# Patient Record
Sex: MALE | Race: BLACK OR AFRICAN AMERICAN | NOT HISPANIC OR LATINO | Employment: FULL TIME | URBAN - METROPOLITAN AREA
[De-identification: names, ages, dates, MRNs, and addresses within clinical notes are randomized per-mention and may not be internally consistent; named-entity substitution may affect disease eponyms.]

---

## 2017-04-24 ENCOUNTER — GENERIC CONVERSION - ENCOUNTER (OUTPATIENT)
Dept: OTHER | Facility: OTHER | Age: 32
End: 2017-04-24

## 2017-06-02 DIAGNOSIS — I63.30 CEREBRAL INFARCTION DUE TO THROMBOSIS OF CEREBRAL ARTERY (HCC): ICD-10-CM

## 2017-09-05 DIAGNOSIS — I63.30 CEREBRAL INFARCTION DUE TO THROMBOSIS OF CEREBRAL ARTERY (HCC): ICD-10-CM

## 2017-12-07 ENCOUNTER — GENERIC CONVERSION - ENCOUNTER (OUTPATIENT)
Dept: OTHER | Facility: OTHER | Age: 32
End: 2017-12-07

## 2018-01-10 NOTE — MISCELLANEOUS
Message  Return to work or school:   Geni Hernandez is under my professional care  He was seen in my office on 8/30/16    He is not able to return to work until after he is seen             Signatures   Electronically signed by : Ryanne Morales DO; Aug 29 2016  5:21PM EST                       (Author)

## 2018-01-11 NOTE — MISCELLANEOUS
Message  Message Free Text Note Form: Patient had done INR level on Friday night  It was 1 8  I increased Coumadin dose to 7 5 mg T/W/Th/Sa/Su and 5 mg M/F and he is to do repeat INR on Thursday of this week  Karissa Jones DO       Signatures   Electronically signed by : Kvng Westfall DO;  Apr 24 2017  7:58PM EST                       (Author)

## 2018-01-11 NOTE — RESULT NOTES
Verified Results  (1) PT WITH INR 09Apr2016 09:56AM Lucretia Jones     Test Name Result Flag Reference   INR 2 3 H    Reference Range                     0 9-1 1  Moderate-intensity Warfarin Therapy 2 0-3 0  Higher-intensity Warfarin Therapy   3 0-4 0   PT 22 9 sec H 9 0-11 5   For more information on this test, go to:  [email or web address]

## 2018-01-12 NOTE — RESULT NOTES
Verified Results  MAMMO DIAGNOSTIC BILATERAL W CAD 22Apr2016 12:52PM Gin Munoz   TW Order Number: [id number]     Test Name Result Flag Reference   MAMMO DIAGNOSTIC BILATERAL W CAD (Report)     Patient History:   Family history of unknown cancer in father at age 79  Reason for exam: clinical finding  Mammo Diagnostic Bilateral W CAD: April 22, 2016 - Check In #:    [de-identified]   Bilateral MLO and CC view(s) were taken  Technologist: ARELY Duran (R)(M)   No prior studies available for comparison  The breast tissue is almost entirely fat  These images were    obtained using digital technique and with the assistance of    Computer Aided Detection  The breast tissue is predominantly    male pattern  There are no dominant masses, foci of architectural   distortion or suspicious clusters of calcification to suggest    malignancy  The visualized skin and nipples appear normal  A    small amount of symmetric retroareolar tissue is present    consistent with mild asymmetric gynecomastia  Medications that could potentially have gynecomastia as a side    effect are at least partially listed at the following website:    [email or web address]  [email or web address] htm     ASSESSMENT: BiRad:2 - Benign     Recommendation:   Clinical management and follow-up diagnostic mammogram of both    breasts if clinically indicated  Analyzed by CAD     8-10% of cancers will be missed on mammography  Management of a    palpable abnormality must be based on clinical grounds  Patients   will be notified of their results via letter from our facility  Accredited by [employer] and FDA       Transcription Location: [facility] [address]: [id number]     Risk Value(s):   Myriad Table: 1 5%

## 2018-01-13 NOTE — RESULT NOTES
Verified Results  (1) PT WITH INR [id number] 10:36AM Bart Jones     Test Name Result Flag Reference   INR 2 6 H    Reference Range                     0 9-1 1  Moderate-intensity Warfarin Therapy 2 0-3 0  Higher-intensity Warfarin Therapy   3 0-4 0   PT 26 0 sec H 9 0-11 5   For more information on this test, go to:  [email or web address]

## 2018-01-14 NOTE — RESULT NOTES
Verified Results  (1) LIPID PANEL, FASTING 09Apr2016 09:59AM Lucretia Jones     Test Name Result Flag Reference   CHOLESTEROL, TOTAL 92 mg/dL L 125-200   HDL CHOLESTEROL 30 mg/dL L > OR = 40   TRIGLICERIDES 682 mg/dL  <150   LDL-CHOLESTEROL 40 mg/dL (calc)  <130   Desirable range <100 mg/dL for patients with CHD or  diabetes and <70 mg/dL for diabetic patients with  known heart disease  CHOL/HDLC RATIO 3 1 (calc)  < OR = 5 0   NON HDL CHOLESTEROL 62 mg/dL (calc)     Target for non-HDL cholesterol is 30 mg/dL higher than   LDL cholesterol target

## 2018-01-15 NOTE — RESULT NOTES
Verified Results  * XR SPINE LUMBAR MINIMUM 4 VIEWS NON INJURY [id number] 02:05PM Latasha Gallegos Order Number: [id number]     Test Name Result Flag Reference   XR SPINE LUMBAR MINIMUM 4 VIEWS (Report)     LUMBAR SPINE     INDICATION: low back pain, numbness/tingling/ pain in the legs x a few months     COMPARISON: Lumbar spine radiographs 1/10/2009  VIEWS: AP, lateral, bilateral oblique and coned down projections; 5 images     FINDINGS:     Alignment is unremarkable  There is no radiographic evidence of acute fracture or destructive osseous lesion  Mild L4-5 and L5-S1 degenerative changes stable since the prior study  Incidental noted is made of spina bifida occulta at L5  Visualized soft tissues appear unremarkable  IMPRESSION:     Mild stable L4-5 and L5-S1 degenerative changes         Workstation performed: [id number]     Signed by:   Tariq Moseley MD   11/21/16

## 2018-01-16 NOTE — RESULT NOTES
Verified Results  (1) PT WITH INR [id number] 01:24PM Raj Jones     Test Name Result Flag Reference   INR 3 1 H    Reference Range                     0 9-1 1  Moderate-intensity Warfarin Therapy 2 0-3 0  Higher-intensity Warfarin Therapy   3 0-4 0   PT 31 6 sec H 9 0-11 5   For more information on this test, go to:  [email or web address]

## 2018-01-18 ENCOUNTER — GENERIC CONVERSION - ENCOUNTER (OUTPATIENT)
Dept: OTHER | Facility: OTHER | Age: 33
End: 2018-01-18

## 2018-01-18 LAB — INR PPP: 1.9 (ref 0.86–1.16)

## 2018-01-23 ENCOUNTER — ANTICOAG VISIT (OUTPATIENT)
Dept: FAMILY MEDICINE CLINIC | Facility: HOSPITAL | Age: 33
End: 2018-01-23

## 2018-02-01 DIAGNOSIS — I10 ESSENTIAL HYPERTENSION: Primary | ICD-10-CM

## 2018-02-01 RX ORDER — AMLODIPINE BESYLATE 10 MG/1
TABLET ORAL
Qty: 90 TABLET | Refills: 3 | Status: SHIPPED | OUTPATIENT
Start: 2018-02-01 | End: 2019-02-12 | Stop reason: SDUPTHER

## 2018-02-05 LAB — INR PPP: 2.3 (ref 0.86–1.16)

## 2018-02-07 ENCOUNTER — TELEPHONE (OUTPATIENT)
Dept: FAMILY MEDICINE CLINIC | Facility: HOSPITAL | Age: 33
End: 2018-02-07

## 2018-02-07 ENCOUNTER — ANTICOAG VISIT (OUTPATIENT)
Dept: FAMILY MEDICINE CLINIC | Facility: HOSPITAL | Age: 33
End: 2018-02-07

## 2018-02-07 NOTE — TELEPHONE ENCOUNTER
Called Jeremie Thomas and gave her the INR instructions same does, recheck in 2 weeks she is aware DD

## 2018-02-19 ENCOUNTER — OFFICE VISIT (OUTPATIENT)
Dept: FAMILY MEDICINE CLINIC | Facility: HOSPITAL | Age: 33
End: 2018-02-19
Payer: COMMERCIAL

## 2018-02-19 ENCOUNTER — TELEPHONE (OUTPATIENT)
Dept: FAMILY MEDICINE CLINIC | Facility: HOSPITAL | Age: 33
End: 2018-02-19

## 2018-02-19 VITALS
HEIGHT: 69 IN | OXYGEN SATURATION: 98 % | DIASTOLIC BLOOD PRESSURE: 58 MMHG | HEART RATE: 96 BPM | SYSTOLIC BLOOD PRESSURE: 138 MMHG | BODY MASS INDEX: 46.65 KG/M2 | WEIGHT: 315 LBS

## 2018-02-19 DIAGNOSIS — R50.9 FEBRILE ILLNESS: Primary | ICD-10-CM

## 2018-02-19 PROCEDURE — 99213 OFFICE O/P EST LOW 20 MIN: CPT | Performed by: INTERNAL MEDICINE

## 2018-02-19 RX ORDER — ATORVASTATIN CALCIUM 40 MG/1
1 TABLET, FILM COATED ORAL DAILY
COMMUNITY
Start: 2016-04-07 | End: 2018-03-17 | Stop reason: SDUPTHER

## 2018-02-19 RX ORDER — FLUTICASONE PROPIONATE 50 MCG
1 SPRAY, SUSPENSION (ML) NASAL DAILY
Qty: 16 G | Refills: 0 | Status: SHIPPED | OUTPATIENT
Start: 2018-02-19 | End: 2021-10-13 | Stop reason: ALTCHOICE

## 2018-02-19 RX ORDER — LISINOPRIL AND HYDROCHLOROTHIAZIDE 20; 12.5 MG/1; MG/1
1 TABLET ORAL DAILY
COMMUNITY
Start: 2017-08-25 | End: 2018-12-06 | Stop reason: SDUPTHER

## 2018-02-19 RX ORDER — SPIRONOLACTONE 25 MG/1
TABLET ORAL
COMMUNITY
Start: 2018-01-24 | End: 2018-04-20 | Stop reason: SDUPTHER

## 2018-02-19 RX ORDER — SULFAMETHOXAZOLE AND TRIMETHOPRIM 800; 160 MG/1; MG/1
1 TABLET ORAL EVERY 12 HOURS SCHEDULED
Qty: 14 TABLET | Refills: 0 | Status: SHIPPED | OUTPATIENT
Start: 2018-02-19 | End: 2018-02-26

## 2018-02-19 RX ORDER — WARFARIN SODIUM 5 MG/1
TABLET ORAL
COMMUNITY
Start: 2018-02-19 | End: 2018-03-17 | Stop reason: SDUPTHER

## 2018-02-19 RX ORDER — WARFARIN SODIUM 2.5 MG/1
TABLET ORAL
COMMUNITY
Start: 2017-02-01 | End: 2018-12-20 | Stop reason: SDUPTHER

## 2018-02-19 NOTE — PROGRESS NOTES
Assessment/Plan:      Diagnoses and all orders for this visit:    Febrile illness    Other orders  -     atorvastatin (LIPITOR) 40 mg tablet; Take 1 tablet by mouth daily  -     lisinopril-hydrochlorothiazide (PRINZIDE,ZESTORETIC) 20-12 5 MG per tablet; Take 1 tablet by mouth daily  -     Prenat w/o L-HJ-Wfpqyuy-FA-DHA (PRENATE DHA) 28-0 6-0 4-300 MG CAPS;   -     spironolactone (ALDACTONE) 25 mg tablet;   -     warfarin (COUMADIN) 2 5 mg tablet; Take by mouth  -     warfarin (COUMADIN) 5 mg tablet;           Subjective:     Patient ID: Evan Herring is a 28 y o  male  Presents c/o fever, chills, aches, coughing for 24 hrs        Review of Systems   Constitutional: Positive for fatigue and fever  HENT: Negative for hearing loss  Eyes: Negative for visual disturbance  Respiratory: Positive for cough and shortness of breath  Negative for chest tightness and wheezing  Cardiovascular: Negative for chest pain, palpitations and leg swelling  Gastrointestinal: Negative for abdominal pain, diarrhea and nausea  Genitourinary: Negative for dysuria and hematuria  Musculoskeletal: Negative for arthralgias  Neurological: Negative for dizziness, numbness and headaches  Psychiatric/Behavioral: Negative for confusion and dysphoric mood  All other systems reviewed and are negative  Objective:     Physical Exam   Constitutional: He is oriented to person, place, and time  He appears well-developed and well-nourished  Eyes: Pupils are equal, round, and reactive to light  Neck: Normal range of motion  Neck supple  No thyromegaly present  Cardiovascular: Normal rate, regular rhythm, normal heart sounds and intact distal pulses  No murmur heard  Pulmonary/Chest: Effort normal and breath sounds normal  He has no wheezes  He has no rales  Abdominal: Soft  Bowel sounds are normal  There is no tenderness  Musculoskeletal: Normal range of motion  He exhibits no edema, tenderness or deformity  Lymphadenopathy:     He has no cervical adenopathy  Neurological: He is alert and oriented to person, place, and time  He has normal reflexes  Skin: Skin is warm and dry  Psychiatric: He has a normal mood and affect  Vitals reviewed

## 2018-02-19 NOTE — PATIENT INSTRUCTIONS
Sinusitis or upper respiratory "cold" symptoms usually respond to symptomatic treatment  These conditions can last from 7 to 14 days with the worst symptoms occurring about day 4, 5, and 6  Symptomatic treatment includes tylenol or ibuprofen-like medication , 2 tablets every 4 hours for 48 hours; flonase spray in each nostril twice a day for 7 days: claritin 10 mg daily for 7 days (antihistamine): OTC cough suppressant if needed; rest: lots of fluids  If you have been prescribed antibiotic, take as directed and finish entire course  If high fever >101 occurs, or if symptoms worsen significantly, contact the office

## 2018-02-20 ENCOUNTER — TELEPHONE (OUTPATIENT)
Dept: FAMILY MEDICINE CLINIC | Facility: HOSPITAL | Age: 33
End: 2018-02-20

## 2018-02-20 LAB — INR PPP: 2.3 (ref 0.86–1.16)

## 2018-02-21 ENCOUNTER — TELEPHONE (OUTPATIENT)
Dept: FAMILY MEDICINE CLINIC | Facility: HOSPITAL | Age: 33
End: 2018-02-21

## 2018-02-21 ENCOUNTER — ANTICOAG VISIT (OUTPATIENT)
Dept: FAMILY MEDICINE CLINIC | Facility: HOSPITAL | Age: 33
End: 2018-02-21

## 2018-02-21 NOTE — TELEPHONE ENCOUNTER
I can not find any other message on this from you, please advise when should pt recheck ?  thanks DD

## 2018-02-21 NOTE — TELEPHONE ENCOUNTER
FYI: Pt is getting harassed from [employer]ina[employer] if he doesn't get rechecked Q 2 weeks ( this is a contract with  and [employer] Linares  )pt   wanted to know if you can change this, because he is trying to follow your instructions and as you know they can change  [phone] option 5 customer service number   Thanks DD

## 2018-03-06 ENCOUNTER — TELEPHONE (OUTPATIENT)
Dept: FAMILY MEDICINE CLINIC | Facility: HOSPITAL | Age: 33
End: 2018-03-06

## 2018-03-06 ENCOUNTER — ANTICOAG VISIT (OUTPATIENT)
Dept: FAMILY MEDICINE CLINIC | Facility: HOSPITAL | Age: 33
End: 2018-03-06

## 2018-03-06 LAB — INR PPP: 3 (ref 0.86–1.16)

## 2018-03-12 LAB — INR PPP: 3.4 (ref 0.86–1.16)

## 2018-03-13 ENCOUNTER — ANTICOAG VISIT (OUTPATIENT)
Dept: FAMILY MEDICINE CLINIC | Facility: HOSPITAL | Age: 33
End: 2018-03-13

## 2018-03-13 ENCOUNTER — TELEPHONE (OUTPATIENT)
Dept: FAMILY MEDICINE CLINIC | Facility: HOSPITAL | Age: 33
End: 2018-03-13

## 2018-03-17 DIAGNOSIS — R60.1 GENERALIZED EDEMA: Primary | ICD-10-CM

## 2018-03-17 DIAGNOSIS — I63.9 CEREBROVASCULAR ACCIDENT (CVA), UNSPECIFIED MECHANISM (HCC): Primary | ICD-10-CM

## 2018-03-19 RX ORDER — WARFARIN SODIUM 5 MG/1
TABLET ORAL
Qty: 60 TABLET | Refills: 5 | Status: SHIPPED | OUTPATIENT
Start: 2018-03-19 | End: 2018-12-20 | Stop reason: SDUPTHER

## 2018-03-19 RX ORDER — ATORVASTATIN CALCIUM 40 MG/1
TABLET, FILM COATED ORAL
Qty: 30 TABLET | Refills: 5 | Status: SHIPPED | OUTPATIENT
Start: 2018-03-19 | End: 2018-10-17 | Stop reason: SDUPTHER

## 2018-03-19 RX ORDER — MOMETASONE FUROATE 50 UG/1
SPRAY, METERED NASAL
Qty: 1 ACT | Refills: 2 | Status: SHIPPED | OUTPATIENT
Start: 2018-03-19 | End: 2018-05-24 | Stop reason: SDUPTHER

## 2018-03-20 ENCOUNTER — TELEPHONE (OUTPATIENT)
Dept: FAMILY MEDICINE CLINIC | Facility: HOSPITAL | Age: 33
End: 2018-03-20

## 2018-03-20 ENCOUNTER — ANTICOAG VISIT (OUTPATIENT)
Dept: FAMILY MEDICINE CLINIC | Facility: HOSPITAL | Age: 33
End: 2018-03-20

## 2018-03-20 LAB — INR PPP: 2.2 (ref 0.86–1.16)

## 2018-03-26 LAB — INR PPP: 2.8 (ref 0.86–1.16)

## 2018-03-27 ENCOUNTER — ANTICOAG VISIT (OUTPATIENT)
Dept: FAMILY MEDICINE CLINIC | Facility: HOSPITAL | Age: 33
End: 2018-03-27

## 2018-03-27 ENCOUNTER — TELEPHONE (OUTPATIENT)
Dept: FAMILY MEDICINE CLINIC | Facility: HOSPITAL | Age: 33
End: 2018-03-27

## 2018-03-27 NOTE — PROGRESS NOTES
Left a detailed message for Salvatore Leyva with INR instructions same dose recheck in 1 month as per Brigitte Shown DD

## 2018-04-09 LAB — INR PPP: 2.5 (ref 0.86–1.16)

## 2018-04-10 ENCOUNTER — TELEPHONE (OUTPATIENT)
Dept: FAMILY MEDICINE CLINIC | Facility: HOSPITAL | Age: 33
End: 2018-04-10

## 2018-04-10 ENCOUNTER — ANTICOAG VISIT (OUTPATIENT)
Dept: FAMILY MEDICINE CLINIC | Facility: HOSPITAL | Age: 33
End: 2018-04-10

## 2018-04-10 NOTE — TELEPHONE ENCOUNTER
Stuart Sims called with PT/INR results  Last night it was 2 5    Wero Julianmoreno is taking 10mg m,f, 7 5  Rest

## 2018-04-16 LAB — INR PPP: 2.9 (ref 0.86–1.16)

## 2018-04-17 ENCOUNTER — TELEPHONE (OUTPATIENT)
Dept: FAMILY MEDICINE CLINIC | Facility: HOSPITAL | Age: 33
End: 2018-04-17

## 2018-04-17 NOTE — TELEPHONE ENCOUNTER
Lorrie Castorena stopped into office I gave her info DD      Pt current dose is 10 mg M /Fri,7 5 mg all other days please advise DD

## 2018-04-18 ENCOUNTER — ANTICOAG VISIT (OUTPATIENT)
Dept: FAMILY MEDICINE CLINIC | Facility: HOSPITAL | Age: 33
End: 2018-04-18

## 2018-04-20 DIAGNOSIS — I10 ESSENTIAL HYPERTENSION: Primary | ICD-10-CM

## 2018-04-20 RX ORDER — SPIRONOLACTONE 25 MG/1
TABLET ORAL
Qty: 90 TABLET | Refills: 3 | Status: SHIPPED | OUTPATIENT
Start: 2018-04-20 | End: 2019-05-14 | Stop reason: SDUPTHER

## 2018-04-24 LAB — INR PPP: 2.9 (ref 0.86–1.16)

## 2018-04-25 ENCOUNTER — ANTICOAG VISIT (OUTPATIENT)
Dept: FAMILY MEDICINE CLINIC | Facility: HOSPITAL | Age: 33
End: 2018-04-25

## 2018-04-25 ENCOUNTER — TELEPHONE (OUTPATIENT)
Dept: FAMILY MEDICINE CLINIC | Facility: HOSPITAL | Age: 33
End: 2018-04-25

## 2018-05-03 ENCOUNTER — TELEPHONE (OUTPATIENT)
Dept: FAMILY MEDICINE CLINIC | Facility: HOSPITAL | Age: 33
End: 2018-05-03

## 2018-05-03 DIAGNOSIS — G45.9 TRANSIENT CEREBRAL ISCHEMIA, UNSPECIFIED TYPE: Primary | ICD-10-CM

## 2018-05-09 ENCOUNTER — OFFICE VISIT (OUTPATIENT)
Dept: FAMILY MEDICINE CLINIC | Facility: HOSPITAL | Age: 33
End: 2018-05-09
Payer: COMMERCIAL

## 2018-05-09 VITALS
HEART RATE: 95 BPM | SYSTOLIC BLOOD PRESSURE: 104 MMHG | DIASTOLIC BLOOD PRESSURE: 68 MMHG | HEIGHT: 69 IN | OXYGEN SATURATION: 98 % | BODY MASS INDEX: 46.65 KG/M2 | WEIGHT: 315 LBS

## 2018-05-09 DIAGNOSIS — E66.01 CLASS 3 SEVERE OBESITY DUE TO EXCESS CALORIES WITHOUT SERIOUS COMORBIDITY WITH BODY MASS INDEX (BMI) OF 50.0 TO 59.9 IN ADULT (HCC): ICD-10-CM

## 2018-05-09 DIAGNOSIS — R79.89 LOW TESTOSTERONE: ICD-10-CM

## 2018-05-09 DIAGNOSIS — E78.00 PURE HYPERCHOLESTEROLEMIA: ICD-10-CM

## 2018-05-09 DIAGNOSIS — R73.09 ELEVATED GLUCOSE: ICD-10-CM

## 2018-05-09 DIAGNOSIS — I10 ESSENTIAL HYPERTENSION: ICD-10-CM

## 2018-05-09 DIAGNOSIS — I63.30 CEREBRAL THROMBOSIS WITH CEREBRAL INFARCTION (HCC): Primary | ICD-10-CM

## 2018-05-09 PROCEDURE — 99213 OFFICE O/P EST LOW 20 MIN: CPT | Performed by: INTERNAL MEDICINE

## 2018-05-09 PROCEDURE — 3078F DIAST BP <80 MM HG: CPT | Performed by: INTERNAL MEDICINE

## 2018-05-09 PROCEDURE — 3074F SYST BP LT 130 MM HG: CPT | Performed by: INTERNAL MEDICINE

## 2018-05-09 NOTE — ASSESSMENT & PLAN NOTE
On anticoagulation and is well controlled - has home monitor every 2 weeks which is stable 2 9 and 2 2 on last  Readings(Sinclair machine)      currrent dose is 10 mg m/f and rest 7 5 mg

## 2018-05-09 NOTE — PROGRESS NOTES
Assessment/Plan:         Problem List Items Addressed This Visit        Cardiovascular and Mediastinum    Hypertension     Well controlled bp- to have echo at [facility] today  Has been stable with no headaches or dizziness  Relevant Orders    Lipid Panel with Direct LDL reflex    Comprehensive metabolic panel    TSH, 3rd generation with T4 reflex    Cerebral thrombosis with cerebral infarction ([facility] [address] ) - Primary     On anticoagulation and is well controlled - has home monitor every 2 weeks which is stable 2 9 and 2 2 on last  Readings(Sinclair machine)  currrent dose is 10 mg m/f and rest 7 5 mg           Relevant Orders    Lipid Panel with Direct LDL reflex    CBC and differential    Comprehensive metabolic panel       Other    Low testosterone    Relevant Orders    Testosterone    Hyperlipidemia    Elevated glucose    Relevant Orders    HEMOGLOBIN A1C W/ EAG ESTIMATION            Subjective:      Patient ID: Carolin Martínez is a 28 y o  male    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  Await those results to sign final clearance  The following portions of the patient's history were reviewed and updated as appropriate: allergies, current medications and problem list      Review of Systems   Constitutional: Negative for activity change, chills, fatigue and fever  HENT: Negative for congestion and postnasal drip  Some seasonal allergies   Cardiovascular: Negative for chest pain, palpitations and leg swelling  All other systems reviewed and are negative          Objective:      Current Outpatient Prescriptions:     amLODIPine (NORVASC) 10 mg tablet, TAKE 1 TABLET EVERY DAY, Disp: 90 tablet, Rfl: 3    atorvastatin (LIPITOR) 40 mg tablet, TAKE ONE TABLET BY MOUTH ONE TIME DAILY, Disp: 30 tablet, Rfl: 5    fluticasone (FLONASE) 50 mcg/act nasal spray, 1 spray into each nostril daily, Disp: 16 g, Rfl: 0    lisinopril-hydrochlorothiazide (PRINZIDE,ZESTORETIC) 20-12 5 MG per tablet, Take 1 tablet by mouth daily, Disp: , Rfl:     mometasone (NASONEX) 50 mcg/act nasal spray, USE 1 SPRAY EACH NOSTRIL DAILY, Disp: 1 Act, Rfl: 2    spironolactone (ALDACTONE) 25 mg tablet, TAKE 1 TABLET DAILY  , Disp: 90 tablet, Rfl: 3    warfarin (COUMADIN) 2 5 mg tablet, Take by mouth, Disp: , Rfl:     warfarin (COUMADIN) 5 mg tablet, TAKE 2 TABLETS DAILY AND OR AS DIRECTED, Disp: 60 tablet, Rfl: 5         Physical Exam   Constitutional: He is oriented to person, place, and time  He appears well-developed  obese   HENT:   Head: Normocephalic  Right Ear: External ear normal    Left Ear: External ear normal    Mouth/Throat: Oropharynx is clear and moist    Eyes: Right eye exhibits no discharge  Left eye exhibits no discharge  Neck: No JVD present  Cardiovascular: Normal rate and regular rhythm  Exam reveals no friction rub  No murmur heard  Pulmonary/Chest: Effort normal and breath sounds normal  No respiratory distress  He has no wheezes  Abdominal: Soft  He exhibits no distension  There is no tenderness  Neurological: He is alert and oriented to person, place, and time  He displays normal reflexes  No cranial nerve deficit  Coordination normal    Skin: Skin is warm and dry  No rash noted  Nursing note and vitals reviewed

## 2018-05-09 NOTE — ASSESSMENT & PLAN NOTE
Well controlled bp- to have echo at [facility] today  Has been stable with no headaches or dizziness

## 2018-05-10 ENCOUNTER — TELEPHONE (OUTPATIENT)
Dept: FAMILY MEDICINE CLINIC | Facility: HOSPITAL | Age: 33
End: 2018-05-10

## 2018-05-10 NOTE — TELEPHONE ENCOUNTER
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

## 2018-05-11 NOTE — TELEPHONE ENCOUNTER
INR and echo results faxed  Dr Iraida Reed dictated letter which his mother picked up on Thursday  No CDL paperwork

## 2018-05-16 LAB
ALBUMIN SERPL-MCNC: 4.1 G/DL (ref 3.6–5.1)
ALBUMIN/GLOB SERPL: 1.3 (CALC) (ref 1–2.5)
ALP SERPL-CCNC: 67 U/L (ref 40–115)
ALT SERPL-CCNC: 41 U/L (ref 9–46)
AST SERPL-CCNC: 23 U/L (ref 10–40)
BASOPHILS # BLD AUTO: 24 CELLS/UL (ref 0–200)
BASOPHILS NFR BLD AUTO: 0.2 %
BILIRUB SERPL-MCNC: 0.4 MG/DL (ref 0.2–1.2)
BUN SERPL-MCNC: 17 MG/DL (ref 7–25)
BUN/CREAT SERPL: ABNORMAL (CALC) (ref 6–22)
CALCIUM SERPL-MCNC: 8.8 MG/DL (ref 8.6–10.3)
CHLORIDE SERPL-SCNC: 101 MMOL/L (ref 98–110)
CHOLEST SERPL-MCNC: 118 MG/DL
CHOLEST/HDLC SERPL: 3.6 (CALC)
CO2 SERPL-SCNC: 26 MMOL/L (ref 20–31)
CREAT SERPL-MCNC: 1.1 MG/DL (ref 0.6–1.35)
EOSINOPHIL # BLD AUTO: 207 CELLS/UL (ref 15–500)
EOSINOPHIL NFR BLD AUTO: 1.7 %
ERYTHROCYTE [DISTWIDTH] IN BLOOD BY AUTOMATED COUNT: 13.8 % (ref 11–15)
EST. AVERAGE GLUCOSE BLD GHB EST-MCNC: 117 (CALC)
EST. AVERAGE GLUCOSE BLD GHB EST-SCNC: 6.5 (CALC)
GLOBULIN SER CALC-MCNC: 3.1 G/DL (CALC) (ref 1.9–3.7)
GLUCOSE SERPL-MCNC: 112 MG/DL (ref 65–99)
HBA1C MFR BLD: 5.7 % OF TOTAL HGB
HCT VFR BLD AUTO: 43.7 % (ref 38.5–50)
HDLC SERPL-MCNC: 33 MG/DL
HGB BLD-MCNC: 14.8 G/DL (ref 13.2–17.1)
LDLC SERPL CALC-MCNC: 61 MG/DL (CALC)
LYMPHOCYTES # BLD AUTO: 1354 CELLS/UL (ref 850–3900)
LYMPHOCYTES NFR BLD AUTO: 11.1 %
MCH RBC QN AUTO: 28.8 PG (ref 27–33)
MCHC RBC AUTO-ENTMCNC: 33.9 G/DL (ref 32–36)
MCV RBC AUTO: 85.2 FL (ref 80–100)
MONOCYTES # BLD AUTO: 1269 CELLS/UL (ref 200–950)
MONOCYTES NFR BLD AUTO: 10.4 %
NEUTROPHILS # BLD AUTO: 9345 CELLS/UL (ref 1500–7800)
NEUTROPHILS NFR BLD AUTO: 76.6 %
NONHDLC SERPL-MCNC: 85 MG/DL (CALC)
PLATELET # BLD AUTO: 295 THOUSAND/UL (ref 140–400)
PMV BLD REES-ECKER: 9.5 FL (ref 7.5–12.5)
POTASSIUM SERPL-SCNC: 4 MMOL/L (ref 3.5–5.3)
PROT SERPL-MCNC: 7.2 G/DL (ref 6.1–8.1)
RBC # BLD AUTO: 5.13 MILLION/UL (ref 4.2–5.8)
SL AMB EGFR AFRICAN AMERICAN: 101 ML/MIN/1.73M2
SL AMB EGFR NON AFRICAN AMERICAN: 87 ML/MIN/1.73M2
SODIUM SERPL-SCNC: 136 MMOL/L (ref 135–146)
TESTOST FREE SERPL-MCNC: 42.5 PG/ML (ref 35–155)
TESTOST SERPL-MCNC: 244 NG/DL (ref 250–1100)
TRIGL SERPL-MCNC: 164 MG/DL
TSH SERPL-ACNC: 2.14 MIU/L (ref 0.4–4.5)
WBC # BLD AUTO: 12.2 THOUSAND/UL (ref 3.8–10.8)

## 2018-05-16 NOTE — PROGRESS NOTES
Call patient: Labs Controlled cholesterol, normal renal and liver function test   Blood sugar is slightly increased at 1:12 a  m

## 2018-05-17 ENCOUNTER — TELEPHONE (OUTPATIENT)
Dept: FAMILY MEDICINE CLINIC | Facility: HOSPITAL | Age: 33
End: 2018-05-17

## 2018-05-17 NOTE — TELEPHONE ENCOUNTER
Left a detailed message for pt on his VM DD      ----- Message from Lisa Zapien MD sent at 5/16/2018  4:25 PM EDT -----  Call patient: Labs Controlled cholesterol, normal renal and liver function test   Blood sugar is slightly increased at 1:12 a m

## 2018-05-24 ENCOUNTER — TELEPHONE (OUTPATIENT)
Dept: FAMILY MEDICINE CLINIC | Facility: HOSPITAL | Age: 33
End: 2018-05-24

## 2018-05-24 ENCOUNTER — OFFICE VISIT (OUTPATIENT)
Dept: FAMILY MEDICINE CLINIC | Facility: HOSPITAL | Age: 33
End: 2018-05-24
Payer: COMMERCIAL

## 2018-05-24 ENCOUNTER — ANTICOAG VISIT (OUTPATIENT)
Dept: FAMILY MEDICINE CLINIC | Facility: HOSPITAL | Age: 33
End: 2018-05-24

## 2018-05-24 VITALS
WEIGHT: 315 LBS | SYSTOLIC BLOOD PRESSURE: 110 MMHG | HEIGHT: 69 IN | TEMPERATURE: 99.9 F | DIASTOLIC BLOOD PRESSURE: 68 MMHG | BODY MASS INDEX: 46.65 KG/M2 | RESPIRATION RATE: 16 BRPM

## 2018-05-24 DIAGNOSIS — L30.9 DERMATITIS: Primary | ICD-10-CM

## 2018-05-24 DIAGNOSIS — J45.909 UNCOMPLICATED ASTHMA, UNSPECIFIED ASTHMA SEVERITY, UNSPECIFIED WHETHER PERSISTENT: ICD-10-CM

## 2018-05-24 DIAGNOSIS — J32.9 SINUSITIS, UNSPECIFIED CHRONICITY, UNSPECIFIED LOCATION: ICD-10-CM

## 2018-05-24 LAB — INR PPP: 2.7 (ref 0.86–1.17)

## 2018-05-24 PROCEDURE — 99214 OFFICE O/P EST MOD 30 MIN: CPT | Performed by: PHYSICIAN ASSISTANT

## 2018-05-24 RX ORDER — ALBUTEROL SULFATE 90 UG/1
2 AEROSOL, METERED RESPIRATORY (INHALATION) EVERY 6 HOURS PRN
Qty: 1 EACH | Refills: 2 | Status: SHIPPED | OUTPATIENT
Start: 2018-05-24 | End: 2020-08-19 | Stop reason: SDUPTHER

## 2018-05-24 RX ORDER — LEVOFLOXACIN 250 MG/1
250 TABLET ORAL DAILY
Qty: 7 TABLET | Refills: 0 | Status: SHIPPED | OUTPATIENT
Start: 2018-05-24 | End: 2018-05-31

## 2018-05-24 RX ORDER — GUAIFENESIN 600 MG
1200 TABLET, EXTENDED RELEASE 12 HR ORAL AS NEEDED
COMMUNITY

## 2018-05-24 NOTE — PATIENT INSTRUCTIONS
Patient to try Lidex for rash on hand/poison ivy sx  Also to continue nasal spray, start Levaquin daily for one week, and ProAir inhaler

## 2018-05-24 NOTE — PROGRESS NOTES
Assessment/Plan:         Diagnoses and all orders for this visit:    Dermatitis  -     fluocinonide (LIDEX) 0 05 % cream; Apply topically 2 (two) times a day  -     albuterol (PROAIR HFA) 90 mcg/act inhaler; Inhale 2 puffs every 6 (six) hours as needed for wheezing    Uncomplicated asthma, unspecified asthma severity, unspecified whether persistent  -     albuterol (PROAIR HFA) 90 mcg/act inhaler; Inhale 2 puffs every 6 (six) hours as needed for wheezing    Sinusitis, unspecified chronicity, unspecified location  -     levofloxacin (LEVAQUIN) 250 mg tablet; Take 1 tablet (250 mg total) by mouth daily for 7 days    Other orders  -     guaiFENesin (MUCINEX) 600 mg 12 hr tablet; Take 1,200 mg by mouth every 12 (twelve) hours        Subjective:      Patient ID: Latasha Boyer is a 28 y o  male  28year old AA male c/o right  ear pain since last weekend, and sore throat past few weeks  Also has cough productive of phlegm, and diarrhea  Has been using otc meds  , including cough syrup  Also has rash on left hand, believes obtained poison ivy exposure at work  Presents with mom  Review of Systems   Constitutional: Positive for chills, diaphoresis, fatigue and fever  HENT: Positive for congestion, ear pain, postnasal drip and sore throat  Negative for rhinorrhea  Eyes: Positive for discharge and itching  Negative for pain and visual disturbance  Respiratory: Positive for cough  Negative for shortness of breath  Gastrointestinal: Positive for diarrhea and vomiting  Negative for abdominal pain and nausea  Vomited up phlegm  Neurological: Negative for dizziness, light-headedness and headaches  Objective:      /68   Temp 99 9 °F (37 7 °C) (Tympanic)   Resp 16   Ht 5' 9" (1 753 m)   Wt (!) 152 kg (335 lb)   BMI 49 47 kg/m²          Physical Exam   Constitutional: He is oriented to person, place, and time  He appears well-developed and well-nourished  No distress  Overweight  HENT:   Head: Normocephalic and atraumatic  Right Ear: External ear normal    Left Ear: External ear normal    Mouth/Throat: Oropharynx is clear and moist  No oropharyngeal exudate  TM erythematous, turbinates inflamed  Eyes: Conjunctivae and EOM are normal  Right eye exhibits no discharge  Left eye exhibits no discharge  No scleral icterus  Pulmonary/Chest: Effort normal  No respiratory distress  He has wheezes  Neurological: He is alert and oriented to person, place, and time  Skin: He is not diaphoretic  Nursing note and vitals reviewed

## 2018-05-30 ENCOUNTER — TELEPHONE (OUTPATIENT)
Dept: FAMILY MEDICINE CLINIC | Facility: HOSPITAL | Age: 33
End: 2018-05-30

## 2018-05-30 NOTE — TELEPHONE ENCOUNTER
As per Yoel Dubois same dose and recheck in 4 weeks I left a detailed message on Rose's mobile number house number no longer in service DD

## 2018-06-05 ENCOUNTER — TELEPHONE (OUTPATIENT)
Dept: FAMILY MEDICINE CLINIC | Facility: HOSPITAL | Age: 33
End: 2018-06-05

## 2018-06-05 NOTE — TELEPHONE ENCOUNTER
Erlinda Goff is aware of pt INR instructions he is to stay on the same dose, 10 mg M/F, and 7 5 mg all other days   Recheck in 2 weeks DD

## 2018-06-12 ENCOUNTER — TELEPHONE (OUTPATIENT)
Dept: FAMILY MEDICINE CLINIC | Facility: HOSPITAL | Age: 33
End: 2018-06-12

## 2018-06-13 NOTE — TELEPHONE ENCOUNTER
Called [employer] - they are faxing over a form to be complete by Dr Celia Vazquez - needs dx's codes for his INR strips

## 2018-06-29 DIAGNOSIS — I63.30 CEREBRAL THROMBOSIS WITH CEREBRAL INFARCTION (HCC): Primary | ICD-10-CM

## 2018-07-06 LAB — INR PPP: 2.8 (ref 0.86–1.17)

## 2018-07-07 NOTE — PROGRESS NOTES
Telephone Call [address] Pickering [address]       1985            Insurance:  [address] [place] [address] on File:    Reason for Call:     Symptoms:      Phone call from michael mother [phone] patient's INR today was 2 8 he is currently on 10 mg Monday and Friday 7 5 the rest will have a repeat done in 2 weeks

## 2018-07-13 ENCOUNTER — ANTICOAG VISIT (OUTPATIENT)
Dept: FAMILY MEDICINE CLINIC | Facility: HOSPITAL | Age: 33
End: 2018-07-13

## 2018-07-17 ENCOUNTER — TELEPHONE (OUTPATIENT)
Dept: FAMILY MEDICINE CLINIC | Facility: HOSPITAL | Age: 33
End: 2018-07-17

## 2018-07-17 ENCOUNTER — ANTICOAG VISIT (OUTPATIENT)
Dept: FAMILY MEDICINE CLINIC | Facility: HOSPITAL | Age: 33
End: 2018-07-17

## 2018-07-17 LAB — INR PPP: 1.7 (ref 0.86–1.17)

## 2018-07-17 NOTE — PROGRESS NOTES
As per Michael Cisneros have pt take 10 mg M/W/F, and 7 5 mg on other days recheck inr in 2 weeks   Left a detailed message for pt DD

## 2018-07-17 NOTE — TELEPHONE ENCOUNTER
As per Juan Castillo: we need to see pt to assess the rash, Pt is to take 10 mg coumadin on M/W/F, and 7 5 mg rest, recheck in 2 weeks Left a detailed message,  on Pt VM  DD

## 2018-07-17 NOTE — TELEPHONE ENCOUNTER
Tucker Gaona called in Pt INR results 1 7 pt is taking 10 mg M/F, and 7 5 mg the rest of days, pt is eating a little bit better, and walking however he woke up this morning with a red rash on both of his legs above the ankle about 8 inches long   Pt would like to know what to do about this , Tucker Gaona has a picture of the rash she can bring in for Dr to look at, pt is at work, and can't get in  DD

## 2018-07-19 ENCOUNTER — OFFICE VISIT (OUTPATIENT)
Dept: FAMILY MEDICINE CLINIC | Facility: HOSPITAL | Age: 33
End: 2018-07-19
Payer: COMMERCIAL

## 2018-07-19 VITALS
HEIGHT: 69 IN | DIASTOLIC BLOOD PRESSURE: 68 MMHG | WEIGHT: 315 LBS | TEMPERATURE: 98.3 F | BODY MASS INDEX: 46.65 KG/M2 | OXYGEN SATURATION: 97 % | HEART RATE: 87 BPM | SYSTOLIC BLOOD PRESSURE: 104 MMHG

## 2018-07-19 DIAGNOSIS — R21 RASH AND NONSPECIFIC SKIN ERUPTION: Primary | ICD-10-CM

## 2018-07-19 PROBLEM — R50.9 FEBRILE ILLNESS: Status: RESOLVED | Noted: 2018-02-19 | Resolved: 2018-07-19

## 2018-07-19 PROCEDURE — 3008F BODY MASS INDEX DOCD: CPT | Performed by: INTERNAL MEDICINE

## 2018-07-19 PROCEDURE — 99213 OFFICE O/P EST LOW 20 MIN: CPT | Performed by: INTERNAL MEDICINE

## 2018-07-19 RX ORDER — BETAMETHASONE DIPROPIONATE 0.5 MG/G
CREAM TOPICAL 2 TIMES DAILY
Qty: 30 G | Refills: 0 | Status: SHIPPED | OUTPATIENT
Start: 2018-07-19 | End: 2019-11-25 | Stop reason: ALTCHOICE

## 2018-07-19 NOTE — PROGRESS NOTES
Assessment/Plan:    No problem-specific Assessment & Plan notes found for this encounter  Diagnoses and all orders for this visit:    Rash and nonspecific skin eruption  -     betamethasone dipropionate (DIPROSONE) 0 05 % cream; Apply topically 2 (two) times a day          Subjective:      Patient ID: Keiko Gutiérrez is a 28 y o  male  Rash   This is a new problem  Episode onset: 3 days ago  The problem has been rapidly improving since onset  The affected locations include the left lower leg and right lower leg  The rash is characterized by redness  He was exposed to nothing  Pertinent negatives include no congestion, diarrhea, fatigue, fever or shortness of breath  Past treatments include nothing (is improving with soap and water)  The treatment provided significant relief  The following portions of the patient's history were reviewed and updated as appropriate: current medications, past family history, past medical history, past social history, past surgical history and problem list     Review of Systems   Constitutional: Negative for fatigue and fever  HENT: Negative for congestion  Respiratory: Negative for shortness of breath  Cardiovascular: Negative for chest pain  Gastrointestinal: Negative for diarrhea  Skin: Positive for rash  Objective:    /68   Pulse 87   Temp 98 3 °F (36 8 °C) (Tympanic)   Ht 5' 9" (1 753 m)   Wt (!) 156 kg (343 lb)   SpO2 97%   BMI 50 65 kg/m²      Physical Exam   Constitutional: He appears well-developed  No distress  Cardiovascular: Normal rate and regular rhythm  Pulmonary/Chest: Effort normal and breath sounds normal    Skin: Rash noted     Faint, barely noticeable bandlike erythema without warmth, vesicles, draining on the left lower leg           Mode Eckert MD

## 2018-07-31 LAB — INR PPP: 2 (ref 0.86–1.17)

## 2018-08-01 ENCOUNTER — ANTICOAG VISIT (OUTPATIENT)
Dept: FAMILY MEDICINE CLINIC | Facility: HOSPITAL | Age: 33
End: 2018-08-01

## 2018-08-01 ENCOUNTER — TELEPHONE (OUTPATIENT)
Dept: FAMILY MEDICINE CLINIC | Facility: HOSPITAL | Age: 33
End: 2018-08-01

## 2018-08-09 ENCOUNTER — TELEPHONE (OUTPATIENT)
Dept: FAMILY MEDICINE CLINIC | Facility: HOSPITAL | Age: 33
End: 2018-08-09

## 2018-08-09 ENCOUNTER — ANTICOAG VISIT (OUTPATIENT)
Dept: FAMILY MEDICINE CLINIC | Facility: HOSPITAL | Age: 33
End: 2018-08-09

## 2018-08-09 LAB — INR PPP: 2.3 (ref 0.86–1.17)

## 2018-08-22 ENCOUNTER — TELEPHONE (OUTPATIENT)
Dept: FAMILY MEDICINE CLINIC | Facility: HOSPITAL | Age: 33
End: 2018-08-22

## 2018-08-22 LAB — INR PPP: 2.3 (ref 0.86–1.17)

## 2018-08-22 NOTE — TELEPHONE ENCOUNTER
Pt goal is 2 0-3 0 he is taking 10 mg M/W/F, 7 5 all other days his INR was 2 3 8/21/2018, please advise DD

## 2018-08-23 ENCOUNTER — ANTICOAG VISIT (OUTPATIENT)
Dept: FAMILY MEDICINE CLINIC | Facility: HOSPITAL | Age: 33
End: 2018-08-23

## 2018-09-04 LAB — INR PPP: 3.1 (ref 0.86–1.17)

## 2018-09-05 ENCOUNTER — ANTICOAG VISIT (OUTPATIENT)
Dept: FAMILY MEDICINE CLINIC | Facility: HOSPITAL | Age: 33
End: 2018-09-05

## 2018-09-05 ENCOUNTER — TELEPHONE (OUTPATIENT)
Dept: FAMILY MEDICINE CLINIC | Facility: HOSPITAL | Age: 33
End: 2018-09-05

## 2018-09-05 NOTE — PROGRESS NOTES
As per Dr Yenifer Marin hold dose today WED, then resume same dose INR in 1 week Jun Yoo is aware DD

## 2018-09-11 LAB — INR PPP: 2.5 (ref 0.86–1.17)

## 2018-09-12 ENCOUNTER — ANTICOAG VISIT (OUTPATIENT)
Dept: FAMILY MEDICINE CLINIC | Facility: HOSPITAL | Age: 33
End: 2018-09-12

## 2018-09-18 LAB — INR PPP: 2.8 (ref 0.86–1.17)

## 2018-09-19 ENCOUNTER — ANTICOAG VISIT (OUTPATIENT)
Dept: FAMILY MEDICINE CLINIC | Facility: HOSPITAL | Age: 33
End: 2018-09-19

## 2018-09-19 ENCOUNTER — TELEPHONE (OUTPATIENT)
Dept: FAMILY MEDICINE CLINIC | Facility: HOSPITAL | Age: 33
End: 2018-09-19

## 2018-09-19 NOTE — TELEPHONE ENCOUNTER
Pt Goal is 2 3-3 0  Todays INR  2 8      (last INR was 9/11/2018 2 50)    Pt is taking 10 mg M/W/F  7 5 mg the rest      Please advise DD

## 2018-10-04 ENCOUNTER — ANTICOAG VISIT (OUTPATIENT)
Dept: FAMILY MEDICINE CLINIC | Facility: HOSPITAL | Age: 33
End: 2018-10-04

## 2018-10-04 LAB — INR PPP: 2.9 (ref 0.86–1.17)

## 2018-10-11 ENCOUNTER — TELEPHONE (OUTPATIENT)
Dept: FAMILY MEDICINE CLINIC | Facility: HOSPITAL | Age: 33
End: 2018-10-11

## 2018-10-11 ENCOUNTER — ANTICOAG VISIT (OUTPATIENT)
Dept: FAMILY MEDICINE CLINIC | Facility: HOSPITAL | Age: 33
End: 2018-10-11

## 2018-10-11 LAB — INR PPP: 3.2 (ref 0.86–1.17)

## 2018-10-17 DIAGNOSIS — I63.9 CEREBROVASCULAR ACCIDENT (CVA), UNSPECIFIED MECHANISM (HCC): ICD-10-CM

## 2018-10-18 RX ORDER — ATORVASTATIN CALCIUM 40 MG/1
TABLET, FILM COATED ORAL
Qty: 30 TABLET | Refills: 11 | Status: SHIPPED | OUTPATIENT
Start: 2018-10-18 | End: 2019-02-12 | Stop reason: SDUPTHER

## 2018-10-23 ENCOUNTER — ANTICOAG VISIT (OUTPATIENT)
Dept: FAMILY MEDICINE CLINIC | Facility: HOSPITAL | Age: 33
End: 2018-10-23

## 2018-10-23 ENCOUNTER — TELEPHONE (OUTPATIENT)
Dept: FAMILY MEDICINE CLINIC | Facility: HOSPITAL | Age: 33
End: 2018-10-23

## 2018-10-23 LAB — INR PPP: 1.9 (ref 0.86–1.17)

## 2018-10-23 NOTE — PROGRESS NOTES
Pt is having a tooth extraction on Thursday, Pt held his 10 mg dose INR was 2 1 on 10/22/2018, Pt took INr again today it was 1 9, as per Dr Sarah Albright have pt hold dose today and tomorrow resume current dose after the procedure INr in 1 week Pt aware DD

## 2018-10-23 NOTE — TELEPHONE ENCOUNTER
pts mother Edwina Garces called stating pt took his pt/inr last night Monday 10/22 and it was 2 1 pt did not take his 10mg dose of coumadin  Pt normally takes 7 5 rest of the week  Renu Sylvester needs orders and when dosing  Side note Mother was telling me that Xiomara Love has an absessed tooth looks like a pus filled ucler  Pt is trying to get into a oral surg that will take his insurance  The one he norm goes to is on vacation  Pt was in a lot of pain last night  I wanted to pass this information along to the dr to make her aware of this  Renu Sylvester is thinking he will need to have his gum cut or possibly have the tooth removed    Please call [facility] with orders and dosing  [phone]

## 2018-10-24 ENCOUNTER — TELEPHONE (OUTPATIENT)
Dept: FAMILY MEDICINE CLINIC | Facility: HOSPITAL | Age: 33
End: 2018-10-24

## 2018-10-24 NOTE — TELEPHONE ENCOUNTER
See printed message  Pt having tooth extraction, due to abscess  Pt is on coumadin  Dr Mika Abdalla out of town

## 2018-10-24 NOTE — TELEPHONE ENCOUNTER
Mother called trying to get an abx for pt she states he has an abscess tooth and he is going in for a tooth extraction tomorrow, Dr Porter Manual office will not rx the abx because they have not seen the pt yet, they suggested our office prescribe this, I explained to pt mother that we have not seen pt for this either and are not able to just write a rx for an abx with a proper dx  Pt mother is VERY upset and is accusing our office of not caring about the patient all we care about is the money! I told her we could try to fit pt in for an OV  She declined   DD

## 2018-10-25 ENCOUNTER — ANTICOAG VISIT (OUTPATIENT)
Dept: FAMILY MEDICINE CLINIC | Facility: HOSPITAL | Age: 33
End: 2018-10-25

## 2018-10-25 ENCOUNTER — TELEPHONE (OUTPATIENT)
Dept: FAMILY MEDICINE CLINIC | Facility: HOSPITAL | Age: 33
End: 2018-10-25

## 2018-10-25 LAB — INR PPP: 1.2 (ref 0.86–1.17)

## 2018-10-25 NOTE — TELEPHONE ENCOUNTER
PT wanted Dr Adri Pan to be aware that he will be going in again for another tooth extraction on Mon Nov 8th she is looking for coumadin directions From Dr VERMA        Left message for Gaetano Milton Pt is to resume his current dose after the procedure then INR in 1 week Gaetano Milton is already aware, I left another message just to remind her DD

## 2018-10-30 LAB — INR PPP: 1.4 (ref 0.86–1.17)

## 2018-10-31 ENCOUNTER — TELEPHONE (OUTPATIENT)
Dept: FAMILY MEDICINE CLINIC | Facility: HOSPITAL | Age: 33
End: 2018-10-31

## 2018-10-31 ENCOUNTER — ANTICOAG VISIT (OUTPATIENT)
Dept: FAMILY MEDICINE CLINIC | Facility: HOSPITAL | Age: 33
End: 2018-10-31

## 2018-10-31 NOTE — TELEPHONE ENCOUNTER
I spoke to Vinicius Cohen she is aware as per Dr Lawrence Mcknight 10 mg M/WED 7 5 mg rest INR on MONday   DD

## 2018-11-01 ENCOUNTER — TELEPHONE (OUTPATIENT)
Dept: FAMILY MEDICINE CLINIC | Facility: HOSPITAL | Age: 33
End: 2018-11-01

## 2018-11-05 LAB — INR PPP: 1.5 (ref 0.86–1.17)

## 2018-11-06 ENCOUNTER — ANTICOAG VISIT (OUTPATIENT)
Dept: FAMILY MEDICINE CLINIC | Facility: HOSPITAL | Age: 33
End: 2018-11-06

## 2018-11-06 ENCOUNTER — TELEPHONE (OUTPATIENT)
Dept: FAMILY MEDICINE CLINIC | Facility: HOSPITAL | Age: 33
End: 2018-11-06

## 2018-11-15 ENCOUNTER — OFFICE VISIT (OUTPATIENT)
Dept: FAMILY MEDICINE CLINIC | Facility: HOSPITAL | Age: 33
End: 2018-11-15
Payer: COMMERCIAL

## 2018-11-15 ENCOUNTER — ANTICOAG VISIT (OUTPATIENT)
Dept: FAMILY MEDICINE CLINIC | Facility: HOSPITAL | Age: 33
End: 2018-11-15

## 2018-11-15 VITALS
HEART RATE: 88 BPM | OXYGEN SATURATION: 99 % | WEIGHT: 315 LBS | HEIGHT: 69 IN | BODY MASS INDEX: 46.65 KG/M2 | SYSTOLIC BLOOD PRESSURE: 110 MMHG | DIASTOLIC BLOOD PRESSURE: 64 MMHG | TEMPERATURE: 96.5 F

## 2018-11-15 DIAGNOSIS — E78.49 OTHER HYPERLIPIDEMIA: ICD-10-CM

## 2018-11-15 DIAGNOSIS — R73.09 ELEVATED GLUCOSE: ICD-10-CM

## 2018-11-15 DIAGNOSIS — I10 ESSENTIAL HYPERTENSION: Primary | ICD-10-CM

## 2018-11-15 DIAGNOSIS — E66.01 MORBID OBESITY WITH BMI OF 50.0-59.9, ADULT (HCC): ICD-10-CM

## 2018-11-15 DIAGNOSIS — I63.30 CEREBRAL THROMBOSIS WITH CEREBRAL INFARCTION (HCC): ICD-10-CM

## 2018-11-15 LAB — INR PPP: 1.1 (ref 0.86–1.17)

## 2018-11-15 PROCEDURE — 3008F BODY MASS INDEX DOCD: CPT | Performed by: INTERNAL MEDICINE

## 2018-11-15 PROCEDURE — 1036F TOBACCO NON-USER: CPT | Performed by: INTERNAL MEDICINE

## 2018-11-15 PROCEDURE — 3074F SYST BP LT 130 MM HG: CPT | Performed by: INTERNAL MEDICINE

## 2018-11-15 PROCEDURE — 99214 OFFICE O/P EST MOD 30 MIN: CPT | Performed by: INTERNAL MEDICINE

## 2018-11-15 PROCEDURE — 3078F DIAST BP <80 MM HG: CPT | Performed by: INTERNAL MEDICINE

## 2018-11-15 NOTE — PATIENT INSTRUCTIONS
10 mg daily for 3 days for coumadin then go back 10 mg mw then 7 5 rest- repeat weekly pt   Do fasting labs in 1 week   my chart  Info  Restart flonase 2 squirts each nostril twice daily

## 2018-11-15 NOTE — PROGRESS NOTES
Assessment/Plan:             Problem List Items Addressed This Visit        Cardiovascular and Mediastinum    Essential hypertension - Primary     Well controlled on current meds- on norvasc and lisinopril/ hct/ aldactone         Cerebral thrombosis with cerebral infarction ([facility] [address] )     No new cns issues  Other    Other hyperlipidemia     To check lipid         Elevated glucose     Had last labs in may- will repeat hba1c this month         Morbid obesity with BMI of 50 0-59 9, adult (HCC)            Subjective:      Patient ID: Arya Benedict is a 28 y o  male    1  Tooth extraction- done today with Dr Tsering Rasheed- has been off the coumadin since Monday- will restart at 10mg daily for 4 days then resume prior 10 mg 4x weeka nd 7 5 3 x weekl  2 elevated glucose- on labs done in may- will repeat this month  3 to restart the flonase regualrlly for next 3-4 weeks        The following portions of the patient's history were reviewed and updated as appropriate: allergies, current medications and problem list      Review of Systems   HENT: Negative for congestion and dental problem  Respiratory: Negative for shortness of breath  Cardiovascular: Negative for palpitations and leg swelling  Gastrointestinal: Negative for abdominal distention and abdominal pain  Neurological: Negative for dizziness, seizures, light-headedness and headaches  All other systems reviewed and are negative          Objective:      Current Outpatient Prescriptions:     albuterol (PROAIR HFA) 90 mcg/act inhaler, Inhale 2 puffs every 6 (six) hours as needed for wheezing, Disp: 1 each, Rfl: 2    amLODIPine (NORVASC) 10 mg tablet, TAKE 1 TABLET EVERY DAY, Disp: 90 tablet, Rfl: 3    atorvastatin (LIPITOR) 40 mg tablet, TAKE 1 TABLET EVERY DAY, Disp: 30 tablet, Rfl: 11    betamethasone dipropionate (DIPROSONE) 0 05 % cream, Apply topically 2 (two) times a day, Disp: 30 g, Rfl: 0    fluocinonide (LIDEX) 0 05 % cream, Apply topically 2 (two) times a day, Disp: 30 g, Rfl: 0    fluticasone (FLONASE) 50 mcg/act nasal spray, 1 spray into each nostril daily, Disp: 16 g, Rfl: 0    guaiFENesin (MUCINEX) 600 mg 12 hr tablet, Take 1,200 mg by mouth every 12 (twelve) hours, Disp: , Rfl:     lisinopril-hydrochlorothiazide (PRINZIDE,ZESTORETIC) 20-12 5 MG per tablet, Take 1 tablet by mouth daily, Disp: , Rfl:     spironolactone (ALDACTONE) 25 mg tablet, TAKE 1 TABLET DAILY  , Disp: 90 tablet, Rfl: 3    warfarin (COUMADIN) 2 5 mg tablet, Take by mouth, Disp: , Rfl:     warfarin (COUMADIN) 5 mg tablet, TAKE 2 TABLETS DAILY AND OR AS DIRECTED, Disp: 60 tablet, Rfl: 5    Blood pressure 110/64, pulse 88, temperature (!) 96 5 °F (35 8 °C), height 5' 9" (1 753 m), weight (!) 160 kg (352 lb), SpO2 99 %  Physical Exam   Constitutional: He is oriented to person, place, and time  He appears well-developed and well-nourished  No distress  HENT:   Head: Normocephalic  Right Ear: External ear normal    Left Ear: External ear normal    Mild dullness in left tm   Eyes: Pupils are equal, round, and reactive to light  Conjunctivae are normal  Right eye exhibits no discharge  Left eye exhibits no discharge  Neck: No JVD present  No tracheal deviation present  Cardiovascular: Normal rate and regular rhythm  Exam reveals no friction rub  No murmur heard  Pulmonary/Chest: Effort normal and breath sounds normal    Abdominal: Soft  He exhibits no distension  Soft obese   Musculoskeletal: He exhibits no edema or deformity  Neurological: He is alert and oriented to person, place, and time  Nursing note and vitals reviewed

## 2018-11-18 LAB
ALBUMIN SERPL-MCNC: 4.1 G/DL (ref 3.6–5.1)
ALBUMIN/GLOB SERPL: 1.4 (CALC) (ref 1–2.5)
ALP SERPL-CCNC: 74 U/L (ref 40–115)
ALT SERPL-CCNC: 29 U/L (ref 9–46)
AST SERPL-CCNC: 20 U/L (ref 10–40)
BASOPHILS # BLD AUTO: 34 CELLS/UL (ref 0–200)
BASOPHILS NFR BLD AUTO: 0.3 %
BILIRUB SERPL-MCNC: 0.5 MG/DL (ref 0.2–1.2)
BUN SERPL-MCNC: 19 MG/DL (ref 7–25)
BUN/CREAT SERPL: ABNORMAL (CALC) (ref 6–22)
CALCIUM SERPL-MCNC: 9.6 MG/DL (ref 8.6–10.3)
CHLORIDE SERPL-SCNC: 99 MMOL/L (ref 98–110)
CHOLEST SERPL-MCNC: 109 MG/DL
CHOLEST/HDLC SERPL: 3.4 (CALC)
CO2 SERPL-SCNC: 28 MMOL/L (ref 20–32)
CREAT SERPL-MCNC: 1.02 MG/DL (ref 0.6–1.35)
EOSINOPHIL # BLD AUTO: 134 CELLS/UL (ref 15–500)
EOSINOPHIL NFR BLD AUTO: 1.2 %
ERYTHROCYTE [DISTWIDTH] IN BLOOD BY AUTOMATED COUNT: 14.3 % (ref 11–15)
GLOBULIN SER CALC-MCNC: 3 G/DL (CALC) (ref 1.9–3.7)
GLUCOSE SERPL-MCNC: 101 MG/DL (ref 65–99)
HBA1C MFR BLD: 6 % OF TOTAL HGB
HCT VFR BLD AUTO: 43.4 % (ref 38.5–50)
HDLC SERPL-MCNC: 32 MG/DL
HGB BLD-MCNC: 14.6 G/DL (ref 13.2–17.1)
LDLC SERPL CALC-MCNC: 54 MG/DL (CALC)
LYMPHOCYTES # BLD AUTO: 1400 CELLS/UL (ref 850–3900)
LYMPHOCYTES NFR BLD AUTO: 12.5 %
MCH RBC QN AUTO: 28.3 PG (ref 27–33)
MCHC RBC AUTO-ENTMCNC: 33.6 G/DL (ref 32–36)
MCV RBC AUTO: 84.3 FL (ref 80–100)
MONOCYTES # BLD AUTO: 1086 CELLS/UL (ref 200–950)
MONOCYTES NFR BLD AUTO: 9.7 %
NEUTROPHILS # BLD AUTO: 8546 CELLS/UL (ref 1500–7800)
NEUTROPHILS NFR BLD AUTO: 76.3 %
NONHDLC SERPL-MCNC: 77 MG/DL (CALC)
PLATELET # BLD AUTO: 309 THOUSAND/UL (ref 140–400)
PMV BLD REES-ECKER: 9.4 FL (ref 7.5–12.5)
POTASSIUM SERPL-SCNC: 4.4 MMOL/L (ref 3.5–5.3)
PROT SERPL-MCNC: 7.1 G/DL (ref 6.1–8.1)
RBC # BLD AUTO: 5.15 MILLION/UL (ref 4.2–5.8)
SL AMB EGFR AFRICAN AMERICAN: 111 ML/MIN/1.73M2
SL AMB EGFR NON AFRICAN AMERICAN: 95 ML/MIN/1.73M2
SODIUM SERPL-SCNC: 136 MMOL/L (ref 135–146)
TRIGL SERPL-MCNC: 146 MG/DL
WBC # BLD AUTO: 11.2 THOUSAND/UL (ref 3.8–10.8)

## 2018-11-20 ENCOUNTER — TELEPHONE (OUTPATIENT)
Dept: FAMILY MEDICINE CLINIC | Facility: HOSPITAL | Age: 33
End: 2018-11-20

## 2018-11-20 DIAGNOSIS — I63.30 CEREBRAL THROMBOSIS WITH CEREBRAL INFARCTION (HCC): Primary | ICD-10-CM

## 2018-11-20 NOTE — TELEPHONE ENCOUNTER
Pts mother Alissa Lester called this morning  Pt received a voicemail last night not to use his alevere pt/inr strips due to them being faulty  Pt did not check his inr last night but he took his 10mg dose of coumadin last night  He did not throw out his strips  Mary Nation wants to know if a [facility] lab order for pt/inr can be drawn up for him to go this Saturday 11/24? pts mother wants to come in at 12noon to get this lab slip  Wyatt Schulte said he may possibly check his inr tonight with the strips anyway  Parmjit Cassette He was told by [facility] that it would take up to 10 days to new strips mailed   I advisd to Barby louis doesn't have office hours till 1pm today    [phone]

## 2018-11-21 ENCOUNTER — TELEPHONE (OUTPATIENT)
Dept: FAMILY MEDICINE CLINIC | Facility: HOSPITAL | Age: 33
End: 2018-11-21

## 2018-11-21 DIAGNOSIS — I10 ESSENTIAL HYPERTENSION: Primary | ICD-10-CM

## 2018-11-21 DIAGNOSIS — R73.09 ELEVATED GLUCOSE: ICD-10-CM

## 2018-11-21 NOTE — TELEPHONE ENCOUNTER
I left a message for Meryle Felling to call me back so I can go over the results with her I printed up lab orders for pt  DD    ----- Message from Devaughn Dempsey DO sent at 11/20/2018  4:59 PM EST -----  Labs show normal total cholesterol 109    Fasting blood sugar was minimally elevated at 101-this is improved from 6 months ago where it was 112-continue low carb diet and exercise     Hemoglobin A1c slightly elevated at 6 0- have fasting BMP and hemoglobin A1c done in 3 months

## 2018-11-23 LAB — INR PPP: 2.1 (ref 0.86–1.17)

## 2018-11-24 LAB — INR PPP: 2.1 (ref 0.86–1.17)

## 2018-11-26 ENCOUNTER — ANTICOAG VISIT (OUTPATIENT)
Dept: FAMILY MEDICINE CLINIC | Facility: HOSPITAL | Age: 33
End: 2018-11-26

## 2018-11-26 ENCOUNTER — DOCUMENTATION (OUTPATIENT)
Dept: FAMILY MEDICINE CLINIC | Facility: HOSPITAL | Age: 33
End: 2018-11-26

## 2018-11-26 ENCOUNTER — TELEPHONE (OUTPATIENT)
Dept: FAMILY MEDICINE CLINIC | Facility: HOSPITAL | Age: 33
End: 2018-11-26

## 2018-11-26 LAB
ALBUMIN/CREAT UR: 4 MCG/MG CREAT
CREAT UR-MCNC: 134 MG/DL (ref 20–320)
INR PPP: 2.1
MICROALBUMIN UR-MCNC: 0.6 MG/DL
PROTHROMBIN TIME: 21.3 SEC (ref 9–11.5)

## 2018-11-26 NOTE — TELEPHONE ENCOUNTER
Patient's mother called  Berta Rogers is taking 10mg on Monday and Wednesday and 7 5mg rest of the week  She wanted to make sure they were on the "same page" as you  Also wondered about the micoralbumin results

## 2018-11-29 LAB — INR PPP: 2.8 (ref 0.86–1.17)

## 2018-11-30 ENCOUNTER — ANTICOAG VISIT (OUTPATIENT)
Dept: FAMILY MEDICINE CLINIC | Facility: HOSPITAL | Age: 33
End: 2018-11-30

## 2018-11-30 ENCOUNTER — TELEPHONE (OUTPATIENT)
Dept: FAMILY MEDICINE CLINIC | Facility: HOSPITAL | Age: 33
End: 2018-11-30

## 2018-11-30 NOTE — TELEPHONE ENCOUNTER
Pt got his new test strips in so he decided to check his INR last night, Paris Zheng explained pt is to busy to check on Sat, I explained to Celena Schroeder he was not due until the 3rd , she ignored me as per [employer]  is not in office ) pt result is with in range he should continue the same dose and recheck on the 3rd as instructed previously  Paris Zheng was angry with these instructions and wanted to do things her way, I explained this was the Dr instructions, Paris Zheng continued to yell at me on the phone and then hung up on me!  I will record the INR as such DD

## 2018-12-05 ENCOUNTER — ANTICOAG VISIT (OUTPATIENT)
Dept: FAMILY MEDICINE CLINIC | Facility: HOSPITAL | Age: 33
End: 2018-12-05

## 2018-12-05 ENCOUNTER — TELEPHONE (OUTPATIENT)
Dept: FAMILY MEDICINE CLINIC | Facility: HOSPITAL | Age: 33
End: 2018-12-05

## 2018-12-05 LAB — INR PPP: 2.4 (ref 0.86–1.17)

## 2018-12-05 NOTE — PROGRESS NOTES
Carolann Gonzales is aware, Pt is to take 10 mg M/W, then 7 5 mg rest INR in 1 week if he is stable after recheck then INR will be Q 2 weeks DD

## 2018-12-06 DIAGNOSIS — I10 HYPERTENSION, UNSPECIFIED TYPE: Primary | ICD-10-CM

## 2018-12-06 RX ORDER — LISINOPRIL AND HYDROCHLOROTHIAZIDE 20; 12.5 MG/1; MG/1
1 TABLET ORAL DAILY
Qty: 90 TABLET | Refills: 3 | Status: SHIPPED | OUTPATIENT
Start: 2018-12-06 | End: 2019-11-14 | Stop reason: SDUPTHER

## 2018-12-11 LAB — INR PPP: 2.2 (ref 0.86–1.17)

## 2018-12-12 ENCOUNTER — TELEPHONE (OUTPATIENT)
Dept: FAMILY MEDICINE CLINIC | Facility: HOSPITAL | Age: 33
End: 2018-12-12

## 2018-12-12 ENCOUNTER — ANTICOAG VISIT (OUTPATIENT)
Dept: FAMILY MEDICINE CLINIC | Facility: HOSPITAL | Age: 33
End: 2018-12-12

## 2018-12-20 ENCOUNTER — ANTICOAG VISIT (OUTPATIENT)
Dept: FAMILY MEDICINE CLINIC | Facility: HOSPITAL | Age: 33
End: 2018-12-20

## 2018-12-20 DIAGNOSIS — I63.9 CEREBROVASCULAR ACCIDENT (CVA), UNSPECIFIED MECHANISM (HCC): ICD-10-CM

## 2018-12-20 LAB — INR PPP: 2.1 (ref 0.86–1.17)

## 2018-12-20 RX ORDER — WARFARIN SODIUM 2.5 MG/1
2.5 TABLET ORAL
Qty: 60 TABLET | Refills: 3 | Status: SHIPPED | OUTPATIENT
Start: 2018-12-20 | End: 2019-04-24 | Stop reason: DRUGHIGH

## 2018-12-20 RX ORDER — WARFARIN SODIUM 5 MG/1
10 TABLET ORAL DAILY
Qty: 60 TABLET | Refills: 3 | Status: SHIPPED | OUTPATIENT
Start: 2018-12-20 | End: 2019-05-15 | Stop reason: SDUPTHER

## 2018-12-25 LAB — INR PPP: 2 (ref 0.86–1.17)

## 2018-12-26 ENCOUNTER — ANTICOAG VISIT (OUTPATIENT)
Dept: FAMILY MEDICINE CLINIC | Facility: HOSPITAL | Age: 33
End: 2018-12-26

## 2018-12-28 ENCOUNTER — OFFICE VISIT (OUTPATIENT)
Dept: FAMILY MEDICINE CLINIC | Facility: HOSPITAL | Age: 33
End: 2018-12-28
Payer: COMMERCIAL

## 2018-12-28 ENCOUNTER — TELEPHONE (OUTPATIENT)
Dept: FAMILY MEDICINE CLINIC | Facility: HOSPITAL | Age: 33
End: 2018-12-28

## 2018-12-28 VITALS
DIASTOLIC BLOOD PRESSURE: 60 MMHG | SYSTOLIC BLOOD PRESSURE: 110 MMHG | HEIGHT: 69 IN | TEMPERATURE: 102 F | WEIGHT: 315 LBS | HEART RATE: 113 BPM | BODY MASS INDEX: 46.65 KG/M2

## 2018-12-28 DIAGNOSIS — R50.9 FEVER, UNSPECIFIED FEVER CAUSE: Primary | ICD-10-CM

## 2018-12-28 PROCEDURE — 87631 RESP VIRUS 3-5 TARGETS: CPT | Performed by: INTERNAL MEDICINE

## 2018-12-28 PROCEDURE — 99214 OFFICE O/P EST MOD 30 MIN: CPT | Performed by: INTERNAL MEDICINE

## 2018-12-28 PROCEDURE — 1036F TOBACCO NON-USER: CPT | Performed by: INTERNAL MEDICINE

## 2018-12-28 RX ORDER — DIPHENOXYLATE HYDROCHLORIDE AND ATROPINE SULFATE 2.5; .025 MG/1; MG/1
1 TABLET ORAL 4 TIMES DAILY PRN
Qty: 12 TABLET | Refills: 0 | Status: SHIPPED | OUTPATIENT
Start: 2018-12-28 | End: 2019-11-25 | Stop reason: ALTCHOICE

## 2018-12-28 RX ORDER — OSELTAMIVIR PHOSPHATE 75 MG/1
75 CAPSULE ORAL EVERY 12 HOURS SCHEDULED
Qty: 10 CAPSULE | Refills: 0 | Status: SHIPPED | OUTPATIENT
Start: 2018-12-28 | End: 2019-01-02

## 2018-12-28 NOTE — LETTER
December 28, 2018     Patient: Tamara Young   YOB: 1985   Date of Visit: 12/28/2018       To Whom it May Concern:    Kiana Street is under my professional care  He was seen in my office on 12/28/2018  He may return to work on 12/31/2018  If you have any questions or concerns, please don't hesitate to call           Sincerely,          Marielle Nino MD        CC: No Recipients

## 2018-12-28 NOTE — PROGRESS NOTES
Assessment/Plan:       Diagnoses and all orders for this visit:    Fever, unspecified fever cause  -     Influenza A/B and RSV by PCR; Future  -     oseltamivir (TAMIFLU) 75 mg capsule; Take 1 capsule (75 mg total) by mouth every 12 (twelve) hours for 5 days  -     diphenoxylate-atropine (LOMOTIL) 2 5-0 025 mg per tablet; Take 1 tablet by mouth 4 (four) times a day as needed for diarrhea          All of the above diagnoses have been assessed  Additional COMMENTS/PLAN:  I concern is that he would have influenza  I will give him some Lomotil for the diarrhea flu swab was done empiric treatment with      Subjective:      Patient ID: Olga Noble is a 28 y o  male  HPI     Started with fever and diarrhea yesterday  Not much diarrhea  Did not have flu shot, did have some sick contacts  Has had some cough  No N/V  No sore throat or energy  The following portions of the patient's history were revised and updated as appropriate: Problem list, allergies, med list, FH, SH, Past medical and surgical histories  Current Outpatient Prescriptions   Medication Sig Dispense Refill    albuterol (PROAIR HFA) 90 mcg/act inhaler Inhale 2 puffs every 6 (six) hours as needed for wheezing 1 each 2    amLODIPine (NORVASC) 10 mg tablet TAKE 1 TABLET EVERY DAY 90 tablet 3    atorvastatin (LIPITOR) 40 mg tablet TAKE 1 TABLET EVERY DAY 30 tablet 11    betamethasone dipropionate (DIPROSONE) 0 05 % cream Apply topically 2 (two) times a day 30 g 0    fluocinonide (LIDEX) 0 05 % cream Apply topically 2 (two) times a day 30 g 0    fluticasone (FLONASE) 50 mcg/act nasal spray 1 spray into each nostril daily 16 g 0    guaiFENesin (MUCINEX) 600 mg 12 hr tablet Take 1,200 mg by mouth every 12 (twelve) hours      lisinopril-hydrochlorothiazide (PRINZIDE,ZESTORETIC) 20-12 5 MG per tablet Take 1 tablet by mouth daily 90 tablet 3    spironolactone (ALDACTONE) 25 mg tablet TAKE 1 TABLET DAILY   90 tablet 3    warfarin (COUMADIN) 2 5 mg tablet Take 1 tablet (2 5 mg total) by mouth daily 60 tablet 3    warfarin (COUMADIN) 5 mg tablet Take 2 tablets (10 mg total) by mouth daily As directed 60 tablet 3    diphenoxylate-atropine (LOMOTIL) 2 5-0 025 mg per tablet Take 1 tablet by mouth 4 (four) times a day as needed for diarrhea 12 tablet 0    oseltamivir (TAMIFLU) 75 mg capsule Take 1 capsule (75 mg total) by mouth every 12 (twelve) hours for 5 days 10 capsule 0     No current facility-administered medications for this visit  Review of Systems   All other systems reviewed and are negative  Objective:    /60 (BP Location: Left arm, Patient Position: Sitting, Cuff Size: Large)   Pulse (!) 113   Temp (!) 102 °F (38 9 °C) (Tympanic)   Ht 5' 9" (1 753 m)   Wt (!) 154 kg (340 lb)   BMI 50 21 kg/m²      Physical Exam   Constitutional: He appears well-developed and well-nourished  HENT:   Head: Normocephalic and atraumatic  Nose and throat are slightly injected clear nasal drainage  Nasopharyngeal swab done  Cardiovascular: Normal rate and regular rhythm  Pulmonary/Chest: Effort normal and breath sounds normal    Left no evidence of consolidation   Abdominal: Soft  Musculoskeletal: He exhibits no edema  Vitals reviewed          Anticoag visit on 12/26/2018   Component Date Value Ref Range Status    INR 12/25/2018 2 00* 0 86 - 1 17 Final   Anticoag visit on 12/20/2018   Component Date Value Ref Range Status    INR 12/20/2018 2 10* 0 86 - 1 17 Final         Chayo Kennedy MD

## 2018-12-29 ENCOUNTER — TELEPHONE (OUTPATIENT)
Dept: OTHER | Facility: OTHER | Age: 33
End: 2018-12-29

## 2018-12-29 LAB
FLUAV AG SPEC QL: DETECTED
FLUBV AG SPEC QL: ABNORMAL
RSV B RNA SPEC QL NAA+PROBE: ABNORMAL

## 2018-12-29 NOTE — TELEPHONE ENCOUNTER
Kirit Hdz 1985  CONFIDENTIALTY NOTICE: This fax transmission is intended only for the addressee  It contains information that is legally privileged,  confidential or otherwise protected from use or disclosure  If you are not the intended recipient, you are strictly prohibited from reviewing,  disclosing, copying using or disseminating any of this information or taking any action in reliance on or regarding this information  If you have  received this fax in error, please notify us immediately by telephone so that we can arrange for its return to us  Page:   Call Id: [id number]  Health Call  Standard Call Report  Health Call  Patient Name: Kirit Hdz  Gender: Male  : 1985  Age: 28 Y 6 M 25 D  Return Phone  Number: [phone] (Current)  Address: [place] [address]  City/State/Zip: [facility] [address]  Practice Name: [facility]  Practice Charged:  Physician:  Ramesh Rivera Name:  Relationship To  Patient: Self  Return Phone Number: [phone] (Current)  Presenting Problem: "I was supposed to get a call back to  see if I had the Flu and no call back  I need to know if I should stop my  meds "  Service Type: Triage  Charged Service 1: Kim Sampson U  38  Name and  Number:  Nurse Assessment  Nurse: Gregorio Del Castillo Date/Time: 2018 6:08:32 PM  Type of assessment required:  ---General (Adult or Child)  Duration of Current S/S  ---Started 2018  Location/Radiation  ---Respiratory / GI  Temperature (F) and route:  ---101 5 / oral   Symptom Specific Meds (Dose/Time):  ---Currently on Tamiflu 75mg every 12 hours for 5 days  Started 2018  Patient  stated that he was supposed to receive a call from the office informing him on whether  or not he has the flu and if he should stop or continue Tamiflu  Ibuprofen (2) 200mg PO  every 6 hours  Robitussin every 4 hours    Other S/S  ---Severe nasal congestion, productive cough, fever, body aches that have decreased,  headache, "no energy whatsoever," sleeping more than usual, and diarrhea  Pain Scale on scale of 1-10, 10 being the worst:  ---"My stomach hurts from coughing " Denies chest pain  Symptom progression:  Mendel Alfonso 1985  CONFIDENTIALTY NOTICE: This fax transmission is intended only for the addressee  It contains information that is legally privileged,  confidential or otherwise protected from use or disclosure  If you are not the intended recipient, you are strictly prohibited from reviewing,  disclosing, copying using or disseminating any of this information or taking any action in reliance on or regarding this information  If you have  received this fax in error, please notify us immediately by telephone so that we can arrange for its return to us  Page: 2 of 4  Call Id: [id number]  Nurse Assessment  ---same  Intake and Output  ---No appetite but is drinking water, ice chips, and watered down lite Gatorade  Voided  last at 1630  / Diarrhea, smaller amounts today  Last Exam/Treatment:  ---12/28/2018 for these symptoms  Protocols  Protocol Title Nurse Date/Time  Influenza Follow-up Call Guillermina Kemp 12/29/2018 6:28:04 PM  Question Caller Affirmed  Disp  Time Disposition Final User  12/29/2018 6:32:30 [address] [place] [address] , RN, Pablo Baldwin  12/29/2018 6:33:09 PM RN Triaged Yes Cheli Dale, RN, [facility] Advice Given Per Protocol  HOME CARE: You should be able to treat this at home  REASSURANCE: * Influenza is commonly known as the 'flu'  * Influenza is a  respiratory illness that is easily spread from person-to-person  The most common symptoms are the sudden onset of fever, muscle aches,  cough, runny nose, sore throat, fatigue and headache  For healthy people, the symptoms of influenza are similar to those of the common  cold  However, with influenza, the onset is more abrupt and fever is higher  Most people with the flu feel very sick for the first three  days   * The treatment of influenza depends on your main symptoms  It is usually no different from that used for other viral respiratory  infections  Most people who get sick with influenza get better at home without special treatment  * There are things that you can do to  feel better and decrease your symptoms  * Here is some care advice that should help  INFLUENZA - GENERAL CARE ADVICE *  Cough: Use cough drops  * Feeling dehydrated: Drink extra liquids  If the air in your home is dry, use a humidifier  * Fever: For fever  over 101 F (38 3 C), take acetaminophen every 4-6 hours (Adults 650 mg) OR ibuprofen every 6-8 hours (Adults 400-600 mg)  * Muscle  aches, headache, and other pains: Often this come and goes with the fever  Take acetaminophen every 4-6 hours (Adults 650 mg) OR  ibuprofen every 6-8 hours (Adults 400-600 mg)  * Sore throat: Try throat lozenges, hard candy or warm chicken broth  INFLUENZA  - ISOLATION IS NEEDED UNTIL AFTER FEVER IS GONE: * If you have flu-like symptoms, please stay at home until at least 24  hours after you are free of fever  * Do NOT go to work or school  * Do NOT go to Taoism,  centers, shopping, or other public  places  * Do NOT shake hands  * Do NOT share eating utensils (e g , spoon, fork) * Avoid close contact with others (hugging, kissing)  FOR A RUNNY NOSE - BLOW YOUR NOSE: * Nasal mucus and discharge help wash viruses and bacteria out of the nose and sinuses  * Blowing your nose helps clean out your nose  Use a handkerchief or a paper tissue  * If the skin around your nostrils gets irritated,  apply a tiny amount of petroleum ointment to the nasal openings once or twice a day  FOR A STUFFY NOSE - USE NASAL WASHES:  * Introduction: Saline (salt water) nasal irrigation (nasal wash) is an effective and simple home remedy for treating stuffy nose and  sinus congestion  The nose can be irrigated by pouring, spraying, or squirting salt water into the nose and then letting it run back out    * How it Helps: The salt water rinses out excess mucus, washes out any irritants (dust, allergens) that might be present, and moistens  the nasal cavity  * Methods: There are several ways to perform nasal irrigation  You can use a saline nasal spray bottle (available overthe-  counter), a rubber ear syringe, a medical syringe without the needle, or a NETI POT  STEP-BY-STEP INSTRUCTIONS: * STEP  1: Lean over a sink  * STEP 2: Gently squirt or spray warm salt water into one of your nostrils  * STEP 3: Some of the water may run  into the back of your throat  Spit this out  If you swallow the salt water it will not hurt you  * STEP 4: Blow your nose to clean out the  water and mucus  * STEP 5: Repeat steps 1-4 for the other nostril  You can do this a couple times a day if it seems to help you  HOW  TO MAKE SALINE (SALT WATER) NASAL [address]: NASAL DECONGESTANTS FOR A VERY STUFFY NOSE: * If you have  Arnaldo Cargo 1985  CONFIDENTIALTY NOTICE: This fax transmission is intended only for the addressee  It contains information that is legally privileged,  confidential or otherwise protected from use or disclosure  If you are not the intended recipient, you are strictly prohibited from reviewing,  disclosing, copying using or disseminating any of this information or taking any action in reliance on or regarding this information  If you have  received this fax in error, please notify us immediately by telephone so that we can arrange for its return to us  Page: 3 of 4  Call Id: [id number]  Care Advice Given Per Protocol  a very stuffy nose, nasal decongestant medicines can shrink the swollen nasal mucosa and allow for easier breathing  If you have a very  runny nose, these medicines can reduce the amount of drainage  They may be taken as pills by mouth or as a nasal spray  * Most people  do NOT need to use these medicines   If your nose feels blocked, you should try using nasal washes first  * PSEUDOEPHEDRINE  (Sudafed) is available OTC in pill form  Typical adult dosage is two 30 mg tablets every 6 hours  * PHENYLEPHRINE (Sudafed PE)  is available OTC in pill form  Typical adult dosage is one 10 mg tablet every 4 hours  * OXYMETAZOLINE NASAL DROPS (Afrin)  are available OTC  Clean out the nose before using  Spray each nostril once, wait one minute for absorption, and then spray a second  time  * PHENYLEPHRINE NASAL DROPS (Ney-Synephrine) are available OTC  Clean out the nose before using  Spray each nostril  once, wait one minute for absorption, and then spray a second time  * Read the package instructions on all medicines that you take  CAUTION - NASAL DECONGESTANTS: * Do not take these medications if you have high blood pressure, heart disease, prostate  enlargement, or an overactive thyroid  * Do not take these medications if you are pregnant  * Do not take these medications if you have  used a MAO inhibitor such as isocarboxazid (Marplan), phenelzine (Nardil), rasagiline (Azilect), selegiline (Eldepryl, Emsam), or  tranylcypromine (Parnate) in the past 2 weeks  Life-threatening side effects can occur  * Do not use these medications for more than 3  days (Reason: rebound nasal congestion)  SORE THROAT - For relief of sore throat: * Sip warm chicken broth or apple juice  * Suck  on hard candy or a throat lozenge (OTC)  * Gargle with warm salt water four times a day  To make salt water, put 1/2 teaspoon of salt  in 8 oz (240 ml) of warm water  * Avoid cigarette smoke  COUGH DROPS FOR COUGH: * Cough drops can help a lot, especially  for mild coughs  They reduce coughing by soothing your irritated throat and removing that tickle sensation in the back of the throat  *  Cough drops also have the advantage of portability - you can carry them with you  * Cough drops are available over-the-counter (OTC)  HOME REMEDY - HARD CANDY: Hard candy works just as well as a medicine-flavored OTC cough drops   COUGH MEDICINES:  * OTC COUGH SYRUPS: The most common cough suppressant in OTC cough medications is dextromethorphan  Often the letters  'DM' appear in the name  * OTC COUGH DROPS: Cough drops can help a lot, especially for mild coughs  They reduce coughing  by soothing your irritated throat and removing that tickle sensation in the back of the throat  Cough drops also have the advantage of  portability - you can carry them with you  * HOME REMEDY - HARD CANDY: Hard candy works just as well as medicine-flavored  OTC cough drops  People who have diabetes should use sugar-free candy  * HOME REMEDY - HONEY: This old home remedy  has been shown to help decrease coughing at night  The adult dosage is 2 teaspoons (10 ml) at bedtime  Honey should not be given to  infants under one year of age  OTC COUGH SYRUP - DEXTROMETHORPHAN: * Cough syrups containing the cough suppressant  dextromethorphan (DM) may help decrease your cough  Cough syrups work best for coughs that keep you awake at night  They can  also sometimes help in the late stages of a respiratory infection when the cough is dry and hacking  They can be used along with cough  drops  * Examples: Benylin, Robitussin DM, Vicks 44 Cough Relief * Read the package instructions for dosage, contraindications,  and other important information  CAUTION - DEXTROMETHORPHAN: * Do not try to completely suppress coughs that produce  mucus and phlegm  Remember that coughing is helpful in bringing up mucus from the lungs and preventing pneumonia  * Research  Notes: Dextromethorphan in some research studies has been shown to reduce the frequency and severity of cough in adults (18 years or  older) without significant adverse effects  However, other studies suggest that dextromethorphan is no better than placebo at reducing a  cough  * Drug Abuse Potential: It should be noted that dextromethorphan has become a drug of abuse  This problem is seen most often  in adolescents   Overdose symptoms can range from giggling and euphoria to hallucinations and coma  * CONTRAINDICATED: Do  not take dextromethorphan if you are taking a monoamine oxidase (MAO) inhibitor now or in the past 2 weeks  Examples of MAO  inhibitors include isocarboxazid (Marplan), phenelzine (Nardil), selegiline (Eldepryl, Emsam, Zelapar), and tranylcypromine (Parnate)  Do not take dextromethorphan if you are taking venlafaxine (Effexor)  COUGHING SPELLS: * Drink warm fluids  Inhale warm mist   (Reason: both relax the airway and loosen up the phlegm) * Suck on cough drops or hard candy to coat the irritated throat  PAIN OR  FEVER MEDICINES: * For pain and fever relief, take acetaminophen or ibuprofen  * Treat fevers above 101° F (38 3° C)  * The goal  of fever therapy is to bring the fever down to a comfortable level  Remember that fever medicine usually lowers fever 2-3° F (1-1 5°  C)  IBUPROFEN (E G , MOTRIN, ADVIL): * Take 400 mg (two 200 mg pills) by mouth every 6 hours as needed  * Another choice is  to take 600 mg (three 200 mg pills) by mouth every 8 hours as needed  * The most you should take each day is 1,200 mg (six 200 mg  pills a day), unless your doctor has told you to take more  CAUTION - NSAIDS (E G , IBUPROFEN, NAPROXEN): * Do not take  nonsteroidal anti-inflammatory drugs (NSAIDs) if you have stomach problems, kidney disease, heart failure, or other contraindications  to using this type of medication  * Do not take NSAID medications for over 7 days without consulting your PCP  * Do not take NSAID  medications if you are pregnant  * You may take this medicine with or without food  Taking it with food or milk may lessen the chance  the drug will upset your stomach  * GASTROINTESTINAL RISK: There is an increased risk of stomach ulcers, GI bleeding, perforation  * CARDIOVASCULAR RISK: There may be an increased risk of heart attack and stroke   CALL BACK IF: * Difficulty breathing *  Fever lasts more than 3 days * Runny Nose lasts more than 14 days * Cough lasts more than 3 weeks * You become worse  CARE  ADVICE given per Influenza Follow-Up Call (Adult) guideline  HOME CARE: You should be able to treat this at home  INFLUENZA -  TREATMENT WITH ANTIVIRAL DRUGS: * There are two antiviral medications that are helpful in treating this infection: oseltamivir  (brand name Tamiflu) and zanamivir (brand name Relenza)  * Treatment is recommended for HIGH RISK patients (e g , age over 59  years, pregnant, HIV+, or chronic medical condition) with influenza or any patient with severe symptoms (per CDC)  * Treatment is  typically not recommended for mild to moderate influenza illness that occurs in most healthy patients (per CDC)  Most patients recover  [facility] 1985  CONFIDENTIALTY NOTICE: This fax transmission is intended only for the addressee  It contains information that is legally privileged,  confidential or otherwise protected from use or disclosure  If you are not the intended recipient, you are strictly prohibited from reviewing,  disclosing, copying using or disseminating any of this information or taking any action in reliance on or regarding this information  If you have  received this fax in error, please notify us immediately by telephone so that we can arrange for its return to us  Page: 4 of 4  Call Id: [id number]  Care Advice Given Per Protocol  without taking antiviral medications  * The benefits are limited  Antiviral medications may reduce the time you are sick by 1 to 1 5 days  It helps reduce the symptoms, but does not cure the disease  * For best results, antiviral medications should be started within 48 hours of  the onset of flu symptoms  CALL BACK IF: * You have more questions  CARE ADVICE given per Influenza Follow-Up Call (Adult)  guideline  Caller Understands: Yes  Caller Disagree/Comply: Comply  PreDisposition: Unsure  Comments  User: Rhonda Quarles RN Date/Time: 12/29/2018 6:26:16 PM  Patient still having the same symptoms today   Per patient, "someone from the office was going to call him to tell him to continue  Tamiflu or not  Patient did not receive a call back  I could see from chart that patient was tested for Influenza A and B  Patient is  positive for Influenza A  I did inform patient that Influenza A was detected and to continue with the Tamiflu  Patient then asked  if there was anything else he could do to feel better  Reviewed care advice  Patient verbalized understanding

## 2018-12-31 PROBLEM — J10.1 INFLUENZA A: Status: ACTIVE | Noted: 2018-12-31

## 2018-12-31 NOTE — TELEPHONE ENCOUNTER
I left a message on patient's answering machine to call if he was having further symptoms  Was listed as positive for influenza a and given Tamiflu by Dr Valentin Or when seen on 12/28-should complete the Tamiflu Rx as he was directed by the on call nurse    If he has developed worsening shortness of breath or other symptoms he may need a chest x-ray

## 2019-01-09 ENCOUNTER — ANTICOAG VISIT (OUTPATIENT)
Dept: FAMILY MEDICINE CLINIC | Facility: HOSPITAL | Age: 34
End: 2019-01-09

## 2019-01-09 ENCOUNTER — TELEPHONE (OUTPATIENT)
Dept: FAMILY MEDICINE CLINIC | Facility: HOSPITAL | Age: 34
End: 2019-01-09

## 2019-01-09 LAB — INR PPP: 2.7 (ref 0.86–1.17)

## 2019-01-23 ENCOUNTER — ANTICOAG VISIT (OUTPATIENT)
Dept: FAMILY MEDICINE CLINIC | Facility: HOSPITAL | Age: 34
End: 2019-01-23

## 2019-01-23 ENCOUNTER — TELEPHONE (OUTPATIENT)
Dept: FAMILY MEDICINE CLINIC | Facility: HOSPITAL | Age: 34
End: 2019-01-23

## 2019-01-23 LAB — INR PPP: 3.3 (ref 0.86–1.17)

## 2019-01-23 NOTE — TELEPHONE ENCOUNTER
I spoke with Dixie Jefferson To give her Pt new INR instructions decrease coumadin to 7 5 mg daily then INR in 1 week she is aware DD

## 2019-01-29 LAB — INR PPP: 2.6 (ref 0.86–1.17)

## 2019-01-30 ENCOUNTER — ANTICOAG VISIT (OUTPATIENT)
Dept: FAMILY MEDICINE CLINIC | Facility: HOSPITAL | Age: 34
End: 2019-01-30

## 2019-02-12 DIAGNOSIS — I10 ESSENTIAL HYPERTENSION: ICD-10-CM

## 2019-02-12 DIAGNOSIS — I63.9 CEREBROVASCULAR ACCIDENT (CVA), UNSPECIFIED MECHANISM (HCC): ICD-10-CM

## 2019-02-12 LAB — INR PPP: 1.8 (ref 0.86–1.17)

## 2019-02-12 RX ORDER — AMLODIPINE BESYLATE 10 MG/1
10 TABLET ORAL DAILY
Qty: 90 TABLET | Refills: 3 | Status: SHIPPED | OUTPATIENT
Start: 2019-02-12 | End: 2020-02-17

## 2019-02-12 RX ORDER — ATORVASTATIN CALCIUM 40 MG/1
40 TABLET, FILM COATED ORAL DAILY
Qty: 90 TABLET | Refills: 3 | Status: SHIPPED | OUTPATIENT
Start: 2019-02-12 | End: 2020-02-17

## 2019-02-13 ENCOUNTER — TELEPHONE (OUTPATIENT)
Dept: FAMILY MEDICINE CLINIC | Facility: HOSPITAL | Age: 34
End: 2019-02-13

## 2019-02-13 ENCOUNTER — ANTICOAG VISIT (OUTPATIENT)
Dept: FAMILY MEDICINE CLINIC | Facility: HOSPITAL | Age: 34
End: 2019-02-13

## 2019-02-13 NOTE — TELEPHONE ENCOUNTER
Mother called with results -  1 8  She thinks he will probably be 10mg once a week and 7 5 the rest of the days  If pt is to take 10mg once a week, she was wondering if he could take the 10mg tonight

## 2019-02-13 NOTE — PROGRESS NOTES
Left a detailed message for pt on [facility] M # [phone]   Take 10 mg Wed, then 7 5 mg all other days, INR in 2 weeks GB/DD

## 2019-02-13 NOTE — TELEPHONE ENCOUNTER
I left a detailed message for pt on [facility] take 10 mg wed, then 7 5 mg all other days INR in 2 weeks DD

## 2019-02-25 LAB — INR PPP: 1.6 (ref 0.86–1.17)

## 2019-02-26 ENCOUNTER — TELEPHONE (OUTPATIENT)
Dept: FAMILY MEDICINE CLINIC | Facility: HOSPITAL | Age: 34
End: 2019-02-26

## 2019-02-26 ENCOUNTER — ANTICOAG VISIT (OUTPATIENT)
Dept: FAMILY MEDICINE CLINIC | Facility: HOSPITAL | Age: 34
End: 2019-02-26

## 2019-03-04 LAB — INR PPP: 1.6 (ref 0.86–1.17)

## 2019-03-05 ENCOUNTER — TELEPHONE (OUTPATIENT)
Dept: FAMILY MEDICINE CLINIC | Facility: HOSPITAL | Age: 34
End: 2019-03-05

## 2019-03-05 ENCOUNTER — ANTICOAG VISIT (OUTPATIENT)
Dept: FAMILY MEDICINE CLINIC | Facility: HOSPITAL | Age: 34
End: 2019-03-05

## 2019-03-05 NOTE — PROGRESS NOTES
As per EF take 10 mg Tue/Thur/Sat , then 7 5 mg all other days, INR in 1week   Lashay Montemayor is aware

## 2019-03-13 LAB — INR PPP: 2.5 (ref 0.86–1.17)

## 2019-03-14 ENCOUNTER — ANTICOAG VISIT (OUTPATIENT)
Dept: FAMILY MEDICINE CLINIC | Facility: HOSPITAL | Age: 34
End: 2019-03-14

## 2019-03-27 LAB — INR PPP: 2.2 (ref 0.86–1.17)

## 2019-03-28 ENCOUNTER — TELEPHONE (OUTPATIENT)
Dept: FAMILY MEDICINE CLINIC | Facility: HOSPITAL | Age: 34
End: 2019-03-28

## 2019-03-28 ENCOUNTER — ANTICOAG VISIT (OUTPATIENT)
Dept: FAMILY MEDICINE CLINIC | Facility: HOSPITAL | Age: 34
End: 2019-03-28

## 2019-03-28 NOTE — TELEPHONE ENCOUNTER
Jody Gay called with pt INR results   2 2 taken on 3/27/2019     Pt Goal 2 0-3 0    Pt dose is : 10 mg Tue/Thur/Sat, then 7 5 mg all other days

## 2019-04-11 ENCOUNTER — ANTICOAG VISIT (OUTPATIENT)
Dept: FAMILY MEDICINE CLINIC | Facility: HOSPITAL | Age: 34
End: 2019-04-11

## 2019-04-11 ENCOUNTER — TELEPHONE (OUTPATIENT)
Dept: FAMILY MEDICINE CLINIC | Facility: HOSPITAL | Age: 34
End: 2019-04-11

## 2019-04-11 LAB — INR PPP: 2.4 (ref 0.86–1.17)

## 2019-04-24 ENCOUNTER — OFFICE VISIT (OUTPATIENT)
Dept: FAMILY MEDICINE CLINIC | Facility: HOSPITAL | Age: 34
End: 2019-04-24
Payer: COMMERCIAL

## 2019-04-24 ENCOUNTER — ANTICOAG VISIT (OUTPATIENT)
Dept: FAMILY MEDICINE CLINIC | Facility: HOSPITAL | Age: 34
End: 2019-04-24

## 2019-04-24 VITALS
SYSTOLIC BLOOD PRESSURE: 120 MMHG | BODY MASS INDEX: 46.65 KG/M2 | WEIGHT: 315 LBS | DIASTOLIC BLOOD PRESSURE: 72 MMHG | HEIGHT: 69 IN | OXYGEN SATURATION: 97 % | HEART RATE: 93 BPM

## 2019-04-24 DIAGNOSIS — Z79.01 CHRONIC ANTICOAGULATION: ICD-10-CM

## 2019-04-24 DIAGNOSIS — E66.01 MORBID OBESITY WITH BMI OF 50.0-59.9, ADULT (HCC): ICD-10-CM

## 2019-04-24 DIAGNOSIS — I63.30 CEREBRAL THROMBOSIS WITH CEREBRAL INFARCTION (HCC): Primary | ICD-10-CM

## 2019-04-24 DIAGNOSIS — Z83.3 FAMILY HISTORY OF DIABETES MELLITUS IN FATHER: ICD-10-CM

## 2019-04-24 DIAGNOSIS — E78.49 OTHER HYPERLIPIDEMIA: ICD-10-CM

## 2019-04-24 DIAGNOSIS — J30.89 NON-SEASONAL ALLERGIC RHINITIS DUE TO FUNGAL SPORES: ICD-10-CM

## 2019-04-24 PROBLEM — J10.1 INFLUENZA A: Status: RESOLVED | Noted: 2018-12-31 | Resolved: 2019-04-24

## 2019-04-24 LAB — INR PPP: 2.5 (ref 0.86–1.17)

## 2019-04-24 PROCEDURE — 1036F TOBACCO NON-USER: CPT | Performed by: INTERNAL MEDICINE

## 2019-04-24 PROCEDURE — 3008F BODY MASS INDEX DOCD: CPT | Performed by: INTERNAL MEDICINE

## 2019-04-24 PROCEDURE — 99214 OFFICE O/P EST MOD 30 MIN: CPT | Performed by: INTERNAL MEDICINE

## 2019-04-24 RX ORDER — FEXOFENADINE HCL 180 MG/1
180 TABLET ORAL AS NEEDED
COMMUNITY

## 2019-04-29 ENCOUNTER — TELEPHONE (OUTPATIENT)
Dept: FAMILY MEDICINE CLINIC | Facility: HOSPITAL | Age: 34
End: 2019-04-29

## 2019-05-07 LAB — INR PPP: 2.5 (ref 0.86–1.17)

## 2019-05-08 ENCOUNTER — ANTICOAG VISIT (OUTPATIENT)
Dept: FAMILY MEDICINE CLINIC | Facility: HOSPITAL | Age: 34
End: 2019-05-08

## 2019-05-08 ENCOUNTER — TELEPHONE (OUTPATIENT)
Dept: FAMILY MEDICINE CLINIC | Facility: HOSPITAL | Age: 34
End: 2019-05-08

## 2019-05-14 DIAGNOSIS — I10 ESSENTIAL HYPERTENSION: ICD-10-CM

## 2019-05-14 RX ORDER — SPIRONOLACTONE 25 MG/1
TABLET ORAL
Qty: 90 TABLET | Refills: 3 | Status: SHIPPED | OUTPATIENT
Start: 2019-05-14 | End: 2019-05-15 | Stop reason: SDUPTHER

## 2019-05-15 DIAGNOSIS — I63.9 CEREBROVASCULAR ACCIDENT (CVA), UNSPECIFIED MECHANISM (HCC): ICD-10-CM

## 2019-05-15 DIAGNOSIS — I10 ESSENTIAL HYPERTENSION: ICD-10-CM

## 2019-05-15 RX ORDER — SPIRONOLACTONE 25 MG/1
25 TABLET ORAL DAILY
Qty: 90 TABLET | Refills: 3 | Status: SHIPPED | OUTPATIENT
Start: 2019-05-15 | End: 2020-06-15

## 2019-05-15 RX ORDER — WARFARIN SODIUM 5 MG/1
10 TABLET ORAL DAILY
Qty: 180 TABLET | Refills: 3 | Status: SHIPPED | OUTPATIENT
Start: 2019-05-15 | End: 2020-06-18 | Stop reason: SDUPTHER

## 2019-05-15 RX ORDER — WARFARIN SODIUM 5 MG/1
10 TABLET ORAL DAILY
Qty: 60 TABLET | Refills: 3 | Status: SHIPPED | OUTPATIENT
Start: 2019-05-15 | End: 2019-05-15 | Stop reason: SDUPTHER

## 2019-05-21 LAB — INR PPP: 2.1 (ref 0.86–1.17)

## 2019-05-22 ENCOUNTER — ANTICOAG VISIT (OUTPATIENT)
Dept: FAMILY MEDICINE CLINIC | Facility: HOSPITAL | Age: 34
End: 2019-05-22

## 2019-05-22 ENCOUNTER — TELEPHONE (OUTPATIENT)
Dept: FAMILY MEDICINE CLINIC | Facility: HOSPITAL | Age: 34
End: 2019-05-22

## 2019-06-04 LAB — INR PPP: 2 (ref 0.86–1.17)

## 2019-06-05 ENCOUNTER — ANTICOAG VISIT (OUTPATIENT)
Dept: FAMILY MEDICINE CLINIC | Facility: HOSPITAL | Age: 34
End: 2019-06-05

## 2019-06-05 ENCOUNTER — TELEPHONE (OUTPATIENT)
Dept: FAMILY MEDICINE CLINIC | Facility: HOSPITAL | Age: 34
End: 2019-06-05

## 2019-06-14 ENCOUNTER — TELEPHONE (OUTPATIENT)
Dept: FAMILY MEDICINE CLINIC | Facility: HOSPITAL | Age: 34
End: 2019-06-14

## 2019-06-17 DIAGNOSIS — T78.40XS ALLERGIC STATE, SEQUELA: Primary | ICD-10-CM

## 2019-06-17 RX ORDER — MONTELUKAST SODIUM 10 MG/1
10 TABLET ORAL
Qty: 30 TABLET | Refills: 3 | Status: SHIPPED | OUTPATIENT
Start: 2019-06-17 | End: 2019-10-09 | Stop reason: SDUPTHER

## 2019-06-18 LAB — INR PPP: 1.9 (ref 0.86–1.17)

## 2019-06-19 ENCOUNTER — TELEPHONE (OUTPATIENT)
Dept: FAMILY MEDICINE CLINIC | Facility: HOSPITAL | Age: 34
End: 2019-06-19

## 2019-06-19 ENCOUNTER — ANTICOAG VISIT (OUTPATIENT)
Dept: FAMILY MEDICINE CLINIC | Facility: HOSPITAL | Age: 34
End: 2019-06-19

## 2019-06-26 ENCOUNTER — ANTICOAG VISIT (OUTPATIENT)
Dept: FAMILY MEDICINE CLINIC | Facility: HOSPITAL | Age: 34
End: 2019-06-26

## 2019-06-26 ENCOUNTER — TELEPHONE (OUTPATIENT)
Dept: FAMILY MEDICINE CLINIC | Facility: HOSPITAL | Age: 34
End: 2019-06-26

## 2019-06-26 LAB — INR PPP: 2.9 (ref 0.84–1.19)

## 2019-06-29 LAB — HBA1C MFR BLD HPLC: 6.1 %

## 2019-07-01 ENCOUNTER — TELEPHONE (OUTPATIENT)
Dept: OTHER | Facility: OTHER | Age: 34
End: 2019-07-01

## 2019-07-01 LAB — INR PPP: 2.8 (ref 0.84–1.19)

## 2019-07-02 ENCOUNTER — TELEPHONE (OUTPATIENT)
Dept: FAMILY MEDICINE CLINIC | Facility: HOSPITAL | Age: 34
End: 2019-07-02

## 2019-07-02 ENCOUNTER — ANTICOAG VISIT (OUTPATIENT)
Dept: FAMILY MEDICINE CLINIC | Facility: HOSPITAL | Age: 34
End: 2019-07-02

## 2019-07-02 NOTE — TELEPHONE ENCOUNTER
Ezekiel Pace 1985  CONFIDENTIALTY NOTICE: This fax transmission is intended only for the addressee  It contains information that is legally privileged,  confidential or otherwise protected from use or disclosure  If you are not the intended recipient, you are strictly prohibited from reviewing,  disclosing, copying using or disseminating any of this information or taking any action in reliance on or regarding this information  If you have  received this fax in error, please notify us immediately by telephone so that we can arrange for its return to us  Page:   Call Id: [id number]  Health Call  Standard Call Report  Health Call  Patient Name: Ezekiel Pace  Gender: Male  : 1985  Age: 27 Y 11 M 25 D  Return Phone  Number: [phone] (Current)  Address: [place] [address]  City/State/Zip: [facility] [address]  Practice Name: [facility]  Practice Charged:  Physician:  [address] [place] [address] Name:  Relationship To  Patient: Self  Return Phone Number: [phone] (Current)  Presenting Problem: "I have large bruising after blood  work in both arms and I am on blood  thinners "  Service Type: Triage  Charged Service 1: Kim Sampson U  38  Name and  Number:  Nurse Assessment  Nurse: Swati Fu Date/Time: 2019 7:41:25 PM  Type of assessment required:  ---General (Adult or Child)  Duration of Current S/S  ---Noticed on   Location/Radiation  ---Both arms  Temperature (F) and route:  ---Denies fever  Symptom Specific Meds (Dose/Time):  ---Currently on Coumadin  Due to take 7 5mg this evening  Other S/S  ---Patient went for blood work on Saturday  Stated that he was "stuck 4 times and that  3 of the areas are black and blue and seem to be getting bigger  " 1 area is went from  nickel sized to the size of a quarter and the other 2 are a slightly bigger than a nickel  Denies pain but is very concerned due to the fact that he is on Coumadin    Pain Scale on scale of 1-10, 10 being the worst:  ---Denies pain  Symptom progression:  ---worse  Willard Chávez 1985  CONFIDENTIALTY NOTICE: This fax transmission is intended only for the addressee  It contains information that is legally privileged,  confidential or otherwise protected from use or disclosure  If you are not the intended recipient, you are strictly prohibited from reviewing,  disclosing, copying using or disseminating any of this information or taking any action in reliance on or regarding this information  If you have  received this fax in error, please notify us immediately by telephone so that we can arrange for its return to us  Page: 2 of 2  Call Id: [id number]  Nurse Assessment  Intake and Output  ---WNL  Last Exam/Treatment:  ---04/24/2019 for F/U  Protocols  Protocol Title Nurse Date/Time  Bruises J Carlos Young RN, Vikram Wagoner 7/1/2019 7:50:06 PM  Question Caller Affirmed  Disp  Time Disposition Final User  7/1/2019 7:52:50 [address] [place] [address] WANDA Reyes, Vikram Wagoner  7/1/2019 8:02:59 PM RN Triaged Yes J Carlos Young RN, [facility] Advice Given Per Protocol  HOME CARE: You should be able to treat this at home  REASSURANCE: It sounds like bruises from an injury that we can treat at  home  APPLY LOCAL COLD: Immediately after an injury apply pressure using a cold pack or ice in a wet washcloth for 20 minutes  Repeat in 1 hour, then as needed (Reason: reduce the bleeding and pain)  LOCAL HEAT: After 48 hours apply a warm moist washcloth  or heating pad for 10 minutes three times a day to help absorb the blood  PAIN MEDICINE: * ACETAMINOPHEN DOSING (e g ,  Tylenol): 650 mg by mouth every 4 hours or 1000 mg by mouth every 6 hours  Maximum dose per day = 4000 mg  AVOID ASPIRIN:  * Do not take aspirin for treatment of pain, unless your doctor has specifically told you to take it for a medical condition  * Aspirin is a  mild blood thinner and can make you bruise easier   EXPECTED COURSE: Most bruises are fully developed by 24 hours, change colors  by 1 week (yellowish-blue) and disappear by 2 weeks  CALL BACK IF: * Bruise is not fading by 10 days * Bruise persists over 3 weeks  * You become worse  Caller Understands: Yes  Caller Disagree/Comply: Comply  PreDisposition: Unsure  Comments  User: Melody Maher RN Date/Time: 7/1/2019 8:02:13 PM  At [facility] Dr Farheen Trinh to discuss  Dr Farheen Trinh stated that patient has a machine at home that he can check his INR now with  If  INR is between 2 - 3, patient is to take his regularly scheduled dose of Coumadin (7 5mg)  If above 3, hold Coumadin for 1 day  and then resume  Called patient back and had him check his INR  INR is 2 8  Instructed patient that he can take 7 5mg  Patient  verbalized understanding  Pt  stated that he would call the office in the morning to see when he should check INR next and if he  should continue with his usual regular dosing

## 2019-07-02 NOTE — PROGRESS NOTES
I spoke with Neto Beyer she is aware take 7 5 mg M/W/F/Sat, then 10 mg all other days, INR in 1 weeks Gb/DD

## 2019-07-05 DIAGNOSIS — R73.09 ELEVATED HEMOGLOBIN A1C: Primary | ICD-10-CM

## 2019-07-09 ENCOUNTER — TELEPHONE (OUTPATIENT)
Dept: FAMILY MEDICINE CLINIC | Facility: HOSPITAL | Age: 34
End: 2019-07-09

## 2019-07-09 LAB — INR PPP: 2.3 (ref 0.84–1.19)

## 2019-07-10 ENCOUNTER — ANTICOAG VISIT (OUTPATIENT)
Dept: FAMILY MEDICINE CLINIC | Facility: HOSPITAL | Age: 34
End: 2019-07-10

## 2019-07-10 ENCOUNTER — TELEPHONE (OUTPATIENT)
Dept: FAMILY MEDICINE CLINIC | Facility: HOSPITAL | Age: 34
End: 2019-07-10

## 2019-07-10 NOTE — TELEPHONE ENCOUNTER
Erlinda Goff called back, she was concerned that she did not hear back from you about coumadin instructions yet and it is getting late in the day

## 2019-07-11 ENCOUNTER — TELEPHONE (OUTPATIENT)
Dept: FAMILY MEDICINE CLINIC | Facility: HOSPITAL | Age: 34
End: 2019-07-11

## 2019-07-11 NOTE — TELEPHONE ENCOUNTER
Dr Antonio Vaca, you reviewed pt recent labs and suggested a low carb diet  Amaury gave pt a diet that was printed up from on line , now pt is asking for daily consumption guide, he is looking for how many carbs you want him to be on  Please advise I did not mention a nutritionist, I just didn't think pt would follow that path, I will if you think he needs to go there   DD

## 2019-07-24 ENCOUNTER — TELEPHONE (OUTPATIENT)
Dept: FAMILY MEDICINE CLINIC | Facility: HOSPITAL | Age: 34
End: 2019-07-24

## 2019-07-24 ENCOUNTER — ANTICOAG VISIT (OUTPATIENT)
Dept: FAMILY MEDICINE CLINIC | Facility: HOSPITAL | Age: 34
End: 2019-07-24

## 2019-07-24 LAB — INR PPP: 2.3 (ref 0.84–1.19)

## 2019-07-30 LAB — INR PPP: 2.4 (ref 0.84–1.19)

## 2019-07-31 ENCOUNTER — ANTICOAG VISIT (OUTPATIENT)
Dept: FAMILY MEDICINE CLINIC | Facility: HOSPITAL | Age: 34
End: 2019-07-31

## 2019-07-31 ENCOUNTER — TELEPHONE (OUTPATIENT)
Dept: FAMILY MEDICINE CLINIC | Facility: HOSPITAL | Age: 34
End: 2019-07-31

## 2019-08-01 ENCOUNTER — TELEPHONE (OUTPATIENT)
Dept: FAMILY MEDICINE CLINIC | Facility: HOSPITAL | Age: 34
End: 2019-08-01

## 2019-08-21 ENCOUNTER — ANTICOAG VISIT (OUTPATIENT)
Dept: FAMILY MEDICINE CLINIC | Facility: HOSPITAL | Age: 34
End: 2019-08-21

## 2019-08-21 ENCOUNTER — TELEPHONE (OUTPATIENT)
Dept: FAMILY MEDICINE CLINIC | Facility: HOSPITAL | Age: 34
End: 2019-08-21

## 2019-08-21 LAB — INR PPP: 2.8 (ref 0.84–1.19)

## 2019-09-04 ENCOUNTER — ANTICOAG VISIT (OUTPATIENT)
Dept: FAMILY MEDICINE CLINIC | Facility: HOSPITAL | Age: 34
End: 2019-09-04

## 2019-09-04 ENCOUNTER — TELEPHONE (OUTPATIENT)
Dept: FAMILY MEDICINE CLINIC | Facility: HOSPITAL | Age: 34
End: 2019-09-04

## 2019-09-04 LAB — INR PPP: 3.5 (ref 0.84–1.19)

## 2019-09-04 NOTE — TELEPHONE ENCOUNTER
Yanira Contreras called with Migel's INR result  It was 3 5  Please call Yanira Contreras with coumadin instructions

## 2019-09-10 LAB — INR PPP: 3.2 (ref 0.84–1.19)

## 2019-09-12 ENCOUNTER — ANTICOAG VISIT (OUTPATIENT)
Dept: FAMILY MEDICINE CLINIC | Facility: HOSPITAL | Age: 34
End: 2019-09-12

## 2019-09-17 LAB — INR PPP: 1.8 (ref 0.84–1.19)

## 2019-09-18 ENCOUNTER — TELEPHONE (OUTPATIENT)
Dept: FAMILY MEDICINE CLINIC | Facility: HOSPITAL | Age: 34
End: 2019-09-18

## 2019-09-18 ENCOUNTER — ANTICOAG VISIT (OUTPATIENT)
Dept: FAMILY MEDICINE CLINIC | Facility: HOSPITAL | Age: 34
End: 2019-09-18

## 2019-09-24 LAB
INR PPP: 2.4 (ref 0.84–1.19)
INR PPP: 2.4 (ref 0.84–1.19)

## 2019-09-25 ENCOUNTER — TELEPHONE (OUTPATIENT)
Dept: FAMILY MEDICINE CLINIC | Facility: HOSPITAL | Age: 34
End: 2019-09-25

## 2019-09-25 ENCOUNTER — ANTICOAG VISIT (OUTPATIENT)
Dept: FAMILY MEDICINE CLINIC | Facility: HOSPITAL | Age: 34
End: 2019-09-25

## 2019-10-01 LAB — INR PPP: 2.5 (ref 0.84–1.19)

## 2019-10-02 ENCOUNTER — ANTICOAG VISIT (OUTPATIENT)
Dept: FAMILY MEDICINE CLINIC | Facility: HOSPITAL | Age: 34
End: 2019-10-02

## 2019-10-08 LAB — INR PPP: 2.8 (ref 0.84–1.19)

## 2019-10-09 ENCOUNTER — TELEPHONE (OUTPATIENT)
Dept: FAMILY MEDICINE CLINIC | Facility: HOSPITAL | Age: 34
End: 2019-10-09

## 2019-10-09 ENCOUNTER — ANTICOAG VISIT (OUTPATIENT)
Dept: FAMILY MEDICINE CLINIC | Facility: HOSPITAL | Age: 34
End: 2019-10-09

## 2019-10-09 DIAGNOSIS — T78.40XS ALLERGIC STATE, SEQUELA: ICD-10-CM

## 2019-10-09 RX ORDER — MONTELUKAST SODIUM 10 MG/1
10 TABLET ORAL
Qty: 30 TABLET | Refills: 3 | Status: SHIPPED | OUTPATIENT
Start: 2019-10-09 | End: 2020-01-30

## 2019-10-24 ENCOUNTER — TELEPHONE (OUTPATIENT)
Dept: FAMILY MEDICINE CLINIC | Facility: HOSPITAL | Age: 34
End: 2019-10-24

## 2019-10-24 ENCOUNTER — ANTICOAG VISIT (OUTPATIENT)
Dept: FAMILY MEDICINE CLINIC | Facility: HOSPITAL | Age: 34
End: 2019-10-24

## 2019-10-24 LAB — INR PPP: 2.9 (ref 0.84–1.19)

## 2019-10-30 ENCOUNTER — TELEPHONE (OUTPATIENT)
Dept: FAMILY MEDICINE CLINIC | Facility: HOSPITAL | Age: 34
End: 2019-10-30

## 2019-10-30 ENCOUNTER — OFFICE VISIT (OUTPATIENT)
Dept: FAMILY MEDICINE CLINIC | Facility: HOSPITAL | Age: 34
End: 2019-10-30
Payer: COMMERCIAL

## 2019-10-30 ENCOUNTER — ANTICOAG VISIT (OUTPATIENT)
Dept: FAMILY MEDICINE CLINIC | Facility: HOSPITAL | Age: 34
End: 2019-10-30

## 2019-10-30 VITALS
HEART RATE: 87 BPM | SYSTOLIC BLOOD PRESSURE: 126 MMHG | HEIGHT: 69 IN | DIASTOLIC BLOOD PRESSURE: 70 MMHG | OXYGEN SATURATION: 99 % | WEIGHT: 315 LBS | BODY MASS INDEX: 46.65 KG/M2

## 2019-10-30 DIAGNOSIS — Z23 NEED FOR INFLUENZA VACCINATION: ICD-10-CM

## 2019-10-30 DIAGNOSIS — R73.01 IMPAIRED FASTING GLUCOSE: ICD-10-CM

## 2019-10-30 DIAGNOSIS — Z83.3 FAMILY HISTORY OF DIABETES MELLITUS IN FATHER: ICD-10-CM

## 2019-10-30 DIAGNOSIS — I63.30 CEREBRAL THROMBOSIS WITH CEREBRAL INFARCTION (HCC): Primary | ICD-10-CM

## 2019-10-30 DIAGNOSIS — F34.1 DYSTHYMIA: ICD-10-CM

## 2019-10-30 DIAGNOSIS — E66.01 MORBID OBESITY WITH BMI OF 50.0-59.9, ADULT (HCC): ICD-10-CM

## 2019-10-30 DIAGNOSIS — R79.89 LOW TESTOSTERONE: ICD-10-CM

## 2019-10-30 LAB — INR PPP: 2.5 (ref 0.84–1.19)

## 2019-10-30 PROCEDURE — 3008F BODY MASS INDEX DOCD: CPT | Performed by: INTERNAL MEDICINE

## 2019-10-30 PROCEDURE — 99214 OFFICE O/P EST MOD 30 MIN: CPT | Performed by: INTERNAL MEDICINE

## 2019-10-30 PROCEDURE — 90471 IMMUNIZATION ADMIN: CPT

## 2019-10-30 PROCEDURE — 90682 RIV4 VACC RECOMBINANT DNA IM: CPT

## 2019-10-30 NOTE — PROGRESS NOTES
Assessment/Plan:             Problem List Items Addressed This Visit        Cardiovascular and Mediastinum    Cerebral thrombosis with cerebral infarction ([facility] [address] ) - Primary     No new changes- no residual from prior cva- on coumadin            Other    Low testosterone    Depression    Morbid obesity with BMI of 50 0-59 9, adult ([facility])            Subjective:      Patient ID: Max Kelly is a 27 y o  male    1  Dysthymia- has stressors at work- has night hours frequently- tired and now frustrated with his need to get by financially  Works every other weekend  Not doing any thing he can relate that he enjoys  In past liked to fish and bowl- doesn't have time to to what he wants to do  On off time spends with his son 6year old  2  Obesity- discussed exercise      The following portions of the patient's history were reviewed and updated as appropriate: allergies, current medications and problem list      Review of Systems   HENT: Positive for congestion  Using mucinex and flonase prn   Respiratory: Positive for cough  Had thick phlegm last  3-4  Weeks - girlfriend works in  and  Had illness   Cardiovascular: Negative for chest pain and palpitations  Gastrointestinal:        Nausea and intermittent heartburn- no swallowing issues-    All other systems reviewed and are negative          Objective:      Current Outpatient Medications:     albuterol (PROAIR HFA) 90 mcg/act inhaler, Inhale 2 puffs every 6 (six) hours as needed for wheezing, Disp: 1 each, Rfl: 2    amLODIPine (NORVASC) 10 mg tablet, Take 1 tablet (10 mg total) by mouth daily, Disp: 90 tablet, Rfl: 3    atorvastatin (LIPITOR) 40 mg tablet, Take 1 tablet (40 mg total) by mouth daily, Disp: 90 tablet, Rfl: 3    fexofenadine (ALLEGRA) 180 MG tablet, Take 180 mg by mouth daily, Disp: , Rfl:     fluticasone (FLONASE) 50 mcg/act nasal spray, 1 spray into each nostril daily, Disp: 16 g, Rfl: 0    guaiFENesin (MUCINEX) 600 mg 12 hr tablet, Take 1,200 mg by mouth every 12 (twelve) hours, Disp: , Rfl:     lisinopril-hydrochlorothiazide (PRINZIDE,ZESTORETIC) 20-12 5 MG per tablet, Take 1 tablet by mouth daily, Disp: 90 tablet, Rfl: 3    montelukast (SINGULAIR) 10 mg tablet, TAKE 1 TABLET (10 MG TOTAL) BY MOUTH DAILY AT BEDTIME, Disp: 30 tablet, Rfl: 3    spironolactone (ALDACTONE) 25 mg tablet, Take 1 tablet (25 mg total) by mouth daily, Disp: 90 tablet, Rfl: 3    warfarin (COUMADIN) 5 mg tablet, Take 2 tablets (10 mg total) by mouth daily As directed, Disp: 180 tablet, Rfl: 3    betamethasone dipropionate (DIPROSONE) 0 05 % cream, Apply topically 2 (two) times a day (Patient not taking: Reported on 10/30/2019), Disp: 30 g, Rfl: 0    diphenoxylate-atropine (LOMOTIL) 2 5-0 025 mg per tablet, Take 1 tablet by mouth 4 (four) times a day as needed for diarrhea (Patient not taking: Reported on 10/30/2019), Disp: 12 tablet, Rfl: 0    fluocinonide (LIDEX) 0 05 % cream, Apply topically 2 (two) times a day (Patient not taking: Reported on 10/30/2019), Disp: 30 g, Rfl: 0    Blood pressure 126/70, pulse 87, height 5' 9" (1 753 m), weight (!) 166 kg (366 lb), SpO2 99 %  BMI Counseling: Body mass index is 54 05 kg/m²  The BMI is above normal  Nutrition recommendations include 3-5 servings of fruits/vegetables daily and moderation in carbohydrate intake  Exercise recommendations include exercising 3-5 times per week  Physical Exam   Constitutional: He appears well-developed and well-nourished  HENT:   Head: Normocephalic and atraumatic  Nose: Nose normal    Mouth/Throat: Oropharynx is clear and moist    Minimal cerumen in right ear- wears blue tooth there- no purulent draiange   Eyes: Pupils are equal, round, and reactive to light  Conjunctivae are normal  Right eye exhibits no discharge  Left eye exhibits no discharge  Neck: Normal range of motion  No tracheal deviation present  No thyromegaly present     Cardiovascular: Normal rate and regular rhythm  Pulmonary/Chest: Effort normal and breath sounds normal    No wheezes   Abdominal: Soft  He exhibits no distension  There is no tenderness  Obese, nontender   Musculoskeletal: He exhibits no edema  Nonpitting leg edema plus one   Lymphadenopathy:     He has no cervical adenopathy  Skin: Skin is warm and dry  No rash noted  Nursing note and vitals reviewed

## 2019-11-12 LAB — INR PPP: 2.5 (ref 0.84–1.19)

## 2019-11-13 ENCOUNTER — ANTICOAG VISIT (OUTPATIENT)
Dept: FAMILY MEDICINE CLINIC | Facility: HOSPITAL | Age: 34
End: 2019-11-13

## 2019-11-13 ENCOUNTER — TELEPHONE (OUTPATIENT)
Dept: FAMILY MEDICINE CLINIC | Facility: HOSPITAL | Age: 34
End: 2019-11-13

## 2019-11-13 NOTE — TELEPHONE ENCOUNTER
Pt INR 11/12/2019 was 2 5     Goal 2 0-3 0    Dose 10 mg Sun/Fri, 7 5 mg all other days     Please advise DD

## 2019-11-14 DIAGNOSIS — I10 HYPERTENSION, UNSPECIFIED TYPE: ICD-10-CM

## 2019-11-15 RX ORDER — LISINOPRIL AND HYDROCHLOROTHIAZIDE 20; 12.5 MG/1; MG/1
TABLET ORAL
Qty: 90 TABLET | Refills: 3 | Status: SHIPPED | OUTPATIENT
Start: 2019-11-15 | End: 2020-06-18 | Stop reason: SDUPTHER

## 2019-11-24 ENCOUNTER — TELEPHONE (OUTPATIENT)
Dept: OTHER | Facility: OTHER | Age: 34
End: 2019-11-24

## 2019-11-25 ENCOUNTER — TELEPHONE (OUTPATIENT)
Dept: FAMILY MEDICINE CLINIC | Facility: HOSPITAL | Age: 34
End: 2019-11-25

## 2019-11-25 ENCOUNTER — OFFICE VISIT (OUTPATIENT)
Dept: FAMILY MEDICINE CLINIC | Facility: HOSPITAL | Age: 34
End: 2019-11-25
Payer: COMMERCIAL

## 2019-11-25 ENCOUNTER — HOSPITAL ENCOUNTER (OUTPATIENT)
Dept: RADIOLOGY | Facility: HOSPITAL | Age: 34
Discharge: HOME | End: 2019-11-25
Payer: COMMERCIAL

## 2019-11-25 VITALS
DIASTOLIC BLOOD PRESSURE: 78 MMHG | BODY MASS INDEX: 46.65 KG/M2 | HEIGHT: 69 IN | WEIGHT: 315 LBS | SYSTOLIC BLOOD PRESSURE: 122 MMHG | HEART RATE: 96 BPM

## 2019-11-25 DIAGNOSIS — M25.511 ACUTE PAIN OF RIGHT SHOULDER: Primary | ICD-10-CM

## 2019-11-25 DIAGNOSIS — S49.91XA RIGHT SHOULDER INJURY, INITIAL ENCOUNTER: ICD-10-CM

## 2019-11-25 PROCEDURE — 73030 X-RAY EXAM OF SHOULDER: CPT

## 2019-11-25 PROCEDURE — 99213 OFFICE O/P EST LOW 20 MIN: CPT | Performed by: PHYSICIAN ASSISTANT

## 2019-11-25 RX ORDER — ACETAMINOPHEN AND CODEINE PHOSPHATE 300; 30 MG/1; MG/1
1 TABLET ORAL EVERY 4 HOURS PRN
Qty: 30 TABLET | Refills: 0 | Status: SHIPPED | OUTPATIENT
Start: 2019-11-25 | End: 2022-01-21 | Stop reason: ALTCHOICE

## 2019-11-25 NOTE — PATIENT INSTRUCTIONS
Warned against using NSAID's over the counter, recommend Tylenol with Codeine, prn tid  Sent to get shoulder xray

## 2019-11-25 NOTE — TELEPHONE ENCOUNTER
Joey Mathis 1985  CONFIDENTIALTY NOTICE: This fax transmission is intended only for the addressee  It contains information that is legally privileged,  confidential or otherwise protected from use or disclosure  If you are not the intended recipient, you are strictly prohibited from reviewing,  disclosing, copying using or disseminating any of this information or taking any action in reliance on or regarding this information  If you have  received this fax in error, please notify us immediately by telephone so that we can arrange for its return to us  Page:   Call Id: [id number]  Health Call  Standard Call Report  Health Call  Patient Name: Joey Mathis  Gender: Male  : 1985  Age: 27 Y 8 M 16 D  Return Phone  Number: [phone] (Current)  Address: [place] [address]  City/State/Zip: [address] [place] [address]  Practice Name: [facility]  Practice Charged:  Physician:  Bjorn Delarosa Name:  Relationship To  Patient: Self  Return Phone Number: [phone] (Current)  Presenting Problem: "I injured my shoulder and am having  really bad pain "  Service Type: Triage  Charged Service 1: N/A  Pharmacy Name and  Number:  Nurse Assessment  Nurse: Nallely Galloway Date/Time: 2019 7:50:45 PM  Type of assessment required:  ---General (Adult or Child)  Duration of Current S/S  ---Today at 0100 in the morning  Location/Radiation  ---Right shoulder  Temperature (F) and route:  ---Denies fever  Symptom Specific Meds (Dose/Time):  ---ibuprofen LD: 1800  Other S/S  ---Injured his shoulder early this morning while pulling a cable  States he heard  something pop  Is describing severe pain in the shoulder area right now  Some numbing  to his fingertips  States he can move his shoulder but it hurts a lot    Pain Scale on scale of 1-10, 10 being the worst:  ---8/10  Symptom progression:  ---worse  Intake and Output  Joey Mathis 1985  CONFIDENTIALTY NOTICE: This fax transmission is intended only for the addressee  It contains information that is legally privileged,  confidential or otherwise protected from use or disclosure  If you are not the intended recipient, you are strictly prohibited from reviewing,  disclosing, copying using or disseminating any of this information or taking any action in reliance on or regarding this information  If you have  received this fax in error, please notify us immediately by telephone so that we can arrange for its return to us  Page: 2 of 2  Call Id: [id number]  Nurse Assessment  ---Drinking normally / WNL  Last Exam/Treatment:  ---End of October in the office  Protocols  Protocol Title Nurse Date/Time  Shoulder Injury Prosper Ashby, [address] [place] [address], Carmencita Hart 11/24/2019 7:53:06 PM  Question Caller Affirmed  Disp  Time Disposition Final User  11/24/2019 8:03:29 PM See Physician within 4 Hours (or PCP  triage)  rPosper Ashby RN, Carmencita Hart  11/24/2019 8:04:03 PM RN Triaged Prosper Ashby RN, Carmencita Hart  11/24/2019 8:04:42 PM RN Triaged Yes Prosper Ashby RN, [address] [id number] Advice Given Per Protocol  SEE PHYSICIAN WITHIN 4 HOURS (or PCP triage): * IF OFFICE WILL BE CLOSED AND NO PCP TRIAGE: You need to be seen  within the next 3 or 4 hours  A nearby Urgent Care Center is often a good source of care  Another choice is to go to the ER  Go sooner if  you become worse  FIRST AID ADVICE - SLING: * Use an arm sling to reduce the pain  * You can make a sling with a triangular piece  of cloth or you can buy one at a pharmacy  LOCAL COLD: * Apply a cold pack or an ice bag (wrapped in a moist towel) to the area for  20 minutes  Repeat in 1 hour, then every 4 hours while awake  * Continue this for the first 48 hours after an injury (Reason: to reduce the  swelling and pain)  PAIN MEDICINES: IBUPROFEN (E G , MOTRIN, ADVIL): * Take 400 mg (two 200 mg pills) by mouth every  6 hours as needed  * Another choice is to take 600 mg (three 200 mg pills) by mouth every 8 hours as needed   CAUTION - NSAIDS  (E G , IBUPROFEN, NAPROXEN): * Do not take nonsteroidal anti-inflammatory drugs (NSAIDs) if you have stomach problems,  kidney disease, heart failure, or other contraindications to using this type of medication  * Do not take NSAID medications for over 7  days without consulting your PCP  * Do not take NSAID medications if you are pregnant  * You may take this medicine with or without  food  Taking it with food or milk may lessen the chance the drug will upset your stomach  * GASTROINTESTINAL RISK: There is an  increased risk of stomach ulcers, GI bleeding, perforation  * CARDIOVASCULAR RISK: There may be an increased risk of heart attack  and stroke  CALL BACK IF: * You become worse  CARE ADVICE given per Shoulder Injury (Adult) guideline  Caller Understands: Yes  Caller Disagree/Comply: Disagree  PreDisposition: Unsure  Comments  User: Filiberto Robles RN Date/Time: 11/24/2019 8:04:35 PM  Patient declined ED visit  States he cannot afford a hospital bill and just wants to reach out to the office tomorrow morning

## 2019-11-25 NOTE — TELEPHONE ENCOUNTER
Pt has xray of shoulder done today  Please call with results  The patient has an appt scheduled on Tues 11/26 with Dr Mike Godoy

## 2019-11-25 NOTE — PROGRESS NOTES
Assessment/Plan:         Diagnoses and all orders for this visit:    Right shoulder pain    Right shoulder injury, initial encounter  -     XR shoulder 2+ vw right; Future  -     acetaminophen-codeine (TYLENOL/CODEINE #3) 300-30 MG per tablet; Take 1 tablet by mouth every 4 (four) hours as needed for moderate pain  -     Ambulatory referral to Orthopedic Surgery; Future        Subjective:      Patient ID: Hue Woodson is a 27 y o  male  27year old AA male c/o right shoulder pain, after trying to pull metal rope, trying to tow car  Patient drives a tow truck  Colan Ayaka a pop immediately, and pain, with tingling and numbness affecting right hand  Incident occurred Sunday morning, 1:30 am    Theora Begun if going to file work comp  Took 2 Ibuprofen for pain, and using icy hot rubbing cream, helped a little  Review of Systems   Respiratory: Negative for cough and shortness of breath  Musculoskeletal: Positive for arthralgias, myalgias, neck pain and neck stiffness  Negative for back pain  Neurological: Positive for numbness  Negative for tremors and weakness  Objective:      /78   Pulse 96   Ht 5' 9" (1 753 m)   Wt (!) 165 kg (364 lb)   BMI 53 75 kg/m²          Physical Exam   Constitutional: He is oriented to person, place, and time  He appears well-developed and well-nourished  No distress  Pulmonary/Chest: Effort normal and breath sounds normal  No stridor  No respiratory distress  He has no wheezes  He has no rales  Musculoskeletal: He exhibits tenderness  He exhibits no edema or deformity  Limited rotation of movement, right upper ext  Tender over right trapezius and right shoulder  Neurological: He is alert and oriented to person, place, and time  Skin: He is not diaphoretic  Nursing note and vitals reviewed

## 2019-11-26 ENCOUNTER — OFFICE VISIT (OUTPATIENT)
Dept: OBGYN CLINIC | Facility: CLINIC | Age: 34
End: 2019-11-26
Payer: OTHER MISCELLANEOUS

## 2019-11-26 VITALS
WEIGHT: 315 LBS | BODY MASS INDEX: 47.74 KG/M2 | HEIGHT: 68 IN | DIASTOLIC BLOOD PRESSURE: 82 MMHG | SYSTOLIC BLOOD PRESSURE: 122 MMHG | HEART RATE: 86 BPM

## 2019-11-26 DIAGNOSIS — S46.911A STRAIN OF RIGHT SHOULDER, INITIAL ENCOUNTER: ICD-10-CM

## 2019-11-26 PROCEDURE — 99213 OFFICE O/P EST LOW 20 MIN: CPT | Performed by: ORTHOPAEDIC SURGERY

## 2019-11-26 NOTE — PROGRESS NOTES
Assessment:     1  Strain of right shoulder, initial encounter        Plan:  The patient was seen and examined by Dr Mary Cardoza and myself  Problem List Items Addressed This Visit        Musculoskeletal and Integument    Strain of right shoulder     Findings consistent with right shoulder strain, possible injury to long head of biceps and/or distal biceps tendon  Findings and treatment options were discussed with the patient x-rays reviewed with them  Recommend MRI of the right shoulder to further evaluate the joint  Patient is currently working out issues with his job in getting the [employer] comp claim  Continue ice and Tylenol No   3 as needed  Patient cannot take NSAIDs due to being on warfarin  He cannot use the right upper extremity for any lifting, pushing or pulling  A work note was given to him today  Follow-up with MRI results and Dr Mary Cardoza will make further treatment recommendations at that time  All questions were answered to their satisfaction  Subjective:     Patient ID: Shweta Hunter is a 27 y o  male  Chief Complaint: This is a right-hand-dominant 79-year-old [facility] [employer] male who suffered a work related injury to his right shoulder on November 24, 2019  Patient states he 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 and felt several pops in his right shoulder followed by pain  He had difficulty moving his arm due to pain afterwards  He was seen by his PCP the next day was sent for x-rays and prescribed him Tylenol with codeine that provided some relief  He was then referred to Orthopedics  He continues to feel pain over his biceps proximally and distally at the elbow  He denies any bruising in the area  He states he was having some shoulder pain prior to this injury  He does not know of any prior injury to his right shoulder that caused the pain  He states the pain he has after this injury is completely different from the pain he had before    Patient intake form was reviewed today  Allergy:  Allergies   Allergen Reactions    Amoxicillin     Azithromycin     Cefaclor     Pollen Extract      Medications:  all current active meds have been reviewed  Past Medical History:  Past Medical History:   Diagnosis Date    Hypertension     Obesity     Stroke [facility])      Past Surgical History:  Past Surgical History:   Procedure Laterality Date    HERNIA REPAIR      INGUINAL HERNIA REPAIR       Family History:  Family History   Problem Relation Age of Onset   [de-identified] Cancer Mother     Hypertension Mother     Thyroid disease Mother     Cancer Father     Diabetes Father     Hypertension Father     Stroke Father     Prostate cancer Other     Substance Abuse Neg Hx         neg fam hx    Mental illness Neg Hx         neg fam hx     Social History:  Social History     Substance and Sexual Activity   Alcohol Use Yes    Frequency: Monthly or less    Drinks per session: 1 or 2    Binge frequency: Never    Comment: occasional     Social History     Substance and Sexual Activity   Drug Use No     Social History     Tobacco Use   Smoking Status Never Smoker   Smokeless Tobacco Never Used     Review of Systems   Constitutional: Negative  HENT: Negative  Eyes: Negative  Respiratory: Negative  Cardiovascular: Negative  Gastrointestinal: Negative  Endocrine: Negative  Genitourinary: Negative  Musculoskeletal: Positive for arthralgias  Skin: Negative  Allergic/Immunologic: Negative  Neurological: Negative  Hematological: Negative  Psychiatric/Behavioral: Negative  Objective:  BP Readings from Last 1 Encounters:   11/26/19 122/82      Wt Readings from Last 1 Encounters:   11/26/19 (!) 162 kg (358 lb)      BMI:   Estimated body mass index is 55 24 kg/m² as calculated from the following:    Height as of this encounter: 5' 7 5" (1 715 m)  Weight as of this encounter: 162 kg (358 lb)    BSA:   Estimated body surface area is 2 6 meters squared as calculated from the following:    Height as of this encounter: 5' 7 5" (1 715 m)  Weight as of this encounter: 162 kg (358 lb)  Physical Exam   Constitutional: He is oriented to person, place, and time  He appears well-developed  HENT:   Head: Normocephalic and atraumatic  Eyes: Conjunctivae and EOM are normal    Neck: Neck supple  Neurological: He is alert and oriented to person, place, and time  Skin: Skin is warm  Psychiatric: He has a normal mood and affect  Nursing note and vitals reviewed  Right Shoulder Exam     Tenderness   The patient is experiencing tenderness in the biceps tendon (Proximal and distal)  Range of Motion   Active abduction: 90   Passive abduction: normal   Forward flexion: 130   Internal rotation 0 degrees: Mid thoracic     Muscle Strength   Abduction: 5/5   Internal rotation: 5/5   External rotation: 5/5   Biceps: 5/5     Tests   Valles test: negative  Cross arm: negative  Impingement: negative  Drop arm: negative    Other   Erythema: absent  Scars: absent  Sensation: normal  Pulse: present    Comments:  No visual deformity or palpable defect over biceps muscle or distal biceps tendon  Positive speed's  Negative empty can  Pain with resisted supination and elbow flexion but no weakness              I have personally reviewed pertinent films in PACS and my interpretation is X-rays of the right shoulder reveal no abnormalities  No soft tissue calcifications

## 2019-11-26 NOTE — ASSESSMENT & PLAN NOTE
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

## 2019-11-26 NOTE — LETTER
November 26, 2019     Patient: Shellie Phalen   YOB: 1985   Date of Visit: 11/26/2019       To Whom it May Concern:    Dia Bhat is under my professional care  He was seen in my office on 11/26/2019  He cannot use the right arm for any lifting, pushing or pulling at work  He will follow-up after the MRI of his right shoulder for further treatment recommendations  If you have any questions or concerns, please don't hesitate to call           Sincerely,          Liana Umana MD        CC: No Recipients

## 2019-11-27 ENCOUNTER — TELEPHONE (OUTPATIENT)
Dept: OBGYN CLINIC | Facility: CLINIC | Age: 34
End: 2019-11-27

## 2019-11-27 ENCOUNTER — ANTICOAG VISIT (OUTPATIENT)
Dept: FAMILY MEDICINE CLINIC | Facility: HOSPITAL | Age: 34
End: 2019-11-27

## 2019-11-27 ENCOUNTER — TELEPHONE (OUTPATIENT)
Dept: FAMILY MEDICINE CLINIC | Facility: HOSPITAL | Age: 34
End: 2019-11-27

## 2019-11-27 DIAGNOSIS — M25.521 PAIN IN RIGHT ELBOW: ICD-10-CM

## 2019-11-27 DIAGNOSIS — S46.911A STRAIN OF RIGHT SHOULDER, INITIAL ENCOUNTER: Primary | ICD-10-CM

## 2019-11-27 LAB — INR PPP: 2.3 (ref 0.84–1.19)

## 2019-11-27 NOTE — TELEPHONE ENCOUNTER
Pt came to the office today stating that he would like to proceed with the MRI  He was able to get all the information for his workers comp  Pt would like to  the MRI script in the office  Pt can be reached at [phone]  Pt would also like the MRI order faxed to [facility] @ [phone] and also to [place]

## 2019-11-27 NOTE — TELEPHONE ENCOUNTER
Please contact the patient let him know that MRIs of the shoulder and elbow were ordered as was discussed in the office yesterday  Can you please fax the paper scripts to the number requested as well and any other party that he would like them sent to    Thanks

## 2019-11-29 NOTE — TELEPHONE ENCOUNTER
I spoke with the pt he will come to the office to  the orders   The order was also faxed to [employer] (which pt is aware)

## 2019-12-04 ENCOUNTER — OFFICE VISIT (OUTPATIENT)
Dept: OBGYN CLINIC | Facility: CLINIC | Age: 34
End: 2019-12-04
Payer: OTHER MISCELLANEOUS

## 2019-12-04 VITALS
WEIGHT: 315 LBS | BODY MASS INDEX: 47.74 KG/M2 | HEART RATE: 82 BPM | HEIGHT: 68 IN | SYSTOLIC BLOOD PRESSURE: 122 MMHG | DIASTOLIC BLOOD PRESSURE: 78 MMHG

## 2019-12-04 DIAGNOSIS — S46.911A STRAIN OF RIGHT SHOULDER, INITIAL ENCOUNTER: Primary | ICD-10-CM

## 2019-12-04 DIAGNOSIS — S46.911A STRAIN OF RIGHT ELBOW, INITIAL ENCOUNTER: ICD-10-CM

## 2019-12-04 PROBLEM — S56.911A STRAIN OF RIGHT ELBOW: Status: ACTIVE | Noted: 2019-12-04

## 2019-12-04 PROCEDURE — 99213 OFFICE O/P EST LOW 20 MIN: CPT | Performed by: ORTHOPAEDIC SURGERY

## 2019-12-04 RX ORDER — CYCLOBENZAPRINE HCL 10 MG
10 TABLET ORAL
Qty: 30 TABLET | Refills: 0 | Status: SHIPPED | OUTPATIENT
Start: 2019-12-04 | End: 2021-10-13 | Stop reason: ALTCHOICE

## 2019-12-04 NOTE — LETTER
December 4, 2019     Patient: Charity Trujillo   YOB: 1985   Date of Visit: 12/4/2019       To Whom it May Concern:    Lori Franz is under my professional care  He was seen in my office on 12/4/2019  He may return to work with limitations use of the right arm, no overhead use, pushing, pulling or lifting until the next evaluation       If you have any questions or concerns, please don't hesitate to call           Sincerely,          Jigna Mata MD        CC: No Recipients

## 2019-12-04 NOTE — PROGRESS NOTES
Assessment:     1  Strain of right shoulder, initial encounter    2  Strain of right elbow, initial encounter        Plan:     Problem List Items Addressed This Visit        Musculoskeletal and Integument    Strain of right shoulder - Primary    Relevant Medications    cyclobenzaprine (FLEXERIL) 10 mg tablet    Other Relevant Orders    Ambulatory referral to Physical Therapy    Strain of right elbow    Relevant Medications    cyclobenzaprine (FLEXERIL) 10 mg tablet    Other Relevant Orders    Ambulatory referral to Physical Therapy          - MRIs of the right shoulder and elbow that is essentially within in normal limits  - Recommends formal PT for both the right shoulder and the elbow  - Use Tylenol as needed for pain since he can't take oral NSAIDs  - provided patient muscle relaxant medication  - we will limit his work activities using the right arm  - Follow up in 4 weeks  - All patient's questions were answered to their satisfaction  This note is created using dictation transcription  It may contain typographical errors, grammatical errors, improperly dictated words, background noise and other errors  Subjective:     Patient ID: Rafiq Lancaster is a 27 y o  male  Chief Complaint: Right shoulder pain    HPI:  Patient is here today to discuss an MRI of the RIGHT shoulder to evaluate further evaluation due to continued pain although treating conservatively with ice, tylenol with codeine  He is on Warfrin and can not take NSAIDs  An Elbow MRI is to be reviewed today as well to to evaluate for a biceps tear  He suffered a work related injury to his right shoulder on November 24, 2019  Patient states he 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 and felt several pops in his right shoulder followed by pain  He had difficulty moving his arm due to pain afterwards      Allergy:  Allergies   Allergen Reactions    Amoxicillin     Azithromycin     Cefaclor     Pollen Extract      Medications:  all current active meds have been reviewed  Past Medical History:  Past Medical History:   Diagnosis Date    Hypertension     Obesity     Stroke [facility])      Past Surgical History:  Past Surgical History:   Procedure Laterality Date    HERNIA REPAIR      INGUINAL HERNIA REPAIR       Family History:  Family History   Problem Relation Age of Onset   Reg Ortega Cancer Mother     Hypertension Mother     Thyroid disease Mother     Cancer Father     Diabetes Father     Hypertension Father     Stroke Father     Prostate cancer Other     Substance Abuse Neg Hx         neg fam hx    Mental illness Neg Hx         neg fam hx     Social History:  Social History     Substance and Sexual Activity   Alcohol Use Yes    Frequency: Monthly or less    Drinks per session: 1 or 2    Binge frequency: Never    Comment: occasional     Social History     Substance and Sexual Activity   Drug Use No     Social History     Tobacco Use   Smoking Status Never Smoker   Smokeless Tobacco Never Used     Review of Systems   Constitutional: Negative  HENT: Negative  Eyes: Negative  Respiratory: Negative  Cardiovascular: Negative  Gastrointestinal: Negative  Endocrine: Negative  Genitourinary: Negative  Musculoskeletal: Positive for arthralgias (Right shoulder and upper extremity)  Skin: Negative  Neurological: Negative  Hematological: Negative  Psychiatric/Behavioral: Negative  Objective:  BP Readings from Last 1 Encounters:   12/04/19 122/78      Wt Readings from Last 1 Encounters:   12/04/19 (!) 162 kg (358 lb)      BMI:   Estimated body mass index is 55 24 kg/m² as calculated from the following:    Height as of this encounter: 5' 7 5" (1 715 m)  Weight as of this encounter: 162 kg (358 lb)  BSA:   Estimated body surface area is 2 6 meters squared as calculated from the following:    Height as of this encounter: 5' 7 5" (1 715 m)  Weight as of this encounter: 162 kg (358 lb)     Physical Exam Constitutional: He is oriented to person, place, and time  He appears well-developed  Morbidly obese   HENT:   Head: Normocephalic and atraumatic  Eyes: Conjunctivae and EOM are normal    Neck: Neck supple  Pulmonary/Chest: Effort normal    Neurological: He is alert and oriented to person, place, and time  Skin: Skin is warm  Psychiatric: He has a normal mood and affect  Nursing note and vitals reviewed  Right Elbow Exam     Tenderness   The patient is experiencing tenderness in the lateral epicondyle (Anteriorly)  Range of Motion   The patient has normal right elbow ROM  Muscle Strength   Pronation:  5/5   Supination:  4/5     Comments:  No palpable defect over the distal biceps tendon and biceps muscle  Right Shoulder Exam     Tenderness   Right shoulder tenderness location: Anteriorly  Range of Motion   Active abduction: 90 (Pain)   Passive abduction: normal   Forward flexion: 130 (Pain)   Internal rotation 0 degrees: Lumbar     Muscle Strength   Abduction: 5/5   Internal rotation: 5/5   External rotation: 5/5   Biceps: 5/5     Tests   Valles test: negative  Cross arm: negative  Impingement: negative  Drop arm: negative    Other   Erythema: absent  Sensation: normal  Pulse: present    Comments:  Positive speed's  Negative empty can  Pain with resisted supination and elbow flexion but no weakness               I have personally reviewed pertinent films in PACS and my interpretation is right shoulder MRI show a rotator cuff and labrum are intact  There is no increased signal activity within the proximal humerus  MRI of the right elbow show distal biceps tendon is intact There is mild degenerative changes in the elbow joint      Scribe Attestation    I,:   Enid Hernandez am acting as a scribe while in the presence of the attending physician :        I,:   Pratima Ny MD personally performed the services described in this documentation    as scribed in my presence :

## 2019-12-11 ENCOUNTER — ANTICOAG VISIT (OUTPATIENT)
Dept: FAMILY MEDICINE CLINIC | Facility: HOSPITAL | Age: 34
End: 2019-12-11

## 2019-12-11 ENCOUNTER — TELEPHONE (OUTPATIENT)
Dept: FAMILY MEDICINE CLINIC | Facility: HOSPITAL | Age: 34
End: 2019-12-11

## 2019-12-11 LAB — INR PPP: 2.8 (ref 0.84–1.19)

## 2019-12-27 ENCOUNTER — ANTICOAG VISIT (OUTPATIENT)
Dept: FAMILY MEDICINE CLINIC | Facility: HOSPITAL | Age: 34
End: 2019-12-27

## 2019-12-27 ENCOUNTER — TELEPHONE (OUTPATIENT)
Dept: FAMILY MEDICINE CLINIC | Facility: HOSPITAL | Age: 34
End: 2019-12-27

## 2019-12-27 LAB — INR PPP: 2.2 (ref 0.84–1.19)

## 2020-01-07 LAB — INR PPP: 2.6 (ref 0.84–1.19)

## 2020-01-08 ENCOUNTER — ANTICOAG VISIT (OUTPATIENT)
Dept: FAMILY MEDICINE CLINIC | Facility: HOSPITAL | Age: 35
End: 2020-01-08

## 2020-01-14 ENCOUNTER — OFFICE VISIT (OUTPATIENT)
Dept: OBGYN CLINIC | Facility: CLINIC | Age: 35
End: 2020-01-14
Payer: OTHER MISCELLANEOUS

## 2020-01-14 VITALS
HEART RATE: 80 BPM | BODY MASS INDEX: 47.74 KG/M2 | HEIGHT: 68 IN | DIASTOLIC BLOOD PRESSURE: 78 MMHG | WEIGHT: 315 LBS | SYSTOLIC BLOOD PRESSURE: 118 MMHG

## 2020-01-14 DIAGNOSIS — S46.911D STRAIN OF RIGHT SHOULDER, SUBSEQUENT ENCOUNTER: Primary | ICD-10-CM

## 2020-01-14 DIAGNOSIS — S46.911D STRAIN OF RIGHT ELBOW, SUBSEQUENT ENCOUNTER: ICD-10-CM

## 2020-01-14 PROCEDURE — 99213 OFFICE O/P EST LOW 20 MIN: CPT | Performed by: ORTHOPAEDIC SURGERY

## 2020-01-14 NOTE — PROGRESS NOTES
Assessment:     1  Strain of right shoulder, subsequent encounter    2  Strain of right elbow, subsequent encounter        Plan:     Problem List Items Addressed This Visit        Musculoskeletal and Integument    Strain of right shoulder - Primary    Relevant Orders    Ambulatory referral to Physical Therapy    Strain of right elbow    Relevant Orders    Ambulatory referral to Physical Therapy          The patient was seen and examined by Dr Theron Ramírez and myself  Findings consistent with right shoulder and right elbow strains-improving  Findings and treatment options were discussed with the patient  Recommending continue physical therapy for work hardening  He is to continue to work with restrictions  Follow-up in 4 weeks for re-evaluation  All patient's questions were answered to their satisfaction  This note is created using dictation transcription  It may contain typographical errors, grammatical errors, improperly dictated words, background noise and other errors  Subjective:     Patient ID: Sunil Cobos is a 38 y o  male  Chief Complaint: Right shoulder pain    HPI:  This is a 34-year-old male following up for right shoulder and right elbow strains  He suffered a work related injury to his right shoulder on November 24, 2019  Patient states he 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 and felt several pops in his right shoulder followed by pain  He has been attending physical therapy twice a week since last visit  He states he feels about 60% better  His pain has decreased significantly      Allergy:  Allergies   Allergen Reactions    Amoxicillin     Azithromycin     Cefaclor     Pollen Extract      Medications:  all current active meds have been reviewed  Past Medical History:  Past Medical History:   Diagnosis Date    Hypertension     Obesity     Stroke [facility])      Past Surgical History:  Past Surgical History:   Procedure Laterality Date    HERNIA REPAIR      INGUINAL HERNIA REPAIR Family History:  Family History   Problem Relation Age of Onset   Wilber Cochran Cancer Mother     Hypertension Mother     Thyroid disease Mother     Cancer Father     Diabetes Father     Hypertension Father     Stroke Father     Prostate cancer Other     Substance Abuse Neg Hx         neg fam hx    Mental illness Neg Hx         neg fam hx     Social History:  Social History     Substance and Sexual Activity   Alcohol Use Yes    Frequency: Monthly or less    Drinks per session: 1 or 2    Binge frequency: Never    Comment: occasional     Social History     Substance and Sexual Activity   Drug Use No     Social History     Tobacco Use   Smoking Status Never Smoker   Smokeless Tobacco Never Used     Review of Systems   Constitutional: Negative  HENT: Negative  Eyes: Negative  Respiratory: Negative  Cardiovascular: Negative  Gastrointestinal: Negative  Endocrine: Negative  Genitourinary: Negative  Musculoskeletal: Negative for arthralgias (Right shoulder and upper extremity)  Skin: Negative  Neurological: Negative  Hematological: Negative  Psychiatric/Behavioral: Negative  Objective:  BP Readings from Last 1 Encounters:   01/14/20 118/78      Wt Readings from Last 1 Encounters:   01/14/20 (!) 170 kg (374 lb)      BMI:   Estimated body mass index is 57 71 kg/m² as calculated from the following:    Height as of this encounter: 5' 7 5" (1 715 m)  Weight as of this encounter: 170 kg (374 lb)  BSA:   Estimated body surface area is 2 65 meters squared as calculated from the following:    Height as of this encounter: 5' 7 5" (1 715 m)  Weight as of this encounter: 170 kg (374 lb)  Physical Exam   Constitutional: He is oriented to person, place, and time  He appears well-developed  Morbidly obese   HENT:   Head: Normocephalic and atraumatic  Eyes: Conjunctivae and EOM are normal    Neck: Neck supple     Pulmonary/Chest: Effort normal    Neurological: He is alert and oriented to person, place, and time  Skin: Skin is warm  Psychiatric: He has a normal mood and affect  Nursing note and vitals reviewed  Right Elbow Exam     Tenderness   The patient is experiencing tenderness in the lateral epicondyle (Anteriorly)  Range of Motion   The patient has normal right elbow ROM  Muscle Strength   Pronation:  5/5   Supination:  4/5     Comments:  No palpable defect over the distal biceps tendon and biceps muscle  Right Shoulder Exam     Tenderness   Right shoulder tenderness location: Anteriorly  Range of Motion   Active abduction: normal   Forward flexion: normal   Internal rotation 0 degrees: Lumbar     Muscle Strength   Abduction: 5/5   Internal rotation: 5/5   External rotation: 5/5   Biceps: 5/5     Tests   Valles test: negative  Cross arm: negative  Impingement: negative  Drop arm: negative    Other   Erythema: absent  Sensation: normal  Pulse: present    Comments:  No pain with resisted strength testing               No new imaging today

## 2020-01-23 ENCOUNTER — TELEPHONE (OUTPATIENT)
Dept: FAMILY MEDICINE CLINIC | Facility: HOSPITAL | Age: 35
End: 2020-01-23

## 2020-01-23 ENCOUNTER — ANTICOAG VISIT (OUTPATIENT)
Dept: FAMILY MEDICINE CLINIC | Facility: HOSPITAL | Age: 35
End: 2020-01-23

## 2020-01-23 LAB — INR PPP: 2.4 (ref 0.84–1.19)

## 2020-01-30 DIAGNOSIS — T78.40XS ALLERGIC STATE, SEQUELA: ICD-10-CM

## 2020-01-30 RX ORDER — MONTELUKAST SODIUM 10 MG/1
TABLET ORAL
Qty: 30 TABLET | Refills: 11 | Status: SHIPPED | OUTPATIENT
Start: 2020-01-30 | End: 2020-03-05 | Stop reason: SDUPTHER

## 2020-01-31 ENCOUNTER — TELEPHONE (OUTPATIENT)
Dept: OBGYN CLINIC | Facility: CLINIC | Age: 35
End: 2020-01-31

## 2020-01-31 NOTE — TELEPHONE ENCOUNTER
Patient   [phone]  Dr Kyle Quiñonez    Patient is calling in asking if Dr Kyle Quiñonez still has his MRI disc that he brought in last year? he said that he may have it somewhere, but he isnt sure  Please have someone follow up with him about this

## 2020-02-06 LAB — INR PPP: 2.3 (ref 0.84–1.19)

## 2020-02-07 ENCOUNTER — TELEPHONE (OUTPATIENT)
Dept: FAMILY MEDICINE CLINIC | Facility: HOSPITAL | Age: 35
End: 2020-02-07

## 2020-02-07 ENCOUNTER — ANTICOAG VISIT (OUTPATIENT)
Dept: FAMILY MEDICINE CLINIC | Facility: HOSPITAL | Age: 35
End: 2020-02-07

## 2020-02-11 ENCOUNTER — OFFICE VISIT (OUTPATIENT)
Dept: OBGYN CLINIC | Facility: CLINIC | Age: 35
End: 2020-02-11
Payer: OTHER MISCELLANEOUS

## 2020-02-11 VITALS
WEIGHT: 315 LBS | BODY MASS INDEX: 47.74 KG/M2 | DIASTOLIC BLOOD PRESSURE: 80 MMHG | HEIGHT: 68 IN | HEART RATE: 82 BPM | SYSTOLIC BLOOD PRESSURE: 122 MMHG

## 2020-02-11 DIAGNOSIS — S46.911D STRAIN OF RIGHT ELBOW, SUBSEQUENT ENCOUNTER: ICD-10-CM

## 2020-02-11 DIAGNOSIS — S46.911D STRAIN OF RIGHT SHOULDER, SUBSEQUENT ENCOUNTER: Primary | ICD-10-CM

## 2020-02-11 PROCEDURE — 3074F SYST BP LT 130 MM HG: CPT | Performed by: ORTHOPAEDIC SURGERY

## 2020-02-11 PROCEDURE — 1036F TOBACCO NON-USER: CPT | Performed by: ORTHOPAEDIC SURGERY

## 2020-02-11 PROCEDURE — 3079F DIAST BP 80-89 MM HG: CPT | Performed by: ORTHOPAEDIC SURGERY

## 2020-02-11 PROCEDURE — 3008F BODY MASS INDEX DOCD: CPT | Performed by: ORTHOPAEDIC SURGERY

## 2020-02-11 PROCEDURE — 99213 OFFICE O/P EST LOW 20 MIN: CPT | Performed by: ORTHOPAEDIC SURGERY

## 2020-02-11 NOTE — PROGRESS NOTES
Assessment:     1  Strain of right shoulder, subsequent encounter    2  Strain of right elbow, subsequent encounter        Plan:     Problem List Items Addressed This Visit        Musculoskeletal and Integument    Strain of right shoulder - Primary    Strain of right elbow          The patient was seen and examined by Dr Shade Viveros and myself  Findings consistent with right shoulder and right elbow strains-resolved  Findings and treatment options were discussed with the patient  Discussed importance of continuing home exercises  He may resume full duty  Follow-up as needed if any problems arise  All patient's questions were answered to their satisfaction  This note is created using dictation transcription  It may contain typographical errors, grammatical errors, improperly dictated words, background noise and other errors  Subjective:     Patient ID: Brandan Gonzalez is a 38 y o  male  Chief Complaint: Right shoulder pain    HPI:  This is a 50-year-old male following up for right shoulder and right elbow strains  He suffered a work related injury to his right shoulder on November 24, 2019  Patient states he 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 and felt several pops in his right shoulder followed by pain  He has recently been discharged from physical therapy  He feels significant improvement  He has no pain with daily activities  He has been tolerating work with restrictions well  He would like to return to full duty      Allergy:  Allergies   Allergen Reactions    Amoxicillin     Azithromycin     Cefaclor     Pollen Extract      Medications:  all current active meds have been reviewed  Past Medical History:  Past Medical History:   Diagnosis Date    Hypertension     Obesity     Stroke [facility])      Past Surgical History:  Past Surgical History:   Procedure Laterality Date    HERNIA REPAIR      INGUINAL HERNIA REPAIR       Family History:  Family History   Problem Relation Age of Onset    Cancer Mother     Hypertension Mother     Thyroid disease Mother     Cancer Father     Diabetes Father     Hypertension Father     Stroke Father     Prostate cancer Other     Substance Abuse Neg Hx         neg fam hx    Mental illness Neg Hx         neg fam hx     Social History:  Social History     Substance and Sexual Activity   Alcohol Use Yes    Frequency: Monthly or less    Drinks per session: 1 or 2    Binge frequency: Never    Comment: occasional     Social History     Substance and Sexual Activity   Drug Use No     Social History     Tobacco Use   Smoking Status Never Smoker   Smokeless Tobacco Never Used     Review of Systems   Constitutional: Negative  HENT: Negative  Eyes: Negative  Respiratory: Negative  Cardiovascular: Negative  Gastrointestinal: Negative  Endocrine: Negative  Genitourinary: Negative  Musculoskeletal: Negative for arthralgias (Right shoulder and upper extremity)  Skin: Negative  Neurological: Negative  Hematological: Negative  Psychiatric/Behavioral: Negative  Objective:  BP Readings from Last 1 Encounters:   02/11/20 122/80      Wt Readings from Last 1 Encounters:   02/11/20 (!) 170 kg (374 lb)      BMI:   Estimated body mass index is 57 71 kg/m² as calculated from the following:    Height as of this encounter: 5' 7 5" (1 715 m)  Weight as of this encounter: 170 kg (374 lb)  BSA:   Estimated body surface area is 2 65 meters squared as calculated from the following:    Height as of this encounter: 5' 7 5" (1 715 m)  Weight as of this encounter: 170 kg (374 lb)  Physical Exam   Constitutional: He is oriented to person, place, and time  He appears well-developed  Morbidly obese   HENT:   Head: Normocephalic and atraumatic  Eyes: Conjunctivae and EOM are normal    Neck: Neck supple  Pulmonary/Chest: Effort normal    Neurological: He is alert and oriented to person, place, and time  Skin: Skin is warm     Psychiatric: He has a normal mood and affect  Nursing note and vitals reviewed  Right Elbow Exam     Tenderness   The patient is experiencing no tenderness  Range of Motion   The patient has normal right elbow ROM  Muscle Strength   Pronation:  5/5   Supination:  5/5     Other   Erythema: absent  Sensation: normal  Pulse: present      Right Shoulder Exam     Tenderness   The patient is experiencing no tenderness  Range of Motion   The patient has normal right shoulder ROM  Active abduction: normal   Forward flexion: normal     Muscle Strength   Abduction: 5/5   Internal rotation: 5/5   External rotation: 5/5   Biceps: 5/5     Tests   Valles test: negative  Cross arm: negative  Impingement: negative  Drop arm: negative    Other   Erythema: absent  Sensation: normal  Pulse: present    Comments:  No pain with resisted strength testing               No new imaging today

## 2020-02-17 DIAGNOSIS — I10 ESSENTIAL HYPERTENSION: ICD-10-CM

## 2020-02-17 DIAGNOSIS — I63.9 CEREBROVASCULAR ACCIDENT (CVA), UNSPECIFIED MECHANISM (HCC): ICD-10-CM

## 2020-02-17 RX ORDER — ATORVASTATIN CALCIUM 40 MG/1
TABLET, FILM COATED ORAL
Qty: 30 TABLET | Refills: 11 | Status: SHIPPED | OUTPATIENT
Start: 2020-02-17 | End: 2021-02-15

## 2020-02-17 RX ORDER — AMLODIPINE BESYLATE 10 MG/1
TABLET ORAL
Qty: 30 TABLET | Refills: 11 | Status: SHIPPED | OUTPATIENT
Start: 2020-02-17 | End: 2021-02-23

## 2020-02-18 LAB — INR PPP: 2.4 (ref 0.84–1.19)

## 2020-02-19 ENCOUNTER — TELEPHONE (OUTPATIENT)
Dept: FAMILY MEDICINE CLINIC | Facility: HOSPITAL | Age: 35
End: 2020-02-19

## 2020-02-19 ENCOUNTER — ANTICOAG VISIT (OUTPATIENT)
Dept: FAMILY MEDICINE CLINIC | Facility: HOSPITAL | Age: 35
End: 2020-02-19

## 2020-02-21 ENCOUNTER — OFFICE VISIT (OUTPATIENT)
Dept: FAMILY MEDICINE CLINIC | Facility: HOSPITAL | Age: 35
End: 2020-02-21
Payer: COMMERCIAL

## 2020-02-21 VITALS
DIASTOLIC BLOOD PRESSURE: 66 MMHG | BODY MASS INDEX: 47.74 KG/M2 | HEIGHT: 68 IN | SYSTOLIC BLOOD PRESSURE: 108 MMHG | TEMPERATURE: 103.5 F | WEIGHT: 315 LBS | HEART RATE: 119 BPM

## 2020-02-21 DIAGNOSIS — J11.1 FLU SYNDROME: Primary | ICD-10-CM

## 2020-02-21 PROCEDURE — 3074F SYST BP LT 130 MM HG: CPT | Performed by: PHYSICIAN ASSISTANT

## 2020-02-21 PROCEDURE — 3078F DIAST BP <80 MM HG: CPT | Performed by: PHYSICIAN ASSISTANT

## 2020-02-21 PROCEDURE — 3008F BODY MASS INDEX DOCD: CPT | Performed by: PHYSICIAN ASSISTANT

## 2020-02-21 PROCEDURE — 1036F TOBACCO NON-USER: CPT | Performed by: PHYSICIAN ASSISTANT

## 2020-02-21 PROCEDURE — 99213 OFFICE O/P EST LOW 20 MIN: CPT | Performed by: PHYSICIAN ASSISTANT

## 2020-02-21 RX ORDER — MONTELUKAST SODIUM 10 MG/1
TABLET ORAL
COMMUNITY
Start: 2020-01-03 | End: 2020-02-21 | Stop reason: SDUPTHER

## 2020-02-21 RX ORDER — OSELTAMIVIR PHOSPHATE 75 MG/1
75 CAPSULE ORAL EVERY 12 HOURS SCHEDULED
Qty: 10 CAPSULE | Refills: 0 | Status: SHIPPED | OUTPATIENT
Start: 2020-02-21 | End: 2020-02-26

## 2020-02-21 NOTE — PROGRESS NOTES
Assessment/Plan:     Diagnoses and all orders for this visit:    Flu syndrome  -     oseltamivir (TAMIFLU) 75 mg capsule; Take 1 capsule (75 mg total) by mouth every 12 (twelve) hours for 5 days    Other orders  -     Discontinue: montelukast (SINGULAIR) 10 mg tablet          Subjective:      Patient ID: Kimberly guerra a 38 y o  male  38year old AA male c/o fever, abd  Pain, N/V, body aches, weakness, and diarrhea  Took Tylenol last night, was forced to work last night  Getting slightly better  Had diarrhea, this morning, has been constant  Last time patient vomited, was yesterday morning, has been laying around  Has severe nausea  Has not eaten since few days ago  Presents with mom  Review of Systems   Constitutional: Negative for chills, diaphoresis, fatigue and fever  Respiratory: Negative for cough, chest tightness and shortness of breath  Gastrointestinal: Positive for abdominal pain, diarrhea, nausea and vomiting  Objective:      /66   Pulse (!) 119   Temp (!) 103 5 °F (39 7 °C) (Tympanic)   Ht 5' 7 5" (1 715 m)   Wt (!) 166 kg (366 lb)   BMI 56 48 kg/m²          Physical Exam   Constitutional: He is oriented to person, place, and time  He appears well-developed and well-nourished  No distress  Appears tired  Obese  HENT:   Head: Normocephalic and atraumatic  Pulmonary/Chest: Effort normal and breath sounds normal  No stridor  No respiratory distress  He has no wheezes  He has no rales  Musculoskeletal: Normal range of motion  He exhibits no edema, tenderness or deformity  Neurological: He is alert and oriented to person, place, and time  No cranial nerve deficit  Coordination normal    Skin: He is not diaphoretic  Nursing note and vitals reviewed

## 2020-02-21 NOTE — PATIENT INSTRUCTIONS
Recommend bland diet, and clear fluids, ex  Gingerale  Start Flumadine twice a day for 5 days; can try Rx  Zofran for nausea  Also can try Imodium over the counter for diarrhea  Try over the counter, Probiotics, ex   Align, etc

## 2020-03-04 ENCOUNTER — ANTICOAG VISIT (OUTPATIENT)
Dept: FAMILY MEDICINE CLINIC | Facility: HOSPITAL | Age: 35
End: 2020-03-04

## 2020-03-04 ENCOUNTER — TELEPHONE (OUTPATIENT)
Dept: FAMILY MEDICINE CLINIC | Facility: HOSPITAL | Age: 35
End: 2020-03-04

## 2020-03-04 LAB — INR PPP: 3.5 (ref 0.84–1.19)

## 2020-03-05 ENCOUNTER — TELEPHONE (OUTPATIENT)
Dept: FAMILY MEDICINE CLINIC | Facility: HOSPITAL | Age: 35
End: 2020-03-05

## 2020-03-05 DIAGNOSIS — T78.40XS ALLERGIC STATE, SEQUELA: ICD-10-CM

## 2020-03-05 NOTE — TELEPHONE ENCOUNTER
Patient had labs done 6/2019 at [facility]  Labs ordered 10/2019 not done, Suzanne Ulloa aware

## 2020-03-06 RX ORDER — MONTELUKAST SODIUM 10 MG/1
10 TABLET ORAL
Qty: 30 TABLET | Refills: 11 | Status: SHIPPED | OUTPATIENT
Start: 2020-03-06 | End: 2021-12-06

## 2020-03-11 ENCOUNTER — ANTICOAG VISIT (OUTPATIENT)
Dept: FAMILY MEDICINE CLINIC | Facility: HOSPITAL | Age: 35
End: 2020-03-11

## 2020-03-11 ENCOUNTER — TELEPHONE (OUTPATIENT)
Dept: FAMILY MEDICINE CLINIC | Facility: HOSPITAL | Age: 35
End: 2020-03-11

## 2020-03-18 ENCOUNTER — ANTICOAG VISIT (OUTPATIENT)
Dept: FAMILY MEDICINE CLINIC | Facility: HOSPITAL | Age: 35
End: 2020-03-18

## 2020-03-18 ENCOUNTER — TELEPHONE (OUTPATIENT)
Dept: FAMILY MEDICINE CLINIC | Facility: HOSPITAL | Age: 35
End: 2020-03-18

## 2020-03-18 LAB — INR PPP: 2.1 (ref 0.84–1.19)

## 2020-03-24 ENCOUNTER — TELEPHONE (OUTPATIENT)
Dept: FAMILY MEDICINE CLINIC | Facility: HOSPITAL | Age: 35
End: 2020-03-24

## 2020-03-24 ENCOUNTER — ANTICOAG VISIT (OUTPATIENT)
Dept: FAMILY MEDICINE CLINIC | Facility: HOSPITAL | Age: 35
End: 2020-03-24

## 2020-03-24 LAB — INR PPP: 2.7 (ref 0.84–1.19)

## 2020-03-25 ENCOUNTER — TELEMEDICINE (OUTPATIENT)
Dept: FAMILY MEDICINE CLINIC | Facility: HOSPITAL | Age: 35
End: 2020-03-25
Payer: COMMERCIAL

## 2020-03-25 DIAGNOSIS — J01.00 ACUTE NON-RECURRENT MAXILLARY SINUSITIS: Primary | ICD-10-CM

## 2020-03-25 PROCEDURE — 99442 PR PHYS/QHP TELEPHONE EVALUATION 11-20 MIN: CPT | Performed by: INTERNAL MEDICINE

## 2020-03-25 RX ORDER — DOXYCYCLINE HYCLATE 100 MG/1
100 CAPSULE ORAL EVERY 12 HOURS SCHEDULED
Qty: 14 CAPSULE | Refills: 0 | Status: SHIPPED | OUTPATIENT
Start: 2020-03-25 | End: 2020-04-01

## 2020-03-25 NOTE — PROGRESS NOTES
COVID-19 Virtual Visit     This virtual check-in was done via telephone  Encounter provider Ramy Contreras DO    Provider located at [place] [address] [address] [place] [address]    Recent Visits  Date Type Provider Dept   03/24/20 Telephone [address] [facility]   03/18/20 Telephone Ramy Contreras DO  [address] [facility]   Showing recent visits within past 7 days and meeting all other requirements     Future Appointments  No visits were found meeting these conditions  Showing future appointments within next 150 days and meeting all other requirements        Patient agrees to participate in a virtual check in via telephone or video visit instead of presenting to the office to address urgent/immediate medical needs  Patient is aware this is a billable service  After connecting through telephone, the patient was identified by name and date of birth  Artis Cisneros was informed that this was a telemedicine visit and that the exam was being conducted confidentially over secure lines  My office door was closed  No one else was in the room  Artis Cisneros acknowledged consent and understanding of privacy and security of the telemedicine visit  I informed the patient that I have reviewed his record in Epic and presented the opportunity for him to ask any questions regarding the visit today  The patient agreed to participate  Artis Cisneros is a 38 y o  male who is concerned about cough and sinus congestion    He reports cough  He has not traveled outside the [place]  within the last 14 days    He has had contact with a person who is under investigation for or who is positive for COVID-19 within the last 14 days  He has been hospitalized recently for fever and/or lower respiratory symptoms      Past Medical History:   Diagnosis Date    Hypertension     Obesity     Stroke [facility])        Past Surgical History:   Procedure Laterality Date    HERNIA REPAIR      INGUINAL HERNIA REPAIR         Current Outpatient Medications   Medication Sig Dispense Refill    acetaminophen-codeine (TYLENOL/CODEINE #3) 300-30 MG per tablet Take 1 tablet by mouth every 4 (four) hours as needed for moderate pain 30 tablet 0    albuterol (PROAIR HFA) 90 mcg/act inhaler Inhale 2 puffs every 6 (six) hours as needed for wheezing 1 each 2    amLODIPine (NORVASC) 10 mg tablet TAKE 1 TABLET BY MOUTH EVERY DAY 30 tablet 11    atorvastatin (LIPITOR) 40 mg tablet TAKE 1 TABLET BY MOUTH EVERY DAY 30 tablet 11    cyclobenzaprine (FLEXERIL) 10 mg tablet Take 1 tablet (10 mg total) by mouth daily at bedtime as needed for muscle spasms 30 tablet 0    fexofenadine (ALLEGRA) 180 MG tablet Take 180 mg by mouth daily      fluticasone (FLONASE) 50 mcg/act nasal spray 1 spray into each nostril daily 16 g 0    guaiFENesin (MUCINEX) 600 mg 12 hr tablet Take 1,200 mg by mouth every 12 (twelve) hours      lisinopril-hydrochlorothiazide (PRINZIDE,ZESTORETIC) 20-12 5 MG per tablet TAKE 1 TABLET BY MOUTH EVERY DAY 90 tablet 3    montelukast (SINGULAIR) 10 mg tablet Take 1 tablet (10 mg total) by mouth daily at bedtime 30 tablet 11    spironolactone (ALDACTONE) 25 mg tablet Take 1 tablet (25 mg total) by mouth daily 90 tablet 3    warfarin (COUMADIN) 5 mg tablet Take 2 tablets (10 mg total) by mouth daily As directed 180 tablet 3     No current facility-administered medications for this visit  Allergies   Allergen Reactions    Amoxicillin     Azithromycin     Cefaclor     Pollen Extract      Having green discolored drainagefrom nose starting yesterday and now has increased sinus pressure is noted in the maxillary areas bilaterally    No sick contacts    Does admit to some head aches  But no shaking chills or fever           Disposition:       Will start on antibiotics he does have allergies to cephalosporins penicillin and azithromycin which is mother states cause swelling  Will place patient on doxycycline twice daily for 7 days  Patient was instructed to call if he was not improving  Patient is on Coumadin and does have a self-monitoring keep ability at home  I have asked him to recheck his INR in 4-5 days while on the antibiotics to make sure that there is not any significant change  I spent 14  minutes with the patient during this virtual check-in visit

## 2020-03-29 LAB — INR PPP: 2.4 (ref 0.84–1.19)

## 2020-03-30 ENCOUNTER — ANTICOAG VISIT (OUTPATIENT)
Dept: FAMILY MEDICINE CLINIC | Facility: HOSPITAL | Age: 35
End: 2020-03-30

## 2020-03-30 ENCOUNTER — TELEPHONE (OUTPATIENT)
Dept: FAMILY MEDICINE CLINIC | Facility: HOSPITAL | Age: 35
End: 2020-03-30

## 2020-04-10 ENCOUNTER — TELEPHONE (OUTPATIENT)
Dept: FAMILY MEDICINE CLINIC | Facility: HOSPITAL | Age: 35
End: 2020-04-10

## 2020-04-12 LAB — INR PPP: 2.5 (ref 0.84–1.19)

## 2020-04-13 ENCOUNTER — TELEPHONE (OUTPATIENT)
Dept: FAMILY MEDICINE CLINIC | Facility: HOSPITAL | Age: 35
End: 2020-04-13

## 2020-04-13 ENCOUNTER — ANTICOAG VISIT (OUTPATIENT)
Dept: FAMILY MEDICINE CLINIC | Facility: HOSPITAL | Age: 35
End: 2020-04-13

## 2020-04-28 LAB — INR PPP: 2.8 (ref 0.84–1.19)

## 2020-04-29 ENCOUNTER — TELEPHONE (OUTPATIENT)
Dept: FAMILY MEDICINE CLINIC | Facility: HOSPITAL | Age: 35
End: 2020-04-29

## 2020-05-01 ENCOUNTER — ANTICOAG VISIT (OUTPATIENT)
Dept: FAMILY MEDICINE CLINIC | Facility: HOSPITAL | Age: 35
End: 2020-05-01

## 2020-05-12 LAB — INR PPP: 2.5 (ref 0.84–1.19)

## 2020-05-13 ENCOUNTER — ANTICOAG VISIT (OUTPATIENT)
Dept: FAMILY MEDICINE CLINIC | Facility: HOSPITAL | Age: 35
End: 2020-05-13

## 2020-05-26 LAB — INR PPP: 2.4 (ref 0.84–1.19)

## 2020-05-27 ENCOUNTER — ANTICOAG VISIT (OUTPATIENT)
Dept: FAMILY MEDICINE CLINIC | Facility: HOSPITAL | Age: 35
End: 2020-05-27

## 2020-06-09 LAB — INR PPP: 2.2 (ref 0.84–1.19)

## 2020-06-10 ENCOUNTER — TELEPHONE (OUTPATIENT)
Dept: FAMILY MEDICINE CLINIC | Facility: HOSPITAL | Age: 35
End: 2020-06-10

## 2020-06-11 ENCOUNTER — ANTICOAG VISIT (OUTPATIENT)
Dept: FAMILY MEDICINE CLINIC | Facility: HOSPITAL | Age: 35
End: 2020-06-11

## 2020-06-15 DIAGNOSIS — I10 ESSENTIAL HYPERTENSION: ICD-10-CM

## 2020-06-15 RX ORDER — SPIRONOLACTONE 25 MG/1
TABLET ORAL
Qty: 30 TABLET | Refills: 11 | Status: SHIPPED | OUTPATIENT
Start: 2020-06-15 | End: 2021-06-02

## 2020-06-18 DIAGNOSIS — I63.9 CEREBROVASCULAR ACCIDENT (CVA), UNSPECIFIED MECHANISM (HCC): ICD-10-CM

## 2020-06-18 DIAGNOSIS — I10 HYPERTENSION, UNSPECIFIED TYPE: ICD-10-CM

## 2020-06-18 RX ORDER — LISINOPRIL AND HYDROCHLOROTHIAZIDE 20; 12.5 MG/1; MG/1
1 TABLET ORAL DAILY
Qty: 90 TABLET | Refills: 3 | Status: SHIPPED | OUTPATIENT
Start: 2020-06-18 | End: 2021-06-25 | Stop reason: SDUPTHER

## 2020-06-18 RX ORDER — WARFARIN SODIUM 5 MG/1
10 TABLET ORAL DAILY
Qty: 180 TABLET | Refills: 3 | Status: SHIPPED | OUTPATIENT
Start: 2020-06-18 | End: 2021-06-03 | Stop reason: SDUPTHER

## 2020-06-24 ENCOUNTER — ANTICOAG VISIT (OUTPATIENT)
Dept: FAMILY MEDICINE CLINIC | Facility: HOSPITAL | Age: 35
End: 2020-06-24

## 2020-06-24 ENCOUNTER — OCCMED (OUTPATIENT)
Dept: URGENT CARE | Facility: CLINIC | Age: 35
End: 2020-06-24
Payer: OTHER MISCELLANEOUS

## 2020-06-24 ENCOUNTER — APPOINTMENT (OUTPATIENT)
Dept: RADIOLOGY | Facility: CLINIC | Age: 35
End: 2020-06-24
Payer: OTHER MISCELLANEOUS

## 2020-06-24 ENCOUNTER — TELEPHONE (OUTPATIENT)
Dept: FAMILY MEDICINE CLINIC | Facility: HOSPITAL | Age: 35
End: 2020-06-24

## 2020-06-24 DIAGNOSIS — M25.511 ACUTE PAIN OF RIGHT SHOULDER: Primary | ICD-10-CM

## 2020-06-24 DIAGNOSIS — M25.511 ACUTE PAIN OF RIGHT SHOULDER: ICD-10-CM

## 2020-06-24 LAB — INR PPP: 1.6 (ref 0.84–1.19)

## 2020-06-24 PROCEDURE — 73030 X-RAY EXAM OF SHOULDER: CPT

## 2020-06-24 PROCEDURE — G0382 LEV 3 HOSP TYPE B ED VISIT: HCPCS | Performed by: PHYSICIAN ASSISTANT

## 2020-06-24 PROCEDURE — 99283 EMERGENCY DEPT VISIT LOW MDM: CPT | Performed by: PHYSICIAN ASSISTANT

## 2020-06-29 ENCOUNTER — APPOINTMENT (OUTPATIENT)
Dept: URGENT CARE | Facility: CLINIC | Age: 35
End: 2020-06-29
Payer: OTHER MISCELLANEOUS

## 2020-06-29 PROCEDURE — 99213 OFFICE O/P EST LOW 20 MIN: CPT | Performed by: PHYSICIAN ASSISTANT

## 2020-06-30 LAB — INR PPP: 1.9 (ref 0.84–1.19)

## 2020-07-01 ENCOUNTER — TELEPHONE (OUTPATIENT)
Dept: FAMILY MEDICINE CLINIC | Facility: HOSPITAL | Age: 35
End: 2020-07-01

## 2020-07-01 ENCOUNTER — ANTICOAG VISIT (OUTPATIENT)
Dept: FAMILY MEDICINE CLINIC | Facility: HOSPITAL | Age: 35
End: 2020-07-01

## 2020-07-07 ENCOUNTER — APPOINTMENT (OUTPATIENT)
Dept: URGENT CARE | Facility: CLINIC | Age: 35
End: 2020-07-07
Payer: OTHER MISCELLANEOUS

## 2020-07-07 PROCEDURE — 99213 OFFICE O/P EST LOW 20 MIN: CPT | Performed by: PREVENTIVE MEDICINE

## 2020-07-08 ENCOUNTER — ANTICOAG VISIT (OUTPATIENT)
Dept: FAMILY MEDICINE CLINIC | Facility: HOSPITAL | Age: 35
End: 2020-07-08

## 2020-07-08 ENCOUNTER — TELEPHONE (OUTPATIENT)
Dept: FAMILY MEDICINE CLINIC | Facility: HOSPITAL | Age: 35
End: 2020-07-08

## 2020-07-08 LAB — INR PPP: 2.2 (ref 0.84–1.19)

## 2020-07-15 ENCOUNTER — APPOINTMENT (OUTPATIENT)
Dept: URGENT CARE | Facility: CLINIC | Age: 35
End: 2020-07-15
Payer: OTHER MISCELLANEOUS

## 2020-07-15 PROCEDURE — 99213 OFFICE O/P EST LOW 20 MIN: CPT

## 2020-07-21 ENCOUNTER — APPOINTMENT (OUTPATIENT)
Dept: URGENT CARE | Facility: CLINIC | Age: 35
End: 2020-07-21
Payer: OTHER MISCELLANEOUS

## 2020-07-21 PROCEDURE — 99213 OFFICE O/P EST LOW 20 MIN: CPT | Performed by: FAMILY MEDICINE

## 2020-07-22 ENCOUNTER — ANTICOAG VISIT (OUTPATIENT)
Dept: FAMILY MEDICINE CLINIC | Facility: HOSPITAL | Age: 35
End: 2020-07-22

## 2020-07-22 ENCOUNTER — TELEPHONE (OUTPATIENT)
Dept: FAMILY MEDICINE CLINIC | Facility: HOSPITAL | Age: 35
End: 2020-07-22

## 2020-07-22 LAB — INR PPP: 1.9 (ref 0.84–1.19)

## 2020-08-03 ENCOUNTER — APPOINTMENT (OUTPATIENT)
Dept: URGENT CARE | Facility: CLINIC | Age: 35
End: 2020-08-03
Payer: OTHER MISCELLANEOUS

## 2020-08-03 PROCEDURE — 99213 OFFICE O/P EST LOW 20 MIN: CPT | Performed by: FAMILY MEDICINE

## 2020-08-05 ENCOUNTER — TELEPHONE (OUTPATIENT)
Dept: FAMILY MEDICINE CLINIC | Facility: HOSPITAL | Age: 35
End: 2020-08-05

## 2020-08-05 LAB — INR PPP: 2.3 (ref 0.84–1.19)

## 2020-08-06 ENCOUNTER — ANTICOAG VISIT (OUTPATIENT)
Dept: FAMILY MEDICINE CLINIC | Facility: HOSPITAL | Age: 35
End: 2020-08-06

## 2020-08-10 ENCOUNTER — APPOINTMENT (OUTPATIENT)
Dept: URGENT CARE | Facility: CLINIC | Age: 35
End: 2020-08-10
Payer: OTHER MISCELLANEOUS

## 2020-08-10 PROCEDURE — 99213 OFFICE O/P EST LOW 20 MIN: CPT | Performed by: FAMILY MEDICINE

## 2020-08-19 ENCOUNTER — ANTICOAG VISIT (OUTPATIENT)
Dept: FAMILY MEDICINE CLINIC | Facility: HOSPITAL | Age: 35
End: 2020-08-19

## 2020-08-19 DIAGNOSIS — J45.909 UNCOMPLICATED ASTHMA, UNSPECIFIED ASTHMA SEVERITY, UNSPECIFIED WHETHER PERSISTENT: ICD-10-CM

## 2020-08-19 DIAGNOSIS — L30.9 DERMATITIS: ICD-10-CM

## 2020-08-19 LAB — INR PPP: 1.5 (ref 0.84–1.19)

## 2020-08-19 RX ORDER — ALBUTEROL SULFATE 90 UG/1
2 AEROSOL, METERED RESPIRATORY (INHALATION) EVERY 6 HOURS PRN
Qty: 1 EACH | Refills: 2 | Status: SHIPPED | OUTPATIENT
Start: 2020-08-19

## 2020-08-19 NOTE — TELEPHONE ENCOUNTER
LDM on Katlin's Vm to increase coumadin dose to 10 mg  W/Sat, and then 7 5 mg all other days INR in 1 week DD

## 2020-08-24 ENCOUNTER — APPOINTMENT (OUTPATIENT)
Dept: URGENT CARE | Facility: CLINIC | Age: 35
End: 2020-08-24
Payer: OTHER MISCELLANEOUS

## 2020-08-24 PROCEDURE — 99213 OFFICE O/P EST LOW 20 MIN: CPT | Performed by: FAMILY MEDICINE

## 2020-08-25 LAB — INR PPP: 2 (ref 0.84–1.19)

## 2020-08-26 ENCOUNTER — TELEPHONE (OUTPATIENT)
Dept: FAMILY MEDICINE CLINIC | Facility: HOSPITAL | Age: 35
End: 2020-08-26

## 2020-08-26 ENCOUNTER — ANTICOAG VISIT (OUTPATIENT)
Dept: FAMILY MEDICINE CLINIC | Facility: HOSPITAL | Age: 35
End: 2020-08-26

## 2020-09-01 LAB — INR PPP: 2.8 (ref 0.84–1.19)

## 2020-09-02 ENCOUNTER — ANTICOAG VISIT (OUTPATIENT)
Dept: FAMILY MEDICINE CLINIC | Facility: HOSPITAL | Age: 35
End: 2020-09-02

## 2020-09-02 ENCOUNTER — TELEPHONE (OUTPATIENT)
Dept: FAMILY MEDICINE CLINIC | Facility: HOSPITAL | Age: 35
End: 2020-09-02

## 2020-09-02 NOTE — TELEPHONE ENCOUNTER
Spoke to mother  Instructions reiterated with her for her sons coum  It was very hard to hear her talk on her car phone    dk

## 2020-09-11 ENCOUNTER — TELEPHONE (OUTPATIENT)
Dept: OTHER | Facility: OTHER | Age: 35
End: 2020-09-11

## 2020-09-11 ENCOUNTER — TELEPHONE (OUTPATIENT)
Dept: FAMILY MEDICINE CLINIC | Facility: HOSPITAL | Age: 35
End: 2020-09-11

## 2020-09-11 DIAGNOSIS — B36.9 FUNGAL DERMATITIS: Primary | ICD-10-CM

## 2020-09-11 RX ORDER — CLOTRIMAZOLE AND BETAMETHASONE DIPROPIONATE 10; .64 MG/G; MG/G
CREAM TOPICAL 2 TIMES DAILY
Qty: 30 G | Refills: 0 | Status: SHIPPED | OUTPATIENT
Start: 2020-09-11 | End: 2021-10-13 | Stop reason: ALTCHOICE

## 2020-09-15 LAB — INR PPP: 2.6 (ref 0.84–1.19)

## 2020-09-16 ENCOUNTER — TELEPHONE (OUTPATIENT)
Dept: FAMILY MEDICINE CLINIC | Facility: HOSPITAL | Age: 35
End: 2020-09-16

## 2020-09-16 ENCOUNTER — ANTICOAG VISIT (OUTPATIENT)
Dept: FAMILY MEDICINE CLINIC | Facility: HOSPITAL | Age: 35
End: 2020-09-16

## 2020-09-30 ENCOUNTER — ANTICOAG VISIT (OUTPATIENT)
Dept: FAMILY MEDICINE CLINIC | Facility: HOSPITAL | Age: 35
End: 2020-09-30

## 2020-09-30 ENCOUNTER — TELEPHONE (OUTPATIENT)
Dept: FAMILY MEDICINE CLINIC | Facility: HOSPITAL | Age: 35
End: 2020-09-30

## 2020-09-30 LAB — INR PPP: 2.4 (ref 0.84–1.19)

## 2020-10-14 ENCOUNTER — TELEPHONE (OUTPATIENT)
Dept: FAMILY MEDICINE CLINIC | Facility: HOSPITAL | Age: 35
End: 2020-10-14

## 2020-10-14 LAB — INR PPP: 1.8 (ref 0.84–1.19)

## 2020-10-15 ENCOUNTER — ANTICOAG VISIT (OUTPATIENT)
Dept: FAMILY MEDICINE CLINIC | Facility: HOSPITAL | Age: 35
End: 2020-10-15

## 2020-10-20 LAB — INR PPP: 2 (ref 0.84–1.19)

## 2020-10-21 ENCOUNTER — TELEPHONE (OUTPATIENT)
Dept: FAMILY MEDICINE CLINIC | Facility: HOSPITAL | Age: 35
End: 2020-10-21

## 2020-10-21 ENCOUNTER — ANTICOAG VISIT (OUTPATIENT)
Dept: FAMILY MEDICINE CLINIC | Facility: HOSPITAL | Age: 35
End: 2020-10-21

## 2020-11-05 LAB — INR PPP: 2.4 (ref 0.84–1.19)

## 2020-11-06 ENCOUNTER — ANTICOAG VISIT (OUTPATIENT)
Dept: FAMILY MEDICINE CLINIC | Facility: HOSPITAL | Age: 35
End: 2020-11-06

## 2020-11-06 ENCOUNTER — NURSE TRIAGE (OUTPATIENT)
Dept: OTHER | Facility: OTHER | Age: 35
End: 2020-11-06

## 2020-11-06 ENCOUNTER — TELEPHONE (OUTPATIENT)
Dept: FAMILY MEDICINE CLINIC | Facility: HOSPITAL | Age: 35
End: 2020-11-06

## 2020-12-02 ENCOUNTER — ANTICOAG VISIT (OUTPATIENT)
Dept: FAMILY MEDICINE CLINIC | Facility: HOSPITAL | Age: 35
End: 2020-12-02

## 2020-12-02 ENCOUNTER — TELEPHONE (OUTPATIENT)
Dept: OTHER | Facility: OTHER | Age: 35
End: 2020-12-02

## 2020-12-02 LAB — INR PPP: 2.4 (ref 0.84–1.19)

## 2020-12-16 ENCOUNTER — ANTICOAG VISIT (OUTPATIENT)
Dept: FAMILY MEDICINE CLINIC | Facility: HOSPITAL | Age: 35
End: 2020-12-16

## 2020-12-16 LAB — INR PPP: 1.8 (ref 0.84–1.19)

## 2020-12-23 ENCOUNTER — TELEPHONE (OUTPATIENT)
Dept: FAMILY MEDICINE CLINIC | Facility: HOSPITAL | Age: 35
End: 2020-12-23

## 2020-12-23 DIAGNOSIS — K04.7 DENTAL ABSCESS: Primary | ICD-10-CM

## 2020-12-23 RX ORDER — CLINDAMYCIN HYDROCHLORIDE 300 MG/1
300 CAPSULE ORAL 4 TIMES DAILY
Qty: 28 CAPSULE | Refills: 0 | Status: SHIPPED | OUTPATIENT
Start: 2020-12-23 | End: 2020-12-30

## 2020-12-28 ENCOUNTER — TELEPHONE (OUTPATIENT)
Dept: FAMILY MEDICINE CLINIC | Facility: HOSPITAL | Age: 35
End: 2020-12-28

## 2020-12-30 ENCOUNTER — TELEPHONE (OUTPATIENT)
Dept: FAMILY MEDICINE CLINIC | Facility: HOSPITAL | Age: 35
End: 2020-12-30

## 2020-12-30 ENCOUNTER — ANTICOAG VISIT (OUTPATIENT)
Dept: FAMILY MEDICINE CLINIC | Facility: HOSPITAL | Age: 35
End: 2020-12-30

## 2020-12-30 LAB — INR PPP: 2 (ref 0.84–1.19)

## 2021-01-12 LAB — INR PPP: 2 (ref 0.84–1.19)

## 2021-01-13 ENCOUNTER — ANTICOAG VISIT (OUTPATIENT)
Dept: FAMILY MEDICINE CLINIC | Facility: HOSPITAL | Age: 36
End: 2021-01-13

## 2021-01-27 ENCOUNTER — ANTICOAG VISIT (OUTPATIENT)
Dept: FAMILY MEDICINE CLINIC | Facility: HOSPITAL | Age: 36
End: 2021-01-27

## 2021-01-27 LAB — INR PPP: 2 (ref 0.84–1.19)

## 2021-02-12 ENCOUNTER — ANTICOAG VISIT (OUTPATIENT)
Dept: FAMILY MEDICINE CLINIC | Facility: HOSPITAL | Age: 36
End: 2021-02-12

## 2021-02-12 LAB — INR PPP: 2.8 (ref 0.84–1.19)

## 2021-02-13 DIAGNOSIS — I63.9 CEREBROVASCULAR ACCIDENT (CVA), UNSPECIFIED MECHANISM (HCC): ICD-10-CM

## 2021-02-15 RX ORDER — ATORVASTATIN CALCIUM 40 MG/1
TABLET, FILM COATED ORAL
Qty: 30 TABLET | Refills: 11 | Status: SHIPPED | OUTPATIENT
Start: 2021-02-15 | End: 2022-01-21 | Stop reason: ALTCHOICE

## 2021-02-23 DIAGNOSIS — I10 ESSENTIAL HYPERTENSION: ICD-10-CM

## 2021-02-23 RX ORDER — AMLODIPINE BESYLATE 10 MG/1
TABLET ORAL
Qty: 30 TABLET | Refills: 11 | Status: SHIPPED | OUTPATIENT
Start: 2021-02-23 | End: 2022-03-15

## 2021-02-24 ENCOUNTER — ANTICOAG VISIT (OUTPATIENT)
Dept: FAMILY MEDICINE CLINIC | Facility: HOSPITAL | Age: 36
End: 2021-02-24

## 2021-02-24 LAB — INR PPP: 2.9 (ref 0.84–1.19)

## 2021-03-13 ENCOUNTER — NURSE TRIAGE (OUTPATIENT)
Dept: OTHER | Facility: OTHER | Age: 36
End: 2021-03-13

## 2021-03-13 LAB — INR PPP: 1.9 (ref 0.84–1.19)

## 2021-03-13 NOTE — TELEPHONE ENCOUNTER
Patient calling in because his INR was 1 9  Says he checks INR at home  Would like to know dosage for Coumadin  Spoke with Althea Mata who recommended pt continue with his 10 mg dosage and repeat INR in 2 weeks  Discussed with pt who verbalized understanding

## 2021-03-13 NOTE — TELEPHONE ENCOUNTER
Regarding: INR results  ----- Message from Chaz Mcnamara RN sent at 3/13/2021  4:39 PM EST -----  "My INR is 1 9 and I need to know what to adjust my medication to   The lab never called my doctor so I haven't adjusted my dose"

## 2021-03-15 ENCOUNTER — ANTICOAG VISIT (OUTPATIENT)
Dept: FAMILY MEDICINE CLINIC | Facility: HOSPITAL | Age: 36
End: 2021-03-15

## 2021-03-24 ENCOUNTER — TELEPHONE (OUTPATIENT)
Dept: FAMILY MEDICINE CLINIC | Facility: HOSPITAL | Age: 36
End: 2021-03-24

## 2021-03-24 ENCOUNTER — ANTICOAG VISIT (OUTPATIENT)
Dept: FAMILY MEDICINE CLINIC | Facility: HOSPITAL | Age: 36
End: 2021-03-24

## 2021-03-24 LAB — INR PPP: 2.3 (ref 0.84–1.19)

## 2021-04-08 ENCOUNTER — ANTICOAG VISIT (OUTPATIENT)
Dept: FAMILY MEDICINE CLINIC | Facility: HOSPITAL | Age: 36
End: 2021-04-08

## 2021-04-08 ENCOUNTER — TELEPHONE (OUTPATIENT)
Dept: FAMILY MEDICINE CLINIC | Facility: HOSPITAL | Age: 36
End: 2021-04-08

## 2021-04-08 LAB — INR PPP: 2.1 (ref 0.84–1.19)

## 2021-04-28 ENCOUNTER — ANTICOAG VISIT (OUTPATIENT)
Dept: FAMILY MEDICINE CLINIC | Facility: HOSPITAL | Age: 36
End: 2021-04-28

## 2021-04-28 LAB — INR PPP: 2.1 (ref 0.84–1.19)

## 2021-05-19 ENCOUNTER — TELEPHONE (OUTPATIENT)
Dept: FAMILY MEDICINE CLINIC | Facility: HOSPITAL | Age: 36
End: 2021-05-19

## 2021-05-19 ENCOUNTER — ANTICOAG VISIT (OUTPATIENT)
Dept: FAMILY MEDICINE CLINIC | Facility: HOSPITAL | Age: 36
End: 2021-05-19

## 2021-05-19 LAB — INR PPP: 2.1 (ref 0.84–1.19)

## 2021-05-19 NOTE — TELEPHONE ENCOUNTER
PT goal is 2 0-3 0 he is taking 7 5 mg Sun/Tue/Thur, and 10   Mg rest of days  His INR was 2 1  Please advise DD

## 2021-06-01 ENCOUNTER — TELEPHONE (OUTPATIENT)
Dept: FAMILY MEDICINE CLINIC | Facility: HOSPITAL | Age: 36
End: 2021-06-01

## 2021-06-01 NOTE — TELEPHONE ENCOUNTER
Pts Mom states she heard about [employer] 3 is an all in one pre, pro and post biotic  Formula for optimal gut health  Would it be ok for pt to take with his medications    Neida Stewart can be reached at [phone]

## 2021-06-02 ENCOUNTER — ANTICOAG VISIT (OUTPATIENT)
Dept: FAMILY MEDICINE CLINIC | Facility: HOSPITAL | Age: 36
End: 2021-06-02

## 2021-06-02 DIAGNOSIS — I10 ESSENTIAL HYPERTENSION: ICD-10-CM

## 2021-06-02 LAB — INR PPP: 1.7 (ref 0.84–1.19)

## 2021-06-02 RX ORDER — SPIRONOLACTONE 25 MG/1
TABLET ORAL
Qty: 30 TABLET | Refills: 11 | Status: SHIPPED | OUTPATIENT
Start: 2021-06-02 | End: 2022-08-02

## 2021-06-03 DIAGNOSIS — I63.9 CEREBROVASCULAR ACCIDENT (CVA), UNSPECIFIED MECHANISM (HCC): ICD-10-CM

## 2021-06-03 RX ORDER — WARFARIN SODIUM 5 MG/1
10 TABLET ORAL DAILY
Qty: 180 TABLET | Refills: 3 | Status: SHIPPED | OUTPATIENT
Start: 2021-06-03 | End: 2021-07-16 | Stop reason: SDUPTHER

## 2021-06-15 LAB — INR PPP: 3.2 (ref 0.84–1.19)

## 2021-06-16 ENCOUNTER — ANTICOAG VISIT (OUTPATIENT)
Dept: FAMILY MEDICINE CLINIC | Facility: HOSPITAL | Age: 36
End: 2021-06-16

## 2021-06-25 DIAGNOSIS — I10 HYPERTENSION, UNSPECIFIED TYPE: ICD-10-CM

## 2021-06-25 RX ORDER — LISINOPRIL AND HYDROCHLOROTHIAZIDE 20; 12.5 MG/1; MG/1
1 TABLET ORAL DAILY
Qty: 90 TABLET | Refills: 3 | Status: SHIPPED | OUTPATIENT
Start: 2021-06-25 | End: 2022-07-09

## 2021-06-29 LAB — INR PPP: 2.5 (ref 0.84–1.19)

## 2021-06-30 ENCOUNTER — ANTICOAG VISIT (OUTPATIENT)
Dept: FAMILY MEDICINE CLINIC | Facility: HOSPITAL | Age: 36
End: 2021-06-30

## 2021-07-15 LAB — INR PPP: 1.3 (ref 0.84–1.19)

## 2021-07-16 ENCOUNTER — ANTICOAG VISIT (OUTPATIENT)
Dept: FAMILY MEDICINE CLINIC | Facility: HOSPITAL | Age: 36
End: 2021-07-16

## 2021-07-16 DIAGNOSIS — I63.9 CEREBROVASCULAR ACCIDENT (CVA), UNSPECIFIED MECHANISM (HCC): ICD-10-CM

## 2021-07-16 RX ORDER — WARFARIN SODIUM 5 MG/1
10 TABLET ORAL DAILY
Qty: 180 TABLET | Refills: 3 | Status: SHIPPED | OUTPATIENT
Start: 2021-07-16

## 2021-07-21 LAB — INR PPP: 1.4 (ref 0.84–1.19)

## 2021-07-22 ENCOUNTER — ANTICOAG VISIT (OUTPATIENT)
Dept: FAMILY MEDICINE CLINIC | Facility: HOSPITAL | Age: 36
End: 2021-07-22

## 2021-07-22 ENCOUNTER — TELEPHONE (OUTPATIENT)
Dept: FAMILY MEDICINE CLINIC | Facility: HOSPITAL | Age: 36
End: 2021-07-22

## 2021-07-22 NOTE — TELEPHONE ENCOUNTER
[address] [place] [address] [address] [facility] - PT/INR result -   Critical value 1 4 taking yesterday

## 2021-07-27 LAB — INR PPP: 2.2 (ref 0.84–1.19)

## 2021-07-28 ENCOUNTER — ANTICOAG VISIT (OUTPATIENT)
Dept: FAMILY MEDICINE CLINIC | Facility: HOSPITAL | Age: 36
End: 2021-07-28

## 2021-08-03 LAB — INR PPP: 2.5 (ref 0.84–1.19)

## 2021-08-05 ENCOUNTER — TELEPHONE (OUTPATIENT)
Dept: FAMILY MEDICINE CLINIC | Facility: HOSPITAL | Age: 36
End: 2021-08-05

## 2021-08-05 ENCOUNTER — ANTICOAG VISIT (OUTPATIENT)
Dept: FAMILY MEDICINE CLINIC | Facility: HOSPITAL | Age: 36
End: 2021-08-05

## 2021-08-05 NOTE — TELEPHONE ENCOUNTER
He called to let Dr Tyrone Godoy know his PT/INR on Tuesday was 2 5  He said we should leave a message for him because he is at work and won't be able to answer his phone

## 2021-08-17 LAB — INR PPP: 2.5 (ref 0.84–1.19)

## 2021-08-18 ENCOUNTER — ANTICOAG VISIT (OUTPATIENT)
Dept: FAMILY MEDICINE CLINIC | Facility: HOSPITAL | Age: 36
End: 2021-08-18

## 2021-08-31 LAB — INR PPP: 2.8 (ref 0.84–1.19)

## 2021-09-01 ENCOUNTER — ANTICOAG VISIT (OUTPATIENT)
Dept: FAMILY MEDICINE CLINIC | Facility: HOSPITAL | Age: 36
End: 2021-09-01

## 2021-09-07 LAB — INR PPP: 4.2 (ref 0.84–1.19)

## 2021-09-08 ENCOUNTER — TELEPHONE (OUTPATIENT)
Dept: FAMILY MEDICINE CLINIC | Facility: HOSPITAL | Age: 36
End: 2021-09-08

## 2021-09-09 ENCOUNTER — ANTICOAG VISIT (OUTPATIENT)
Dept: FAMILY MEDICINE CLINIC | Facility: HOSPITAL | Age: 36
End: 2021-09-09

## 2021-09-14 LAB — INR PPP: 1.6 (ref 0.84–1.19)

## 2021-09-15 ENCOUNTER — ANTICOAG VISIT (OUTPATIENT)
Dept: FAMILY MEDICINE CLINIC | Facility: HOSPITAL | Age: 36
End: 2021-09-15

## 2021-09-24 ENCOUNTER — TELEPHONE (OUTPATIENT)
Dept: FAMILY MEDICINE CLINIC | Facility: HOSPITAL | Age: 36
End: 2021-09-24

## 2021-09-24 ENCOUNTER — ANTICOAG VISIT (OUTPATIENT)
Dept: FAMILY MEDICINE CLINIC | Facility: HOSPITAL | Age: 36
End: 2021-09-24

## 2021-09-24 LAB
INR PPP: 2.2 (ref 0.84–1.19)
INR PPP: 2.2 (ref 0.84–1.19)

## 2021-09-24 NOTE — TELEPHONE ENCOUNTER
We never received result for [employer] I explained that he will need to contact them to forward result to us DD

## 2021-09-24 NOTE — TELEPHONE ENCOUNTER
Pt states he has not heard anything after doing his PT/INR 2 days ago    Pt can be reached at [phone] - please leave a message

## 2021-09-27 ENCOUNTER — ANTICOAG VISIT (OUTPATIENT)
Dept: FAMILY MEDICINE CLINIC | Facility: HOSPITAL | Age: 36
End: 2021-09-27

## 2021-09-28 LAB — INR PPP: 2.4 (ref 0.84–1.19)

## 2021-09-29 ENCOUNTER — ANTICOAG VISIT (OUTPATIENT)
Dept: FAMILY MEDICINE CLINIC | Facility: HOSPITAL | Age: 36
End: 2021-09-29

## 2021-10-01 ENCOUNTER — TELEPHONE (OUTPATIENT)
Dept: FAMILY MEDICINE CLINIC | Facility: HOSPITAL | Age: 36
End: 2021-10-01

## 2021-10-01 ENCOUNTER — OFFICE VISIT (OUTPATIENT)
Dept: FAMILY MEDICINE CLINIC | Facility: HOSPITAL | Age: 36
End: 2021-10-01
Payer: COMMERCIAL

## 2021-10-01 VITALS
WEIGHT: 315 LBS | DIASTOLIC BLOOD PRESSURE: 70 MMHG | SYSTOLIC BLOOD PRESSURE: 130 MMHG | HEART RATE: 94 BPM | OXYGEN SATURATION: 99 % | BODY MASS INDEX: 47.74 KG/M2 | HEIGHT: 68 IN

## 2021-10-01 DIAGNOSIS — E66.01 MORBID OBESITY WITH BMI OF 50.0-59.9, ADULT (HCC): ICD-10-CM

## 2021-10-01 DIAGNOSIS — R22.0 SWOLLEN LIP: Primary | ICD-10-CM

## 2021-10-01 DIAGNOSIS — I10 HYPERTENSION, UNSPECIFIED TYPE: ICD-10-CM

## 2021-10-01 DIAGNOSIS — R73.01 IMPAIRED FASTING GLUCOSE: ICD-10-CM

## 2021-10-01 DIAGNOSIS — I10 ESSENTIAL HYPERTENSION: ICD-10-CM

## 2021-10-01 PROCEDURE — 99214 OFFICE O/P EST MOD 30 MIN: CPT | Performed by: PHYSICIAN ASSISTANT

## 2021-10-01 RX ORDER — PREDNISONE 10 MG/1
TABLET ORAL
Qty: 10 TABLET | Refills: 0 | Status: SHIPPED | OUTPATIENT
Start: 2021-10-01 | End: 2022-01-21 | Stop reason: ALTCHOICE

## 2021-10-01 RX ORDER — HYDROCHLOROTHIAZIDE 12.5 MG/1
12.5 TABLET ORAL DAILY
Qty: 30 TABLET | Refills: 3 | Status: SHIPPED | OUTPATIENT
Start: 2021-10-01 | End: 2022-01-21 | Stop reason: ALTCHOICE

## 2021-10-03 LAB
ALBUMIN SERPL-MCNC: 4.1 G/DL (ref 4–5)
ALBUMIN/GLOB SERPL: 1.5 {RATIO} (ref 1.2–2.2)
ALP SERPL-CCNC: 86 IU/L (ref 44–121)
ALT SERPL-CCNC: 33 IU/L (ref 0–44)
AST SERPL-CCNC: 19 IU/L (ref 0–40)
BASOPHILS # BLD AUTO: 0 X10E3/UL (ref 0–0.2)
BASOPHILS NFR BLD AUTO: 0 %
BILIRUB SERPL-MCNC: 0.3 MG/DL (ref 0–1.2)
BUN SERPL-MCNC: 14 MG/DL (ref 6–20)
BUN/CREAT SERPL: 15 (ref 9–20)
CALCIUM SERPL-MCNC: 9 MG/DL (ref 8.7–10.2)
CHLORIDE SERPL-SCNC: 103 MMOL/L (ref 96–106)
CHOLEST SERPL-MCNC: 114 MG/DL (ref 100–199)
CO2 SERPL-SCNC: 24 MMOL/L (ref 20–29)
CREAT SERPL-MCNC: 0.94 MG/DL (ref 0.76–1.27)
EOSINOPHIL # BLD AUTO: 0.1 X10E3/UL (ref 0–0.4)
EOSINOPHIL NFR BLD AUTO: 2 %
ERYTHROCYTE [DISTWIDTH] IN BLOOD BY AUTOMATED COUNT: 13.7 % (ref 11.6–15.4)
GLOBULIN SER-MCNC: 2.8 G/DL (ref 1.5–4.5)
GLUCOSE SERPL-MCNC: 109 MG/DL (ref 65–99)
HBA1C MFR BLD: 6.1 % (ref 4.8–5.6)
HCT VFR BLD AUTO: 45.4 % (ref 37.5–51)
HCV AB S/CO SERPL IA: <0.1 S/CO RATIO (ref 0–0.9)
HDLC SERPL-MCNC: 35 MG/DL
HGB BLD-MCNC: 15 G/DL (ref 13–17.7)
HIV 1+2 AB+HIV1 P24 AG SERPL QL IA: NON REACTIVE
IMM GRANULOCYTES # BLD: 0 X10E3/UL (ref 0–0.1)
IMM GRANULOCYTES NFR BLD: 0 %
LDLC SERPL CALC-MCNC: 52 MG/DL (ref 0–99)
LYMPHOCYTES # BLD AUTO: 1.1 X10E3/UL (ref 0.7–3.1)
LYMPHOCYTES NFR BLD AUTO: 15 %
MCH RBC QN AUTO: 27.8 PG (ref 26.6–33)
MCHC RBC AUTO-ENTMCNC: 33 G/DL (ref 31.5–35.7)
MCV RBC AUTO: 84 FL (ref 79–97)
MONOCYTES # BLD AUTO: 0.7 X10E3/UL (ref 0.1–0.9)
MONOCYTES NFR BLD AUTO: 9 %
NEUTROPHILS # BLD AUTO: 5.8 X10E3/UL (ref 1.4–7)
NEUTROPHILS NFR BLD AUTO: 74 %
PLATELET # BLD AUTO: 236 X10E3/UL (ref 150–450)
POTASSIUM SERPL-SCNC: 3.8 MMOL/L (ref 3.5–5.2)
PROT SERPL-MCNC: 6.9 G/DL (ref 6–8.5)
RBC # BLD AUTO: 5.4 X10E6/UL (ref 4.14–5.8)
SL AMB EGFR AFRICAN AMERICAN: 119 ML/MIN/1.73
SL AMB EGFR NON AFRICAN AMERICAN: 103 ML/MIN/1.73
SL AMB VLDL CHOLESTEROL CALC: 27 MG/DL (ref 5–40)
SODIUM SERPL-SCNC: 143 MMOL/L (ref 134–144)
TRIGL SERPL-MCNC: 158 MG/DL (ref 0–149)
TSH SERPL DL<=0.005 MIU/L-ACNC: 1.21 UIU/ML (ref 0.45–4.5)
WBC # BLD AUTO: 7.9 X10E3/UL (ref 3.4–10.8)

## 2021-10-06 ENCOUNTER — ANTICOAG VISIT (OUTPATIENT)
Dept: FAMILY MEDICINE CLINIC | Facility: HOSPITAL | Age: 36
End: 2021-10-06

## 2021-10-06 ENCOUNTER — TELEPHONE (OUTPATIENT)
Dept: FAMILY MEDICINE CLINIC | Facility: HOSPITAL | Age: 36
End: 2021-10-06

## 2021-10-06 LAB — INR PPP: 2.4 (ref 0.84–1.19)

## 2021-10-11 ENCOUNTER — TELEMEDICINE (OUTPATIENT)
Dept: FAMILY MEDICINE CLINIC | Facility: HOSPITAL | Age: 36
End: 2021-10-11
Payer: COMMERCIAL

## 2021-10-11 VITALS — BODY MASS INDEX: 47.74 KG/M2 | TEMPERATURE: 99.9 F | HEIGHT: 68 IN | WEIGHT: 315 LBS

## 2021-10-11 DIAGNOSIS — Z20.822 EXPOSURE TO COVID-19 VIRUS: ICD-10-CM

## 2021-10-11 DIAGNOSIS — B34.9 VIRAL INFECTION, UNSPECIFIED: ICD-10-CM

## 2021-10-11 PROCEDURE — U0005 INFEC AGEN DETEC AMPLI PROBE: HCPCS | Performed by: INTERNAL MEDICINE

## 2021-10-11 PROCEDURE — U0003 INFECTIOUS AGENT DETECTION BY NUCLEIC ACID (DNA OR RNA); SEVERE ACUTE RESPIRATORY SYNDROME CORONAVIRUS 2 (SARS-COV-2) (CORONAVIRUS DISEASE [COVID-19]), AMPLIFIED PROBE TECHNIQUE, MAKING USE OF HIGH THROUGHPUT TECHNOLOGIES AS DESCRIBED BY CMS-2020-01-R: HCPCS | Performed by: INTERNAL MEDICINE

## 2021-10-11 PROCEDURE — 99213 OFFICE O/P EST LOW 20 MIN: CPT | Performed by: INTERNAL MEDICINE

## 2021-10-13 ENCOUNTER — TELEPHONE (OUTPATIENT)
Dept: FAMILY MEDICINE CLINIC | Facility: HOSPITAL | Age: 36
End: 2021-10-13

## 2021-10-13 ENCOUNTER — TELEMEDICINE (OUTPATIENT)
Dept: FAMILY MEDICINE CLINIC | Facility: HOSPITAL | Age: 36
End: 2021-10-13
Payer: COMMERCIAL

## 2021-10-13 VITALS — BODY MASS INDEX: 47.74 KG/M2 | HEIGHT: 68 IN | WEIGHT: 315 LBS

## 2021-10-13 DIAGNOSIS — U07.1 COVID-19: Primary | ICD-10-CM

## 2021-10-13 DIAGNOSIS — E66.01 MORBID OBESITY WITH BMI OF 50.0-59.9, ADULT (HCC): ICD-10-CM

## 2021-10-13 PROCEDURE — 3008F BODY MASS INDEX DOCD: CPT | Performed by: INTERNAL MEDICINE

## 2021-10-13 PROCEDURE — 1036F TOBACCO NON-USER: CPT | Performed by: INTERNAL MEDICINE

## 2021-10-13 PROCEDURE — 99213 OFFICE O/P EST LOW 20 MIN: CPT | Performed by: INTERNAL MEDICINE

## 2021-10-13 RX ORDER — SODIUM CHLORIDE 9 MG/ML
20 INJECTION, SOLUTION INTRAVENOUS ONCE
Status: CANCELLED | OUTPATIENT
Start: 2021-10-14

## 2021-10-13 RX ORDER — ACETAMINOPHEN 325 MG/1
650 TABLET ORAL ONCE AS NEEDED
Status: CANCELLED | OUTPATIENT
Start: 2021-10-14

## 2021-10-13 RX ORDER — ONDANSETRON 2 MG/ML
4 INJECTION INTRAMUSCULAR; INTRAVENOUS ONCE AS NEEDED
Status: CANCELLED | OUTPATIENT
Start: 2021-10-14

## 2021-10-13 RX ORDER — ALBUTEROL SULFATE 90 UG/1
3 AEROSOL, METERED RESPIRATORY (INHALATION) ONCE AS NEEDED
Status: CANCELLED | OUTPATIENT
Start: 2021-10-14

## 2021-10-14 ENCOUNTER — HOSPITAL ENCOUNTER (OUTPATIENT)
Dept: INFUSION CENTER | Facility: HOSPITAL | Age: 36
Discharge: HOME | End: 2021-10-14
Payer: COMMERCIAL

## 2021-10-14 VITALS
SYSTOLIC BLOOD PRESSURE: 112 MMHG | TEMPERATURE: 100.6 F | OXYGEN SATURATION: 96 % | HEART RATE: 84 BPM | RESPIRATION RATE: 16 BRPM | DIASTOLIC BLOOD PRESSURE: 64 MMHG

## 2021-10-14 DIAGNOSIS — U07.1 COVID-19: Primary | ICD-10-CM

## 2021-10-14 PROCEDURE — M0245 HB BAMLAN AND ETESEV INF ADMIN: HCPCS | Performed by: INTERNAL MEDICINE

## 2021-10-14 RX ORDER — ACETAMINOPHEN 325 MG/1
650 TABLET ORAL ONCE AS NEEDED
Status: CANCELLED | OUTPATIENT
Start: 2021-10-14

## 2021-10-14 RX ORDER — ALBUTEROL SULFATE 90 UG/1
3 AEROSOL, METERED RESPIRATORY (INHALATION) ONCE AS NEEDED
Status: CANCELLED | OUTPATIENT
Start: 2021-10-14

## 2021-10-14 RX ORDER — ONDANSETRON 2 MG/ML
4 INJECTION INTRAMUSCULAR; INTRAVENOUS ONCE AS NEEDED
Status: CANCELLED | OUTPATIENT
Start: 2021-10-14

## 2021-10-14 RX ORDER — SODIUM CHLORIDE 9 MG/ML
20 INJECTION, SOLUTION INTRAVENOUS ONCE
Status: COMPLETED | OUTPATIENT
Start: 2021-10-14 | End: 2021-10-14

## 2021-10-14 RX ORDER — ACETAMINOPHEN 325 MG/1
650 TABLET ORAL ONCE AS NEEDED
Status: DISCONTINUED | OUTPATIENT
Start: 2021-10-14 | End: 2021-10-17 | Stop reason: HOSPADM

## 2021-10-14 RX ORDER — ONDANSETRON 2 MG/ML
4 INJECTION INTRAMUSCULAR; INTRAVENOUS ONCE AS NEEDED
Status: DISCONTINUED | OUTPATIENT
Start: 2021-10-14 | End: 2021-10-17 | Stop reason: HOSPADM

## 2021-10-14 RX ORDER — SODIUM CHLORIDE 9 MG/ML
20 INJECTION, SOLUTION INTRAVENOUS ONCE
Status: CANCELLED | OUTPATIENT
Start: 2021-10-14

## 2021-10-14 RX ORDER — ALBUTEROL SULFATE 90 UG/1
3 AEROSOL, METERED RESPIRATORY (INHALATION) ONCE AS NEEDED
Status: DISCONTINUED | OUTPATIENT
Start: 2021-10-14 | End: 2021-10-17 | Stop reason: HOSPADM

## 2021-10-14 RX ADMIN — ACETAMINOPHEN 650 MG: 325 TABLET, FILM COATED ORAL at 14:40

## 2021-10-14 RX ADMIN — SODIUM CHLORIDE 20 ML/HR: 9 INJECTION, SOLUTION INTRAVENOUS at 14:09

## 2021-10-14 RX ADMIN — SODIUM CHLORIDE 2100 MG COMBINED: 9 INJECTION, SOLUTION INTRAVENOUS at 14:09

## 2021-10-15 ENCOUNTER — TELEMEDICINE (OUTPATIENT)
Dept: FAMILY MEDICINE CLINIC | Facility: HOSPITAL | Age: 36
End: 2021-10-15
Payer: COMMERCIAL

## 2021-10-15 DIAGNOSIS — J06.9 VIRAL URI WITH COUGH: ICD-10-CM

## 2021-10-15 DIAGNOSIS — A08.4 VIRAL DIARRHEA: ICD-10-CM

## 2021-10-15 DIAGNOSIS — U07.1 COVID-19: Primary | ICD-10-CM

## 2021-10-15 PROCEDURE — 99442 PR PHYS/QHP TELEPHONE EVALUATION 11-20 MIN: CPT | Performed by: STUDENT IN AN ORGANIZED HEALTH CARE EDUCATION/TRAINING PROGRAM

## 2021-10-20 ENCOUNTER — ANTICOAG VISIT (OUTPATIENT)
Dept: FAMILY MEDICINE CLINIC | Facility: HOSPITAL | Age: 36
End: 2021-10-20

## 2021-10-20 ENCOUNTER — TELEPHONE (OUTPATIENT)
Dept: FAMILY MEDICINE CLINIC | Facility: HOSPITAL | Age: 36
End: 2021-10-20

## 2021-10-20 LAB — INR PPP: 5.8 (ref 0.84–1.19)

## 2021-10-22 ENCOUNTER — ANTICOAG VISIT (OUTPATIENT)
Dept: FAMILY MEDICINE CLINIC | Facility: HOSPITAL | Age: 36
End: 2021-10-22

## 2021-10-22 ENCOUNTER — TELEPHONE (OUTPATIENT)
Dept: FAMILY MEDICINE CLINIC | Facility: HOSPITAL | Age: 36
End: 2021-10-22

## 2021-10-22 LAB — INR PPP: 4.7 (ref 0.84–1.19)

## 2021-10-25 ENCOUNTER — ANTICOAG VISIT (OUTPATIENT)
Dept: FAMILY MEDICINE CLINIC | Facility: HOSPITAL | Age: 36
End: 2021-10-25

## 2021-10-25 ENCOUNTER — TELEPHONE (OUTPATIENT)
Dept: FAMILY MEDICINE CLINIC | Facility: HOSPITAL | Age: 36
End: 2021-10-25

## 2021-10-25 LAB — INR PPP: 1.6 (ref 0.84–1.19)

## 2021-11-01 ENCOUNTER — TELEPHONE (OUTPATIENT)
Dept: FAMILY MEDICINE CLINIC | Facility: HOSPITAL | Age: 36
End: 2021-11-01

## 2021-11-01 ENCOUNTER — ANTICOAG VISIT (OUTPATIENT)
Dept: FAMILY MEDICINE CLINIC | Facility: HOSPITAL | Age: 36
End: 2021-11-01

## 2021-11-01 LAB — INR PPP: 1.5 (ref 0.84–1.19)

## 2021-11-08 ENCOUNTER — ANTICOAG VISIT (OUTPATIENT)
Dept: FAMILY MEDICINE CLINIC | Facility: HOSPITAL | Age: 36
End: 2021-11-08

## 2021-11-08 LAB — INR PPP: 1.7 (ref 0.84–1.19)

## 2021-11-15 ENCOUNTER — ANTICOAG VISIT (OUTPATIENT)
Dept: FAMILY MEDICINE CLINIC | Facility: HOSPITAL | Age: 36
End: 2021-11-15

## 2021-11-15 LAB — INR PPP: 1.8 (ref 0.84–1.19)

## 2021-11-23 ENCOUNTER — TELEPHONE (OUTPATIENT)
Dept: FAMILY MEDICINE CLINIC | Facility: HOSPITAL | Age: 36
End: 2021-11-23

## 2021-11-23 ENCOUNTER — ANTICOAG VISIT (OUTPATIENT)
Dept: FAMILY MEDICINE CLINIC | Facility: HOSPITAL | Age: 36
End: 2021-11-23

## 2021-11-23 LAB — INR PPP: 2.1 (ref 0.84–1.19)

## 2021-11-29 ENCOUNTER — ANTICOAG VISIT (OUTPATIENT)
Dept: FAMILY MEDICINE CLINIC | Facility: HOSPITAL | Age: 36
End: 2021-11-29

## 2021-11-29 LAB — INR PPP: 1.8 (ref 0.84–1.19)

## 2021-12-04 DIAGNOSIS — T78.40XS ALLERGY, SEQUELA: ICD-10-CM

## 2021-12-06 ENCOUNTER — ANTICOAG VISIT (OUTPATIENT)
Dept: FAMILY MEDICINE CLINIC | Facility: HOSPITAL | Age: 36
End: 2021-12-06

## 2021-12-06 RX ORDER — MONTELUKAST SODIUM 10 MG/1
TABLET ORAL
Qty: 30 TABLET | Refills: 11 | Status: SHIPPED | OUTPATIENT
Start: 2021-12-06

## 2021-12-13 ENCOUNTER — ANTICOAG VISIT (OUTPATIENT)
Dept: FAMILY MEDICINE CLINIC | Facility: HOSPITAL | Age: 36
End: 2021-12-13

## 2021-12-13 LAB — INR PPP: 3 (ref 0.84–1.19)

## 2021-12-21 ENCOUNTER — TELEPHONE (OUTPATIENT)
Dept: FAMILY MEDICINE CLINIC | Facility: HOSPITAL | Age: 36
End: 2021-12-21

## 2021-12-21 ENCOUNTER — ANTICOAG VISIT (OUTPATIENT)
Dept: FAMILY MEDICINE CLINIC | Facility: HOSPITAL | Age: 36
End: 2021-12-21

## 2021-12-21 LAB — INR PPP: 3.2 (ref 0.84–1.19)

## 2021-12-27 ENCOUNTER — ANTICOAG VISIT (OUTPATIENT)
Dept: FAMILY MEDICINE CLINIC | Facility: HOSPITAL | Age: 36
End: 2021-12-27

## 2021-12-27 LAB — INR PPP: 3.3 (ref 0.84–1.19)

## 2022-01-12 ENCOUNTER — TELEPHONE (OUTPATIENT)
Dept: FAMILY MEDICINE CLINIC | Facility: HOSPITAL | Age: 37
End: 2022-01-12

## 2022-01-12 ENCOUNTER — ANTICOAG VISIT (OUTPATIENT)
Dept: FAMILY MEDICINE CLINIC | Facility: HOSPITAL | Age: 37
End: 2022-01-12

## 2022-01-12 LAB — INR PPP: 2.5 (ref 0.84–1.19)

## 2022-01-12 NOTE — TELEPHONE ENCOUNTER
Pt states he tested his Pt/Inr on Monday and it was 2 5    Pt can be reached at [phone] - leave message if no answer

## 2022-01-21 ENCOUNTER — OFFICE VISIT (OUTPATIENT)
Dept: FAMILY MEDICINE CLINIC | Facility: HOSPITAL | Age: 37
End: 2022-01-21
Payer: COMMERCIAL

## 2022-01-21 VITALS
HEART RATE: 92 BPM | DIASTOLIC BLOOD PRESSURE: 80 MMHG | SYSTOLIC BLOOD PRESSURE: 130 MMHG | WEIGHT: 315 LBS | BODY MASS INDEX: 58.3 KG/M2

## 2022-01-21 DIAGNOSIS — F11.20 CONTINUOUS OPIOID DEPENDENCE (HCC): ICD-10-CM

## 2022-01-21 DIAGNOSIS — R73.01 IMPAIRED FASTING GLUCOSE: ICD-10-CM

## 2022-01-21 DIAGNOSIS — H00.011 HORDEOLUM EXTERNUM OF RIGHT UPPER EYELID: ICD-10-CM

## 2022-01-21 DIAGNOSIS — E66.01 MORBID OBESITY WITH BMI OF 50.0-59.9, ADULT (HCC): ICD-10-CM

## 2022-01-21 DIAGNOSIS — Z79.01 CHRONIC ANTICOAGULATION: ICD-10-CM

## 2022-01-21 DIAGNOSIS — I63.9 CEREBROVASCULAR ACCIDENT (CVA), UNSPECIFIED MECHANISM (HCC): ICD-10-CM

## 2022-01-21 DIAGNOSIS — E78.49 OTHER HYPERLIPIDEMIA: ICD-10-CM

## 2022-01-21 PROCEDURE — 99214 OFFICE O/P EST MOD 30 MIN: CPT | Performed by: PHYSICIAN ASSISTANT

## 2022-01-21 PROCEDURE — 1036F TOBACCO NON-USER: CPT | Performed by: PHYSICIAN ASSISTANT

## 2022-01-21 RX ORDER — ATORVASTATIN CALCIUM 20 MG/1
20 TABLET, FILM COATED ORAL 3 TIMES WEEKLY
Qty: 90 TABLET | Refills: 3 | Status: SHIPPED | OUTPATIENT
Start: 2022-01-21 | End: 2022-07-09

## 2022-01-21 RX ORDER — TOBRAMYCIN AND DEXAMETHASONE 3; 1 MG/ML; MG/ML
1 SUSPENSION/ DROPS OPHTHALMIC 4 TIMES DAILY
Qty: 5 ML | Refills: 0 | Status: SHIPPED | OUTPATIENT
Start: 2022-01-21

## 2022-01-21 NOTE — PROGRESS NOTES
Assessment/Plan: Morbid obesity-  Discussed meal planning, recommend  3 balanced meals daily with protein, or protein snacks  Prediabetes- repeat blood test in 3 months  Problem List Items Addressed This Visit        Other    Other hyperlipidemia - Primary    Relevant Medications    atorvastatin (LIPITOR) 20 mg tablet      Other Visit Diagnoses    HTN-   Continue to monitor blood pressure  Subjective:      Patient ID: Tahmina Davis is a 44 y o  male  Presents for follow up  Appears a bit stressed, thought he was late, has to go to work, could not find insurance card, was rushing  Has ongoing swelling in feet  Monitors blood pressure, usually WNL  Noticed lump on right upper eye lid, not affecting vision  Likes working, works full time, Monday to Friday  Review of Systems   Constitutional: Positive for fatigue  Negative for chills, diaphoresis and fever  Eyes: Negative for pain, discharge and visual disturbance  Respiratory: Negative for cough, chest tightness and shortness of breath  Gastrointestinal: Positive for diarrhea  Negative for abdominal pain, constipation, nausea and vomiting  Occasional diarrhea -  Once a week  Musculoskeletal: Negative for arthralgias, back pain, myalgias, neck pain and neck stiffness  Neurological: Positive for numbness  Negative for dizziness, light-headedness and headaches  Objective:      /90 (BP Location: Right arm, Patient Position: Sitting, Cuff Size: Large)   Pulse 92   Wt (!) 171 kg (377 lb 12 8 oz)   BMI 58 30 kg/m²          Physical Exam  Vitals reviewed  Constitutional:       General: He is not in acute distress  Appearance: He is obese  He is not ill-appearing, toxic-appearing or diaphoretic  HENT:      Head: Normocephalic and atraumatic  Eyes:      General: No scleral icterus  Right eye: No discharge  Left eye: No discharge        Extraocular Movements: Extraocular movements intact  Conjunctiva/sclera: Conjunctivae normal       Comments: Lump noted right upper eyelid  Cardiovascular:      Rate and Rhythm: Normal rate and regular rhythm  Pulses: Normal pulses  Heart sounds: Normal heart sounds  Pulmonary:      Effort: Pulmonary effort is normal  No respiratory distress  Breath sounds: Normal breath sounds  No stridor  No wheezing, rhonchi or rales  Musculoskeletal:         General: No swelling, tenderness, deformity or signs of injury  Normal range of motion  Right lower leg: Edema present  Left lower leg: Edema present  Neurological:      General: No focal deficit present  Mental Status: He is alert and oriented to person, place, and time  Cranial Nerves: No cranial nerve deficit  Sensory: No sensory deficit  Motor: No weakness  Coordination: Coordination normal    Psychiatric:         Behavior: Behavior normal          Thought Content: Thought content normal          Judgment: Judgment normal       Comments: Low mood, upset, was rushing this morning  BMI Counseling: Body mass index is 58 3 kg/m²  The BMI is above normal  Nutrition recommendations include 3-5 servings of fruits/vegetables daily

## 2022-01-21 NOTE — PATIENT INSTRUCTIONS
Weight Management   AMBULATORY CARE:   Why it is important to manage your weight:  Being overweight increases your risk of health conditions such as heart disease, high blood pressure, type 2 diabetes, and certain types of cancer  It can also increase your risk for osteoarthritis, sleep apnea, and other respiratory problems  Aim for a slow, steady weight loss  Even a small amount of weight loss can lower your risk of health problems  How to lose weight safely:  A safe and healthy way to lose weight is to eat fewer calories and get regular exercise  · You can lose up about 1 pound a week by decreasing the number of calories you eat by 500 calories each day  You can decrease calories by eating smaller portion sizes or by cutting out high-calorie foods  Read labels to find out how many calories are in the foods you eat  · You can also burn calories with exercise such as walking, swimming, or biking  You will be more likely to keep weight off if you make these changes part of your lifestyle  Exercise at least 30 minutes per day on most days of the week  You can also fit in more physical activity by taking the stairs instead of the elevator or parking farther away from stores  Ask your healthcare provider about the best exercise plan for you  Healthy meal plan for weight management:  A healthy meal plan includes a variety of foods, contains fewer calories, and helps you stay healthy  A healthy meal plan includes the following:     · Eat whole-grain foods more often  A healthy meal plan should contain fiber  Fiber is the part of grains, fruits, and vegetables that is not broken down by your body  Whole-grain foods are healthy and provide extra fiber in your diet  Some examples of whole-grain foods are whole-wheat breads and pastas, oatmeal, brown rice, and bulgur  · Eat a variety of vegetables every day  Include dark, leafy greens such as spinach, kale, danilo greens, and mustard greens   Eat yellow and orange vegetables such as carrots, sweet potatoes, and winter squash  · Eat a variety of fruits every day  Choose fresh or canned fruit (canned in its own juice or light syrup) instead of juice  Fruit juice has very little or no fiber  · Eat low-fat dairy foods  Drink fat-free (skim) milk or 1% milk  Eat fat-free yogurt and low-fat cottage cheese  Try low-fat cheeses such as mozzarella and other reduced-fat cheeses  · Choose meat and other protein foods that are low in fat  Choose beans or other legumes such as split peas or lentils  Choose fish, skinless poultry (chicken or turkey), or lean cuts of red meat (beef or pork)  Before you cook meat or poultry, cut off any visible fat  · Use less fat and oil  Try baking foods instead of frying them  Add less fat, such as margarine, sour cream, regular salad dressing and mayonnaise to foods  Eat fewer high-fat foods  Some examples of high-fat foods include french fries, doughnuts, ice cream, and cakes  · Eat fewer sweets  Limit foods and drinks that are high in sugar  This includes candy, cookies, regular soda, and sweetened drinks  Ways to decrease calories:   · Eat smaller portions  ? Use a small plate with smaller servings  ? Do not eat second helpings  ? When you eat at a restaurant, ask for a box and place half of your meal in the box before you eat  ? Share an entrée with someone else  · Replace high-calorie snacks with healthy, low-calorie snacks  ? Choose fresh fruit, vegetables, fat-free rice cakes, or air-popped popcorn instead of potato chips, nuts, or chocolate  ? Choose water or calorie-free drinks instead of soda or sweetened drinks  · Do not shop for groceries when you are hungry  You may be more likely to make unhealthy food choices  Take a grocery list of healthy foods and shop after you have eaten  · Eat regular meals  Do not skip meals  Skipping meals can lead to overeating later in the day  This can make it harder for you to lose weight  Eat a healthy snack in place of a meal if you do not have time to eat a regular meal  Talk with a dietitian to help you create a meal plan and schedule that is right for you  Other things to consider as you try to lose weight:   · Be aware of situations that may give you the urge to overeat, such as eating while watching television  Find ways to avoid these situations  For example, read a book, go for a walk, or do crafts  · Meet with a weight loss support group or friends who are also trying to lose weight  This may help you stay motivated to continue working on your weight loss goals  © Copyright [employer] 2021 Information is for End User's use only and may not be sold, redistributed or otherwise used for commercial purposes  All illustrations and images included in CareNotes® are the copyrighted property of A D A M , Inc  or [facility] [employer]   The above information is an  only  It is not intended as medical advice for individual conditions or treatments  Talk to your doctor, nurse or pharmacist before following any medical regimen to see if it is safe and effective for you  Take Lipitor every other day, when current rx  Is completed, new dose will be 20mg  To be taken Monday, Wed ,  And Friday  Repeat blood test in 3 month  Encouraged to monitor blood pressure, goal below 140/90

## 2022-01-24 ENCOUNTER — ANTICOAG VISIT (OUTPATIENT)
Dept: FAMILY MEDICINE CLINIC | Facility: HOSPITAL | Age: 37
End: 2022-01-24

## 2022-01-24 ENCOUNTER — TELEPHONE (OUTPATIENT)
Dept: FAMILY MEDICINE CLINIC | Facility: HOSPITAL | Age: 37
End: 2022-01-24

## 2022-01-24 LAB — INR PPP: 2.4 (ref 0.84–1.19)

## 2022-02-07 ENCOUNTER — ANTICOAG VISIT (OUTPATIENT)
Dept: FAMILY MEDICINE CLINIC | Facility: HOSPITAL | Age: 37
End: 2022-02-07

## 2022-02-07 LAB — INR PPP: 3 (ref 0.84–1.19)

## 2022-02-21 ENCOUNTER — ANTICOAG VISIT (OUTPATIENT)
Dept: FAMILY MEDICINE CLINIC | Facility: HOSPITAL | Age: 37
End: 2022-02-21

## 2022-02-21 LAB — INR PPP: 2.3 (ref 0.84–1.19)

## 2022-03-07 ENCOUNTER — ANTICOAG VISIT (OUTPATIENT)
Dept: FAMILY MEDICINE CLINIC | Facility: HOSPITAL | Age: 37
End: 2022-03-07

## 2022-03-07 ENCOUNTER — TELEPHONE (OUTPATIENT)
Dept: FAMILY MEDICINE CLINIC | Facility: HOSPITAL | Age: 37
End: 2022-03-07

## 2022-03-07 LAB — INR PPP: 1.5 (ref 0.84–1.19)

## 2022-03-07 NOTE — TELEPHONE ENCOUNTER
Per lori  Even with 1 missed dose, still low at 1 5  Take 7 5 mg on Tue/Thur only,  10 mg rest   Recheck in 1 wk on mon 3/14  LDMOV for pt    dk

## 2022-03-14 ENCOUNTER — ANTICOAG VISIT (OUTPATIENT)
Dept: FAMILY MEDICINE CLINIC | Facility: HOSPITAL | Age: 37
End: 2022-03-14

## 2022-03-14 ENCOUNTER — TELEPHONE (OUTPATIENT)
Dept: FAMILY MEDICINE CLINIC | Facility: HOSPITAL | Age: 37
End: 2022-03-14

## 2022-03-14 LAB — INR PPP: 2.3 (ref 0.84–1.19)

## 2022-03-15 DIAGNOSIS — I10 ESSENTIAL HYPERTENSION: ICD-10-CM

## 2022-03-15 RX ORDER — AMLODIPINE BESYLATE 10 MG/1
TABLET ORAL
Qty: 30 TABLET | Refills: 11 | Status: SHIPPED | OUTPATIENT
Start: 2022-03-15 | End: 2022-06-20

## 2022-04-04 ENCOUNTER — ANTICOAG VISIT (OUTPATIENT)
Dept: FAMILY MEDICINE CLINIC | Facility: HOSPITAL | Age: 37
End: 2022-04-04

## 2022-04-04 LAB — INR PPP: 2.3 (ref 0.84–1.19)

## 2022-04-15 ENCOUNTER — TELEPHONE (OUTPATIENT)
Dept: OTHER | Facility: OTHER | Age: 37
End: 2022-04-15

## 2022-04-18 NOTE — TELEPHONE ENCOUNTER
Had a problem with PT/INR machine  He is getting an error  He will call supplier of machine  Will call and reschedule missed appt today

## 2022-04-19 ENCOUNTER — ANTICOAG VISIT (OUTPATIENT)
Dept: FAMILY MEDICINE CLINIC | Facility: HOSPITAL | Age: 37
End: 2022-04-19

## 2022-04-19 LAB — INR PPP: 3.5 (ref 0.84–1.19)

## 2022-04-26 ENCOUNTER — ANTICOAG VISIT (OUTPATIENT)
Dept: FAMILY MEDICINE CLINIC | Facility: HOSPITAL | Age: 37
End: 2022-04-26

## 2022-04-26 LAB — INR PPP: 3.2 (ref 0.84–1.19)

## 2022-05-02 ENCOUNTER — ANTICOAG VISIT (OUTPATIENT)
Dept: FAMILY MEDICINE CLINIC | Facility: HOSPITAL | Age: 37
End: 2022-05-02

## 2022-05-02 LAB — INR PPP: 2.8 (ref 0.84–1.19)

## 2022-05-10 ENCOUNTER — ANTICOAG VISIT (OUTPATIENT)
Dept: FAMILY MEDICINE CLINIC | Facility: HOSPITAL | Age: 37
End: 2022-05-10

## 2022-05-10 LAB — INR PPP: 2.4 (ref 0.84–1.19)

## 2022-05-24 ENCOUNTER — ANTICOAG VISIT (OUTPATIENT)
Dept: FAMILY MEDICINE CLINIC | Facility: HOSPITAL | Age: 37
End: 2022-05-24

## 2022-05-24 LAB — INR PPP: 2.7 (ref 0.84–1.19)

## 2022-06-07 ENCOUNTER — ANTICOAG VISIT (OUTPATIENT)
Dept: FAMILY MEDICINE CLINIC | Facility: HOSPITAL | Age: 37
End: 2022-06-07

## 2022-06-07 LAB — INR PPP: 3.1 (ref 0.84–1.19)

## 2022-06-22 ENCOUNTER — ANTICOAG VISIT (OUTPATIENT)
Dept: FAMILY MEDICINE CLINIC | Facility: HOSPITAL | Age: 37
End: 2022-06-22

## 2022-06-22 LAB — INR PPP: 2.4 (ref 0.84–1.19)

## 2022-07-09 DIAGNOSIS — I10 HYPERTENSION, UNSPECIFIED TYPE: ICD-10-CM

## 2022-07-09 DIAGNOSIS — E78.49 OTHER HYPERLIPIDEMIA: ICD-10-CM

## 2022-07-09 RX ORDER — LISINOPRIL AND HYDROCHLOROTHIAZIDE 20; 12.5 MG/1; MG/1
TABLET ORAL
Qty: 90 TABLET | Refills: 4 | Status: SHIPPED | OUTPATIENT
Start: 2022-07-09

## 2022-07-09 RX ORDER — ATORVASTATIN CALCIUM 20 MG/1
TABLET, FILM COATED ORAL
Qty: 36 TABLET | Refills: 10 | Status: SHIPPED | OUTPATIENT
Start: 2022-07-09

## 2022-07-12 ENCOUNTER — ANTICOAG VISIT (OUTPATIENT)
Dept: FAMILY MEDICINE CLINIC | Facility: HOSPITAL | Age: 37
End: 2022-07-12

## 2022-07-12 LAB — INR PPP: 3.5 (ref 0.84–1.19)

## 2022-07-26 ENCOUNTER — ANTICOAG VISIT (OUTPATIENT)
Dept: FAMILY MEDICINE CLINIC | Facility: HOSPITAL | Age: 37
End: 2022-07-26

## 2022-07-26 LAB — INR PPP: 2.8 (ref 0.84–1.19)

## 2022-08-09 ENCOUNTER — ANTICOAG VISIT (OUTPATIENT)
Dept: FAMILY MEDICINE CLINIC | Facility: HOSPITAL | Age: 37
End: 2022-08-09

## 2022-08-09 LAB — INR PPP: 3.6 (ref 0.84–1.19)

## 2022-08-16 ENCOUNTER — ANTICOAG VISIT (OUTPATIENT)
Dept: FAMILY MEDICINE CLINIC | Facility: HOSPITAL | Age: 37
End: 2022-08-16

## 2022-08-16 LAB — INR PPP: 4 (ref 0.84–1.19)

## 2022-08-29 DIAGNOSIS — I10 ESSENTIAL HYPERTENSION: ICD-10-CM

## 2022-08-30 RX ORDER — SPIRONOLACTONE 25 MG/1
TABLET ORAL
Qty: 90 TABLET | Refills: 3 | Status: SHIPPED | OUTPATIENT
Start: 2022-08-30

## 2022-09-06 ENCOUNTER — ANTICOAG VISIT (OUTPATIENT)
Dept: FAMILY MEDICINE CLINIC | Facility: HOSPITAL | Age: 37
End: 2022-09-06

## 2022-09-06 LAB — INR PPP: 2.1 (ref 0.84–1.19)

## 2022-09-08 NOTE — TELEPHONE ENCOUNTER
Done Patient arrives to triage ambulatory with steady gait c/o chest pain that has been constant X30 minutes associated with shortness of breath. Patient with non-labored breathing speaking in full sentences.

## 2022-09-27 ENCOUNTER — ANTICOAG VISIT (OUTPATIENT)
Dept: FAMILY MEDICINE CLINIC | Facility: HOSPITAL | Age: 37
End: 2022-09-27

## 2022-09-27 LAB — INR PPP: 1.9 (ref 0.84–1.19)

## 2022-10-04 ENCOUNTER — ANTICOAG VISIT (OUTPATIENT)
Dept: FAMILY MEDICINE CLINIC | Facility: HOSPITAL | Age: 37
End: 2022-10-04

## 2022-10-04 LAB — INR PPP: 2.1 (ref 0.84–1.19)

## 2022-10-20 ENCOUNTER — ANTICOAG VISIT (OUTPATIENT)
Dept: FAMILY MEDICINE CLINIC | Facility: HOSPITAL | Age: 37
End: 2022-10-20

## 2022-10-20 LAB — INR PPP: 1.6 (ref 0.84–1.19)

## 2022-10-28 ENCOUNTER — ANTICOAG VISIT (OUTPATIENT)
Dept: FAMILY MEDICINE CLINIC | Facility: HOSPITAL | Age: 37
End: 2022-10-28

## 2022-10-28 LAB — INR PPP: 2.8 (ref 0.84–1.19)

## 2022-11-08 ENCOUNTER — ANTICOAG VISIT (OUTPATIENT)
Dept: FAMILY MEDICINE CLINIC | Facility: HOSPITAL | Age: 37
End: 2022-11-08

## 2022-11-08 LAB — INR PPP: 2.1 (ref 0.84–1.19)

## 2022-11-15 DIAGNOSIS — I63.9 CEREBROVASCULAR ACCIDENT (CVA), UNSPECIFIED MECHANISM (HCC): ICD-10-CM

## 2022-11-15 RX ORDER — WARFARIN SODIUM 5 MG/1
TABLET ORAL
Qty: 180 TABLET | Refills: 1 | Status: SHIPPED | OUTPATIENT
Start: 2022-11-15

## 2022-11-21 ENCOUNTER — OFFICE VISIT (OUTPATIENT)
Dept: FAMILY MEDICINE CLINIC | Facility: HOSPITAL | Age: 37
End: 2022-11-21

## 2022-11-21 VITALS
OXYGEN SATURATION: 98 % | TEMPERATURE: 99.8 F | HEIGHT: 66 IN | SYSTOLIC BLOOD PRESSURE: 150 MMHG | HEART RATE: 86 BPM | WEIGHT: 315 LBS | BODY MASS INDEX: 50.62 KG/M2 | DIASTOLIC BLOOD PRESSURE: 82 MMHG

## 2022-11-21 DIAGNOSIS — T78.40XS ALLERGY, SEQUELA: ICD-10-CM

## 2022-11-21 DIAGNOSIS — J06.9 VIRAL UPPER RESPIRATORY TRACT INFECTION: Primary | ICD-10-CM

## 2022-11-21 RX ORDER — CETIRIZINE HYDROCHLORIDE 10 MG/1
10 TABLET ORAL DAILY
Qty: 90 TABLET | Refills: 1 | Status: SHIPPED | OUTPATIENT
Start: 2022-11-21

## 2022-11-21 RX ORDER — MONTELUKAST SODIUM 10 MG/1
10 TABLET ORAL
Qty: 90 TABLET | Refills: 1 | Status: SHIPPED | OUTPATIENT
Start: 2022-11-21

## 2022-11-21 RX ORDER — GUAIFENESIN, PSEUDOEPHEDRINE HYDROCHLORIDE 600; 60 MG/1; MG/1
1 TABLET, EXTENDED RELEASE ORAL EVERY 12 HOURS
Qty: 30 TABLET | Refills: 1 | Status: SHIPPED | OUTPATIENT
Start: 2022-11-21

## 2022-11-21 NOTE — PROGRESS NOTES
[facility],     Assessment/Plan:      Diagnosis ICD-10-CM Associated Orders   1  Viral upper respiratory tract infection  J06 9 Covid/Flu- Office Collect     pseudoephedrine-guaifenesin (MUCINEX D)  MG per tablet      2  Allergy, sequela  T78 40XS montelukast (SINGULAIR) 10 mg tablet     cetirizine (ZyrTEC) 10 mg tablet        • Consider abx if covid/flu negative  • Use inhaler, singulair & zyrtec  Try mucinex  Return for Annual Physical- Dr Zabrina Acuña or Trena Escobedo  • Patient may call or return to office with any questions or concerns  ______________________________________________________________________  Subjective:     Patient ID: Wing Shah is a 44 y o  male  HPI  Wing Shah  Chief Complaint   Patient presents with   • Cough   • Sinus Problem     Sx Saturday      Started Saturday with cough & nose runny  No known sick contacts  Had covid 10/25/21  No vaccines  Antibody infusion last time  Had flu twice before, feels different this time  The following portions of the patient's history were reviewed and updated as appropriate: allergies, current medications, past medical history, and problem list     Review of Systems   Constitutional: Positive for fever  Negative for chills, diaphoresis and fatigue  HENT: Positive for congestion, ear pain (mild yesterday), postnasal drip and rhinorrhea  Negative for sinus pressure, sinus pain, sneezing, sore throat and voice change  Eyes: Negative for pain and itching  Respiratory: Positive for cough and wheezing  Negative for shortness of breath  Cardiovascular: Negative for chest pain and palpitations  Musculoskeletal: Negative for arthralgias and myalgias  Neurological: Negative for dizziness, light-headedness and headaches  Objective:      Vitals:    11/21/22 1630   BP: 150/82   Pulse: 86   Temp: 99 8 °F (37 7 °C)   SpO2: 98%      Physical Exam  Vitals reviewed     Constitutional:       General: He is not in acute distress  Appearance: Normal appearance  He is well-developed and well-nourished  He is obese  He is not ill-appearing  HENT:      Head: Normocephalic and atraumatic  Right Ear: Tympanic membrane, ear canal and external ear normal  There is no impacted cerumen  Left Ear: Tympanic membrane, ear canal and external ear normal  There is no impacted cerumen  Nose: Congestion (stuffy) and rhinorrhea present  Mouth/Throat:      Mouth: Mucous membranes are moist       Pharynx: Oropharynx is clear  No oropharyngeal exudate  Eyes:      General: No scleral icterus  Right eye: No discharge  Left eye: No discharge  Cardiovascular:      Rate and Rhythm: Normal rate and regular rhythm  Pulses: Normal pulses and intact distal pulses  Heart sounds: Normal heart sounds  No murmur heard  No friction rub  Pulmonary:      Effort: Pulmonary effort is normal  No respiratory distress  Breath sounds: Normal breath sounds  No stridor  No wheezing  Musculoskeletal:      Cervical back: Normal range of motion and neck supple  No rigidity or tenderness  Right lower leg: No edema  Left lower leg: No edema  Lymphadenopathy:      Cervical: No cervical adenopathy  Skin:     General: Skin is warm and dry  Neurological:      Mental Status: He is alert and oriented to person, place, and time  Gait: Gait normal    Psychiatric:         Mood and Affect: Mood and affect and mood normal          Behavior: Behavior normal          Thought Content: Thought content normal          Judgment: Judgment normal            Portions of the record may have been created with voice recognition software  Occasional wrong word or "sound alike" substitutions may have occurred due to the inherent limitations of voice recognition software  Please review the chart carefully and recognize, using context, where substitutions/typographical errors may have occurred

## 2022-11-22 LAB
FLUAV RNA RESP QL NAA+PROBE: NEGATIVE
FLUBV RNA RESP QL NAA+PROBE: NEGATIVE
SARS-COV-2 RNA RESP QL NAA+PROBE: NEGATIVE

## 2022-11-23 ENCOUNTER — TELEPHONE (OUTPATIENT)
Dept: FAMILY MEDICINE CLINIC | Facility: HOSPITAL | Age: 37
End: 2022-11-23

## 2022-11-23 DIAGNOSIS — J06.9 VIRAL UPPER RESPIRATORY TRACT INFECTION: Primary | ICD-10-CM

## 2022-11-23 RX ORDER — LEVOFLOXACIN 500 MG/1
500 TABLET, FILM COATED ORAL EVERY 24 HOURS
Qty: 7 TABLET | Refills: 0 | Status: CANCELLED | OUTPATIENT
Start: 2022-11-23 | End: 2022-11-30

## 2022-11-23 RX ORDER — DOXYCYCLINE HYCLATE 100 MG
100 TABLET ORAL 2 TIMES DAILY
Qty: 28 TABLET | Refills: 0 | Status: SHIPPED | OUTPATIENT
Start: 2022-11-23 | End: 2022-12-07

## 2022-11-28 ENCOUNTER — TELEPHONE (OUTPATIENT)
Dept: FAMILY MEDICINE CLINIC | Facility: HOSPITAL | Age: 37
End: 2022-11-28

## 2022-11-28 ENCOUNTER — ANTICOAG VISIT (OUTPATIENT)
Dept: FAMILY MEDICINE CLINIC | Facility: HOSPITAL | Age: 37
End: 2022-11-28

## 2022-11-28 LAB — INR PPP: 2.6 (ref 0.84–1.19)

## 2022-11-28 NOTE — TELEPHONE ENCOUNTER
Patient states that he was seen in office last week for URI  He was placed on Doxycycline  Patient states that he woke up early this morning and there was blood in his urine  He took his INR and it was 2 6, there has been no more blood in the urine since  Patient asking for advice

## 2022-12-06 ENCOUNTER — ANTICOAG VISIT (OUTPATIENT)
Dept: FAMILY MEDICINE CLINIC | Facility: HOSPITAL | Age: 37
End: 2022-12-06

## 2022-12-06 LAB — INR PPP: 2.8 (ref 0.84–1.19)

## 2022-12-26 LAB — INR PPP: 2.6 (ref 0.84–1.19)

## 2022-12-27 ENCOUNTER — ANTICOAG VISIT (OUTPATIENT)
Dept: FAMILY MEDICINE CLINIC | Facility: HOSPITAL | Age: 37
End: 2022-12-27

## 2023-01-04 ENCOUNTER — OFFICE VISIT (OUTPATIENT)
Dept: FAMILY MEDICINE CLINIC | Facility: HOSPITAL | Age: 38
End: 2023-01-04

## 2023-01-04 VITALS
WEIGHT: 315 LBS | HEART RATE: 96 BPM | SYSTOLIC BLOOD PRESSURE: 150 MMHG | HEIGHT: 66 IN | OXYGEN SATURATION: 98 % | BODY MASS INDEX: 50.62 KG/M2 | DIASTOLIC BLOOD PRESSURE: 96 MMHG

## 2023-01-04 DIAGNOSIS — Z13.1 SCREENING FOR DIABETES MELLITUS: ICD-10-CM

## 2023-01-04 DIAGNOSIS — I10 ESSENTIAL HYPERTENSION: Primary | ICD-10-CM

## 2023-01-04 DIAGNOSIS — E66.01 MORBID OBESITY WITH BMI OF 50.0-59.9, ADULT (HCC): ICD-10-CM

## 2023-01-04 DIAGNOSIS — R73.01 IMPAIRED FASTING GLUCOSE: ICD-10-CM

## 2023-01-04 DIAGNOSIS — E78.49 OTHER HYPERLIPIDEMIA: ICD-10-CM

## 2023-01-04 DIAGNOSIS — Z13.0 SCREENING FOR IRON DEFICIENCY ANEMIA: ICD-10-CM

## 2023-01-04 RX ORDER — LISINOPRIL AND HYDROCHLOROTHIAZIDE 25; 20 MG/1; MG/1
1 TABLET ORAL DAILY
Qty: 90 TABLET | Refills: 3 | Status: SHIPPED | OUTPATIENT
Start: 2023-01-04

## 2023-01-04 NOTE — PROGRESS NOTES
[address] [facility] [address]    NAME: Leopold Leriche  AGE: 44 y o  SEX: male  : 1985   DATE: 2023     Assessment and Plan:      Diagnosis ICD-10-CM Associated Orders   1  Essential hypertension  I10 lisinopril-hydrochlorothiazide (PRINZIDE,ZESTORETIC) 20-25 MG per tablet      2  Morbid obesity with BMI of 50 0-59 9, adult (HCC)  E66 01 Lipid Panel with Direct LDL reflex    Z68 43       3  Other hyperlipidemia  E78 49 Lipid Panel with Direct LDL reflex      4  Impaired fasting glucose  R73 01 HEMOGLOBIN A1C W/ EAG ESTIMATION      5  Screening for iron deficiency anemia  Z13 0 CBC and differential      6  Screening for diabetes mellitus  Z13 1 Comprehensive metabolic panel        Screening & diagnostic bloodwork ordered, our office will reach out with results once obtained  Change Prinzide to 20-25 mg  Immunizations and preventive care screenings were discussed with patient today  Appropriate education was printed on patient's after visit summary  Counseling:  Alcohol/drug use: discussed moderation in alcohol intake, the recommendations for healthy alcohol use, and avoidance of illicit drug use  Dental Health: discussed importance of regular tooth brushing, flossing, and dental visits  Injury prevention: discussed safety/seat belts, safety helmets, smoke detectors, carbon dioxide detectors, and smoking near bedding or upholstery  Sexual health: discussed sexually transmitted diseases, partner selection, use of condoms, avoidance of unintended pregnancy, and contraceptive alternatives  · Exercise: the importance of regular exercise/physical activity was discussed  Recommend exercise 3-5 times per week for at least 30 minutes  Return in about 3 months (around 2023) for Recheck BP       Chief Complaint:     Chief Complaint   Patient presents with   • Physical Exam      History of Present Illness:     Adult Annual Physical   Patient here for a comprehensive physical exam  The patient reports no problems  Diet and Physical Activity  · Diet/Nutrition: Regular, trying to avoid pasta, and foods he doesn't like  · Exercise: walking and wants to get a bike  · No Drug or Tobacco use  Alcohol use - occasional    General Health  · Sleep: decent, nml schedule 8-5 at work  · Hearing: normal - bilateral   · Vision: no vision problems  · Dental: no dental visits for >1 year and brushes teeth twice daily   Health  · History of STDs?: no   ·  in April, one female partner, son 15 yo split time     Review of Systems:     Review of Systems   Constitutional: Negative for chills and fever  HENT: Positive for postnasal drip and rhinorrhea  Eyes: Negative for pain and itching  Respiratory: Negative for cough and shortness of breath  Cardiovascular: Negative for chest pain and palpitations  Past Medical History:     Past Medical History:   Diagnosis Date   • Cerebral thrombosis with cerebral infarction ([facility] [address] ) 10/1/2014   • COVID-19 10/13/2021   • COVID-19 10/13/2021   • Hypertension    • Other hyperlipidemia 10/3/2014   • Stroke [facility])       Past Surgical History:     Past Surgical History:   Procedure Laterality Date   • HERNIA REPAIR     • INGUINAL HERNIA REPAIR        Social History:     Social History     Socioeconomic History   • Marital status: Single     Spouse name: None   • Number of children: None   • Years of education: None   • Highest education level: None   Occupational History   • None   Tobacco Use   • Smoking status: Never   • Smokeless tobacco: Never   Vaping Use   • Vaping Use: Never used   Substance and Sexual Activity   • Alcohol use: Yes     Comment: occasional   • Drug use: No   • Sexual activity: Not Currently   Other Topics Concern   • None   Social History Narrative    Wears seatbelt    Regular dental care     Lives with girlfriend and his son  Feels safe at home      No living will        DENTAL CARE, OCCASIONALLY    LIVES WITH FAMILY    LIVING SITUATION: SUPPORTIVE AND  SAFE    NO ADVANCE DIRECTIVES     Social Determinants of Health     Financial Resource Strain: Not on file   Food Insecurity: Not on file   Transportation Needs: Not on file   Physical Activity: Not on file   Stress: Not on file   Social Connections: Not on file   Intimate Partner Violence: Not on file   Housing Stability: Not on file      Family History:     Family History   Problem Relation Age of Onset   • Cancer Mother    • Hypertension Mother    • Thyroid disease Mother    • Cancer Father    • Diabetes Father    • Hypertension Father    • Stroke Father    • Prostate cancer Other    • Substance Abuse Neg Hx         neg fam hx   • Mental illness Neg Hx         neg fam hx      Current Medications:     Current Outpatient Medications   Medication Sig Dispense Refill   • albuterol (ProAir HFA) 90 mcg/act inhaler Inhale 2 puffs every 6 (six) hours as needed for wheezing 1 each 2   • amLODIPine (NORVASC) 10 mg tablet TAKE 1 TABLET BY MOUTH EVERY DAY 90 tablet 3   • atorvastatin (LIPITOR) 20 mg tablet TAKE 1 TABLET BY MOUTH 3 TIMES A WEEK  36 tablet 10   • cetirizine (ZyrTEC) 10 mg tablet Take 1 tablet (10 mg total) by mouth daily 90 tablet 1   • lisinopril-hydrochlorothiazide (PRINZIDE,ZESTORETIC) 20-25 MG per tablet Take 1 tablet by mouth daily 90 tablet 3   • montelukast (SINGULAIR) 10 mg tablet Take 1 tablet (10 mg total) by mouth daily at bedtime 90 tablet 1   • spironolactone (ALDACTONE) 25 mg tablet TAKE 1 TABLET BY MOUTH EVERY DAY 90 tablet 3   • warfarin (COUMADIN) 5 mg tablet TAKE 2 TABLETS BY MOUTH EVERY DAY AS DIRECTED 180 tablet 1     No current facility-administered medications for this visit  Allergies:      Allergies   Allergen Reactions   • Amoxicillin    • Azithromycin    • Cefaclor    • Pollen Extract       Physical Exam:     /96   Pulse 96   Ht 5' 6" (1 676 m)   Wt (!) 171 kg (376 lb) SpO2 98%   BMI 60 69 kg/m²     Physical Exam  Vitals reviewed  Constitutional:       General: He is not in acute distress  Appearance: Normal appearance  He is obese  He is not ill-appearing  HENT:      Head: Normocephalic and atraumatic  Right Ear: Tympanic membrane, ear canal and external ear normal  There is no impacted cerumen  Left Ear: Tympanic membrane, ear canal and external ear normal  There is no impacted cerumen  Nose: Nose normal  No congestion or rhinorrhea  Mouth/Throat:      Mouth: Mucous membranes are moist       Pharynx: Oropharynx is clear  No oropharyngeal exudate or posterior oropharyngeal erythema  Eyes:      General: No scleral icterus  Right eye: No discharge  Left eye: No discharge  Conjunctiva/sclera: Conjunctivae normal    Cardiovascular:      Rate and Rhythm: Normal rate and regular rhythm  Pulses: Normal pulses  Heart sounds: Normal heart sounds  No murmur heard  No friction rub  No gallop  Pulmonary:      Effort: Pulmonary effort is normal  No respiratory distress  Breath sounds: Normal breath sounds  No stridor  No wheezing  Musculoskeletal:         General: Normal range of motion  Cervical back: Normal range of motion and neck supple  No rigidity or tenderness  Right lower leg: No edema  Left lower leg: No edema  Lymphadenopathy:      Cervical: No cervical adenopathy  Skin:     General: Skin is warm and dry  Capillary Refill: Capillary refill takes less than 2 seconds  Coloration: Skin is not jaundiced or pale  Neurological:      Mental Status: He is alert and oriented to person, place, and time  Gait: Gait normal    Psychiatric:         Mood and Affect: Mood normal          Behavior: Behavior normal          Thought Content:  Thought content normal          Judgment: Judgment normal         DO Riddhi Burger [address]

## 2023-01-17 ENCOUNTER — ANTICOAG VISIT (OUTPATIENT)
Dept: FAMILY MEDICINE CLINIC | Facility: HOSPITAL | Age: 38
End: 2023-01-17

## 2023-01-17 LAB — INR PPP: 3.4 (ref 0.84–1.19)

## 2023-01-26 ENCOUNTER — ANTICOAG VISIT (OUTPATIENT)
Dept: FAMILY MEDICINE CLINIC | Facility: HOSPITAL | Age: 38
End: 2023-01-26

## 2023-01-26 LAB — INR PPP: 2.7 (ref 0.84–1.19)

## 2023-02-08 ENCOUNTER — ANTICOAG VISIT (OUTPATIENT)
Dept: FAMILY MEDICINE CLINIC | Facility: HOSPITAL | Age: 38
End: 2023-02-08

## 2023-02-08 LAB — INR PPP: 4 (ref 0.84–1.19)

## 2023-02-09 ENCOUNTER — TELEPHONE (OUTPATIENT)
Dept: FAMILY MEDICINE CLINIC | Facility: HOSPITAL | Age: 38
End: 2023-02-09

## 2023-02-09 NOTE — TELEPHONE ENCOUNTER
Patient is really concerned about his INR  He states that it is high, he is not in therapeutic range and is concerned about possible stroke  He states that he  has been watching his green vegetable intake, and is taking is coumadin as instructed  He is wondering if stress can have an affect on his INR  He states that he has had some extra stressors lately, he is not sleeping well and over all just not feeling great  He is wondering if there is anything he can do about this  He is requesting to speak with you, he states that he is at work today and if he is unable to  it is ok to leave a message on his voicemail with your recommendations  He can be reached at [phone]

## 2023-02-16 ENCOUNTER — ANTICOAG VISIT (OUTPATIENT)
Dept: FAMILY MEDICINE CLINIC | Facility: HOSPITAL | Age: 38
End: 2023-02-16

## 2023-02-16 ENCOUNTER — TELEPHONE (OUTPATIENT)
Dept: FAMILY MEDICINE CLINIC | Facility: HOSPITAL | Age: 38
End: 2023-02-16

## 2023-02-16 LAB — INR PPP: 1.5 (ref 0.84–1.19)

## 2023-03-09 ENCOUNTER — ANTICOAG VISIT (OUTPATIENT)
Dept: FAMILY MEDICINE CLINIC | Facility: HOSPITAL | Age: 38
End: 2023-03-09

## 2023-03-09 ENCOUNTER — TELEPHONE (OUTPATIENT)
Dept: FAMILY MEDICINE CLINIC | Facility: HOSPITAL | Age: 38
End: 2023-03-09

## 2023-03-09 LAB — INR PPP: 1.8 (ref 0.84–1.19)

## 2023-03-21 ENCOUNTER — ANTICOAG VISIT (OUTPATIENT)
Dept: FAMILY MEDICINE CLINIC | Facility: HOSPITAL | Age: 38
End: 2023-03-21

## 2023-03-21 LAB — INR PPP: 1.5 (ref 0.84–1.19)

## 2023-03-28 ENCOUNTER — ANTICOAG VISIT (OUTPATIENT)
Dept: FAMILY MEDICINE CLINIC | Facility: HOSPITAL | Age: 38
End: 2023-03-28

## 2023-03-28 ENCOUNTER — TELEPHONE (OUTPATIENT)
Dept: OTHER | Facility: OTHER | Age: 38
End: 2023-03-28

## 2023-03-28 LAB — INR PPP: 2.2 (ref 0.84–1.19)

## 2023-03-28 NOTE — TELEPHONE ENCOUNTER
Per patient's phone call, His INR results from 3/27/23 is 2 2  He also wanted to let the Dr Reyna Prude that he took one of his son's adderrall this morning by mistake

## 2023-04-01 ENCOUNTER — NURSE TRIAGE (OUTPATIENT)
Dept: OTHER | Facility: OTHER | Age: 38
End: 2023-04-01

## 2023-04-01 NOTE — TELEPHONE ENCOUNTER
Patient called senthil, stated that he is at the [facility] for his bloodwork in [facility]  The [facility] doesn't have Rx orders  Triage RN faxed the Rx orders via [employer]  [facility] confirmed that Rx orders were received

## 2023-04-01 NOTE — TELEPHONE ENCOUNTER
"Regarding: Lab Orders  ----- Message from Piotr Hauser sent at 4/1/2023  8:06 AM EDT -----  Myrna Elaine am at [facility] in [facility] and they never received the fax for my lab orders  Please fax them over to [phone]  \"    "

## 2023-04-02 LAB
ALBUMIN SERPL-MCNC: 4.3 G/DL (ref 4–5)
ALBUMIN/GLOB SERPL: 1.5 {RATIO} (ref 1.2–2.2)
ALP SERPL-CCNC: 77 IU/L (ref 44–121)
ALT SERPL-CCNC: 30 IU/L (ref 0–44)
AST SERPL-CCNC: 23 IU/L (ref 0–40)
BASOPHILS # BLD AUTO: 0 X10E3/UL (ref 0–0.2)
BASOPHILS NFR BLD AUTO: 0 %
BILIRUB SERPL-MCNC: 0.6 MG/DL (ref 0–1.2)
BUN SERPL-MCNC: 13 MG/DL (ref 6–20)
BUN/CREAT SERPL: 14 (ref 9–20)
CALCIUM SERPL-MCNC: 9.2 MG/DL (ref 8.7–10.2)
CHLORIDE SERPL-SCNC: 99 MMOL/L (ref 96–106)
CHOLEST SERPL-MCNC: 152 MG/DL (ref 100–199)
CO2 SERPL-SCNC: 27 MMOL/L (ref 20–29)
CREAT SERPL-MCNC: 0.92 MG/DL (ref 0.76–1.27)
EGFR: 109 ML/MIN/1.73
EOSINOPHIL # BLD AUTO: 0.1 X10E3/UL (ref 0–0.4)
EOSINOPHIL NFR BLD AUTO: 1 %
ERYTHROCYTE [DISTWIDTH] IN BLOOD BY AUTOMATED COUNT: 14.2 % (ref 11.6–15.4)
EST. AVERAGE GLUCOSE BLD GHB EST-MCNC: 117 MG/DL
GLOBULIN SER-MCNC: 2.8 G/DL (ref 1.5–4.5)
GLUCOSE SERPL-MCNC: 102 MG/DL (ref 70–99)
HBA1C MFR BLD: 5.7 % (ref 4.8–5.6)
HCT VFR BLD AUTO: 50.3 % (ref 37.5–51)
HDLC SERPL-MCNC: 31 MG/DL
HGB BLD-MCNC: 16.7 G/DL (ref 13–17.7)
IMM GRANULOCYTES # BLD: 0 X10E3/UL (ref 0–0.1)
IMM GRANULOCYTES NFR BLD: 0 %
LDLC SERPL CALC-MCNC: 87 MG/DL (ref 0–99)
LDLC/HDLC SERPL: 2.8 RATIO (ref 0–3.6)
LYMPHOCYTES # BLD AUTO: 1.3 X10E3/UL (ref 0.7–3.1)
LYMPHOCYTES NFR BLD AUTO: 18 %
MCH RBC QN AUTO: 28 PG (ref 26.6–33)
MCHC RBC AUTO-ENTMCNC: 33.2 G/DL (ref 31.5–35.7)
MCV RBC AUTO: 84 FL (ref 79–97)
MONOCYTES # BLD AUTO: 0.8 X10E3/UL (ref 0.1–0.9)
MONOCYTES NFR BLD AUTO: 11 %
NEUTROPHILS # BLD AUTO: 4.7 X10E3/UL (ref 1.4–7)
NEUTROPHILS NFR BLD AUTO: 70 %
PLATELET # BLD AUTO: 257 X10E3/UL (ref 150–450)
POTASSIUM SERPL-SCNC: 3.4 MMOL/L (ref 3.5–5.2)
PROT SERPL-MCNC: 7.1 G/DL (ref 6–8.5)
RBC # BLD AUTO: 5.97 X10E6/UL (ref 4.14–5.8)
SL AMB VLDL CHOLESTEROL CALC: 34 MG/DL (ref 5–40)
SODIUM SERPL-SCNC: 141 MMOL/L (ref 134–144)
TRIGL SERPL-MCNC: 198 MG/DL (ref 0–149)
WBC # BLD AUTO: 6.9 X10E3/UL (ref 3.4–10.8)

## 2023-04-04 ENCOUNTER — OFFICE VISIT (OUTPATIENT)
Dept: FAMILY MEDICINE CLINIC | Facility: HOSPITAL | Age: 38
End: 2023-04-04

## 2023-04-04 VITALS
BODY MASS INDEX: 50.62 KG/M2 | OXYGEN SATURATION: 98 % | WEIGHT: 315 LBS | DIASTOLIC BLOOD PRESSURE: 55 MMHG | SYSTOLIC BLOOD PRESSURE: 122 MMHG | HEART RATE: 80 BPM | HEIGHT: 66 IN

## 2023-04-04 DIAGNOSIS — E78.49 OTHER HYPERLIPIDEMIA: ICD-10-CM

## 2023-04-04 DIAGNOSIS — L30.9 DERMATITIS: ICD-10-CM

## 2023-04-04 DIAGNOSIS — I10 ESSENTIAL HYPERTENSION: ICD-10-CM

## 2023-04-04 DIAGNOSIS — R73.01 IMPAIRED FASTING GLUCOSE: ICD-10-CM

## 2023-04-04 DIAGNOSIS — J45.909 UNCOMPLICATED ASTHMA, UNSPECIFIED ASTHMA SEVERITY, UNSPECIFIED WHETHER PERSISTENT: ICD-10-CM

## 2023-04-04 DIAGNOSIS — I63.9 CEREBROVASCULAR ACCIDENT (CVA), UNSPECIFIED MECHANISM (HCC): ICD-10-CM

## 2023-04-04 DIAGNOSIS — E87.6 HYPOKALEMIA: Primary | ICD-10-CM

## 2023-04-04 RX ORDER — SPIRONOLACTONE 25 MG/1
25 TABLET ORAL DAILY
Qty: 90 TABLET | Refills: 3 | Status: SHIPPED | OUTPATIENT
Start: 2023-04-04

## 2023-04-04 RX ORDER — LISINOPRIL AND HYDROCHLOROTHIAZIDE 25; 20 MG/1; MG/1
1 TABLET ORAL DAILY
Qty: 90 TABLET | Refills: 3 | Status: SHIPPED | OUTPATIENT
Start: 2023-04-04

## 2023-04-04 RX ORDER — ALBUTEROL SULFATE 90 UG/1
2 AEROSOL, METERED RESPIRATORY (INHALATION) EVERY 6 HOURS PRN
Qty: 18 G | Refills: 2 | Status: SHIPPED | OUTPATIENT
Start: 2023-04-04

## 2023-04-04 RX ORDER — WARFARIN SODIUM 5 MG/1
5 TABLET ORAL
Qty: 180 TABLET | Refills: 3 | Status: SHIPPED | OUTPATIENT
Start: 2023-04-04

## 2023-04-04 RX ORDER — ATORVASTATIN CALCIUM 20 MG/1
20 TABLET, FILM COATED ORAL 3 TIMES WEEKLY
Qty: 36 TABLET | Refills: 10 | Status: SHIPPED | OUTPATIENT
Start: 2023-04-05

## 2023-04-04 RX ORDER — AMLODIPINE BESYLATE 10 MG/1
10 TABLET ORAL DAILY
Qty: 90 TABLET | Refills: 3 | Status: SHIPPED | OUTPATIENT
Start: 2023-04-04

## 2023-04-04 NOTE — PROGRESS NOTES
"  [facility],     Assessment/Plan:      Diagnosis ICD-10-CM Associated Orders   1  Hypokalemia  W66 8 Basic metabolic panel     Basic metabolic panel      2  Cerebrovascular accident (CVA), unspecified mechanism ([address] )  I63 9 warfarin (COUMADIN) 5 mg tablet      3  Dermatitis  L30 9 albuterol (ProAir HFA) 90 mcg/act inhaler      4  Uncomplicated asthma, unspecified asthma severity, unspecified whether persistent  J45 909 albuterol (ProAir HFA) 90 mcg/act inhaler      5  Other hyperlipidemia  E78 49 atorvastatin (LIPITOR) 20 mg tablet      6  Essential hypertension  I10 amLODIPine (NORVASC) 10 mg tablet     lisinopril-hydrochlorothiazide (PRINZIDE,ZESTORETIC) 20-25 MG per tablet     spironolactone (ALDACTONE) 25 mg tablet      7  Impaired fasting glucose  R73 01 HEMOGLOBIN A1C W/ EAG ESTIMATION     HEMOGLOBIN A1C W/ EAG ESTIMATION        • Labs prior to next appt  Reviewed last week's labs  • Add bananas into routine, watch for cramping, palpitations, etc    • Patient's medications were reviewed and reconciled  Ordered/Re-ordered as above   o No med changes at this time  BP much better  Return in about 4 months (around 8/4/2023) for F/U Chronic Conditions, Recheck BP  • Patient may call or return to office with any questions or concerns  ______________________________________________________________________  Subjective:     Patient ID: Ricarda Trujillo is a 43 y o  male  HPI  Ricarda Trujillo  Chief Complaint   Patient presents with   • Follow-up     labs     Labs done on 4/1/23 - A1c 6 1 now 5 7   CBC normal, TG's   Last visit - \"Change Prinzide to 20-25 mg \"    Feet feeling better now  Getting feet up more during bedtime  Wt Readings from Last 3 Encounters:   04/04/23 (!) 166 kg (367 lb)   01/04/23 (!) 171 kg (376 lb)   11/21/22 (!) 172 kg (379 lb)   Walking with son & playing more activities with him  15 yo son  INR 3/27/23 - q2 weeks or every other week   Per work " guidelines  BMI Counseling: Body mass index is 59 24 kg/m²  The BMI is above normal  Nutrition recommendations include decreasing portion sizes and encouraging healthy choices of fruits and vegetables  Exercise recommendations include moderate physical activity 150 minutes/week, exercising 3-5 times per week and strength training exercises  Rationale for BMI follow-up plan is due to patient being overweight or obese  The following portions of the patient's history were reviewed and updated as appropriate: allergies, current medications, past medical history, and problem list     Review of Systems   Constitutional: Negative for chills and fever  Respiratory: Negative for cough and shortness of breath  Cardiovascular: Negative for chest pain and palpitations  Neurological: Negative for light-headedness and headaches  Objective:      Vitals:    04/04/23 0740   BP: 122/55   Pulse: 80   SpO2: 98%      Physical Exam  Vitals reviewed  Constitutional:       General: He is not in acute distress  Appearance: Normal appearance  He is well-developed  He is obese  He is not ill-appearing  HENT:      Head: Normocephalic and atraumatic  Eyes:      General: No scleral icterus  Right eye: No discharge  Left eye: No discharge  Cardiovascular:      Rate and Rhythm: Normal rate and regular rhythm  Pulses: Normal pulses  Heart sounds: Normal heart sounds  No murmur heard  No friction rub  Pulmonary:      Effort: Pulmonary effort is normal  No respiratory distress  Breath sounds: Normal breath sounds  No stridor  No wheezing  Musculoskeletal:      Cervical back: Normal range of motion  No rigidity  Right lower leg: No edema  Left lower leg: No edema  Skin:     General: Skin is warm and dry  Neurological:      Mental Status: He is alert and oriented to person, place, and time        Gait: Gait normal    Psychiatric:         Mood and Affect: Mood normal  "      Behavior: Behavior normal          Thought Content: Thought content normal          Judgment: Judgment normal            Portions of the record may have been created with voice recognition software  Occasional wrong word or \"sound alike\" substitutions may have occurred due to the inherent limitations of voice recognition software  Please review the chart carefully and recognize, using context, where substitutions/typographical errors may have occurred       "

## 2023-04-25 ENCOUNTER — TELEPHONE (OUTPATIENT)
Dept: FAMILY MEDICINE CLINIC | Facility: HOSPITAL | Age: 38
End: 2023-04-25

## 2023-04-25 NOTE — TELEPHONE ENCOUNTER
Left message on med refill line, reports his pharmacy needs the orders for warfarin to be written differently  [facility] [place]

## 2023-04-26 ENCOUNTER — TELEPHONE (OUTPATIENT)
Dept: FAMILY MEDICINE CLINIC | Facility: HOSPITAL | Age: 38
End: 2023-04-26

## 2023-04-26 DIAGNOSIS — I63.9 CEREBROVASCULAR ACCIDENT (CVA), UNSPECIFIED MECHANISM (HCC): ICD-10-CM

## 2023-04-26 RX ORDER — WARFARIN SODIUM 5 MG/1
TABLET ORAL
Qty: 180 TABLET | Refills: 3 | Status: SHIPPED | OUTPATIENT
Start: 2023-04-26

## 2023-04-26 NOTE — TELEPHONE ENCOUNTER
Pt states rx for Warfarin was written differently  States he is now out and wants to know why it was changed   PCB

## 2023-04-26 NOTE — TELEPHONE ENCOUNTER
Talked to pt  His new rx for coum 5 mg was sent over as 1 daily, this will not cover what pt takes and the pharm will not fill it due to quantity  He always got the rx as --2 daily, which covers his directions as it changes  [facility] [facility]  [facility] with 3 refills

## 2023-05-02 ENCOUNTER — TELEPHONE (OUTPATIENT)
Dept: FAMILY MEDICINE CLINIC | Facility: HOSPITAL | Age: 38
End: 2023-05-02

## 2023-05-02 NOTE — TELEPHONE ENCOUNTER
Called to report his PT INR     2 6 as of yesterday  Please call to advise new instructions  Please a voicemail if he does not answer

## 2023-05-04 ENCOUNTER — ANTICOAG VISIT (OUTPATIENT)
Dept: FAMILY MEDICINE CLINIC | Facility: HOSPITAL | Age: 38
End: 2023-05-04

## 2023-05-04 LAB — INR PPP: 2.6 (ref 0.84–1.19)

## 2023-05-10 NOTE — TELEPHONE ENCOUNTER
[facility] called with results of Migel's INR last night  It was 2 8    He is taking 10mg M,W,F, 7 5 the rest  Detail Level: Generalized Detail Level: Detailed

## 2023-05-16 ENCOUNTER — ANTICOAG VISIT (OUTPATIENT)
Dept: FAMILY MEDICINE CLINIC | Facility: HOSPITAL | Age: 38
End: 2023-05-16

## 2023-05-16 LAB — INR PPP: 2.8 (ref 0.84–1.19)

## 2023-06-01 ENCOUNTER — ANTICOAG VISIT (OUTPATIENT)
Dept: FAMILY MEDICINE CLINIC | Facility: HOSPITAL | Age: 38
End: 2023-06-01

## 2023-06-01 LAB — INR PPP: 1.8 (ref 0.84–1.19)

## 2023-06-05 ENCOUNTER — TELEPHONE (OUTPATIENT)
Dept: FAMILY MEDICINE CLINIC | Facility: HOSPITAL | Age: 38
End: 2023-06-05

## 2023-06-05 NOTE — TELEPHONE ENCOUNTER
Missed 4 1/2 days of warfarin  Hoping it will be in tonight  Does he need to do anything different? Please call to advise

## 2023-06-05 NOTE — TELEPHONE ENCOUNTER
Pt states pharmacy was out of warfarin, the last dose he took was Saturday 2 5mg  [facility]  I advised pt to restart on same schedule  10mg tonite, 7 5 mg tues  10mg Wed, Get INR Thursday  Left detailed message for pt   CR

## 2023-06-14 ENCOUNTER — ANTICOAG VISIT (OUTPATIENT)
Dept: FAMILY MEDICINE CLINIC | Facility: HOSPITAL | Age: 38
End: 2023-06-14

## 2023-06-14 LAB — SL AMB POCT INR: 2.3

## 2023-06-27 ENCOUNTER — ANTICOAG VISIT (OUTPATIENT)
Dept: FAMILY MEDICINE CLINIC | Facility: HOSPITAL | Age: 38
End: 2023-06-27

## 2023-06-27 LAB — INR PPP: 2.3 (ref 0.84–1.19)

## 2023-07-11 ENCOUNTER — ANTICOAG VISIT (OUTPATIENT)
Dept: FAMILY MEDICINE CLINIC | Facility: HOSPITAL | Age: 38
End: 2023-07-11

## 2023-07-11 LAB — INR PPP: 3.4 (ref 0.84–1.19)

## 2023-07-18 ENCOUNTER — ANTICOAG VISIT (OUTPATIENT)
Dept: FAMILY MEDICINE CLINIC | Facility: HOSPITAL | Age: 38
End: 2023-07-18

## 2023-07-18 LAB — INR PPP: 2.6 (ref 0.84–1.19)

## 2023-08-01 ENCOUNTER — ANTICOAG VISIT (OUTPATIENT)
Dept: FAMILY MEDICINE CLINIC | Facility: HOSPITAL | Age: 38
End: 2023-08-01

## 2023-08-01 LAB — INR PPP: 3.4 (ref 0.84–1.19)

## 2023-08-03 NOTE — TELEPHONE ENCOUNTER
Handled dk Update History & Physical    The patient's History and Physical of August 2, 2023 was reviewed with the patient and I examined the patient. There was no change. The surgical site was confirmed by the patient and me. Plan: The risks, benefits, expected outcome, and alternative to the recommended procedure have been discussed with the patient. Patient understands and wants to proceed with the procedure. Proceed with colonoscopy today.     Electronically signed by Cristopher Urrutia MD on 8/3/2023 at 8:12 AM

## 2023-08-08 ENCOUNTER — ANTICOAG VISIT (OUTPATIENT)
Dept: FAMILY MEDICINE CLINIC | Facility: HOSPITAL | Age: 38
End: 2023-08-08

## 2023-08-08 LAB — INR PPP: 2.3 (ref 0.84–1.19)

## 2023-08-22 ENCOUNTER — ANTICOAG VISIT (OUTPATIENT)
Dept: FAMILY MEDICINE CLINIC | Facility: HOSPITAL | Age: 38
End: 2023-08-22

## 2023-08-22 LAB — INR PPP: 2.3 (ref 0.84–1.19)

## 2023-09-05 ENCOUNTER — ANTICOAG VISIT (OUTPATIENT)
Dept: FAMILY MEDICINE CLINIC | Facility: HOSPITAL | Age: 38
End: 2023-09-05

## 2023-09-05 LAB — INR PPP: 2.4 (ref 0.84–1.19)

## 2023-09-20 ENCOUNTER — ANTICOAG VISIT (OUTPATIENT)
Dept: FAMILY MEDICINE CLINIC | Facility: HOSPITAL | Age: 38
End: 2023-09-20

## 2023-09-20 LAB — INR PPP: 2.1 (ref 0.84–1.19)

## 2023-10-03 ENCOUNTER — ANTICOAG VISIT (OUTPATIENT)
Dept: FAMILY MEDICINE CLINIC | Facility: HOSPITAL | Age: 38
End: 2023-10-03

## 2023-10-03 LAB — INR PPP: 1.6 (ref 0.84–1.19)

## 2023-11-01 ENCOUNTER — TELEPHONE (OUTPATIENT)
Dept: FAMILY MEDICINE CLINIC | Facility: HOSPITAL | Age: 38
End: 2023-11-01

## 2023-11-01 ENCOUNTER — ANTICOAG VISIT (OUTPATIENT)
Dept: FAMILY MEDICINE CLINIC | Facility: HOSPITAL | Age: 38
End: 2023-11-01

## 2023-11-01 LAB — SL AMB POCT INR: 3.3

## 2023-11-01 NOTE — TELEPHONE ENCOUNTER
Per voicemail 10:40am    This is Debra Copping calling my doctor's doctor, fly just calling to record my PT INR. It was 3/3 and I took it this Monday. Apparently they're not going through when I call them through, so I'm just giving you guys a heads up. Ceci, my number is [phone] Thank you. Goodbye.

## 2023-11-14 ENCOUNTER — ANTICOAG VISIT (OUTPATIENT)
Dept: FAMILY MEDICINE CLINIC | Facility: HOSPITAL | Age: 38
End: 2023-11-14

## 2023-11-14 LAB — INR PPP: 2.4 (ref 0.84–1.19)

## 2023-11-28 ENCOUNTER — ANTICOAG VISIT (OUTPATIENT)
Dept: FAMILY MEDICINE CLINIC | Facility: HOSPITAL | Age: 38
End: 2023-11-28

## 2023-11-28 LAB — INR PPP: 2 (ref 0.84–1.19)

## 2023-12-13 ENCOUNTER — ANTICOAG VISIT (OUTPATIENT)
Dept: FAMILY MEDICINE CLINIC | Facility: HOSPITAL | Age: 38
End: 2023-12-13

## 2023-12-13 LAB — SL AMB POCT INR: 2

## 2023-12-28 ENCOUNTER — ANTICOAG VISIT (OUTPATIENT)
Dept: FAMILY MEDICINE CLINIC | Facility: HOSPITAL | Age: 38
End: 2023-12-28

## 2023-12-28 LAB — INR PPP: 2.6 (ref 0.84–1.19)

## 2024-01-30 ENCOUNTER — TELEPHONE (OUTPATIENT)
Dept: FAMILY MEDICINE CLINIC | Facility: HOSPITAL | Age: 39
End: 2024-01-30

## 2024-01-30 ENCOUNTER — ANTICOAG VISIT (OUTPATIENT)
Dept: FAMILY MEDICINE CLINIC | Facility: HOSPITAL | Age: 39
End: 2024-01-30

## 2024-01-30 LAB — INR PPP: 2.4 (ref 0.84–1.19)

## 2024-02-14 ENCOUNTER — ANTICOAG VISIT (OUTPATIENT)
Dept: FAMILY MEDICINE CLINIC | Facility: HOSPITAL | Age: 39
End: 2024-02-14

## 2024-02-14 LAB — INR PPP: 2.2 (ref 0.84–1.19)

## 2024-02-27 ENCOUNTER — ANTICOAG VISIT (OUTPATIENT)
Dept: FAMILY MEDICINE CLINIC | Facility: HOSPITAL | Age: 39
End: 2024-02-27

## 2024-02-27 LAB — INR PPP: 2.6 (ref 0.84–1.19)

## 2024-03-12 ENCOUNTER — ANTICOAG VISIT (OUTPATIENT)
Dept: FAMILY MEDICINE CLINIC | Facility: HOSPITAL | Age: 39
End: 2024-03-12

## 2024-03-12 LAB — INR PPP: 2.5 (ref 0.84–1.19)

## 2024-03-27 ENCOUNTER — ANTICOAG VISIT (OUTPATIENT)
Dept: FAMILY MEDICINE CLINIC | Facility: HOSPITAL | Age: 39
End: 2024-03-27

## 2024-03-27 LAB — SL AMB POCT INR: 1.9

## 2024-04-10 ENCOUNTER — ANTICOAG VISIT (OUTPATIENT)
Dept: FAMILY MEDICINE CLINIC | Facility: HOSPITAL | Age: 39
End: 2024-04-10

## 2024-04-10 LAB — INR PPP: 2.2 (ref 0.84–1.19)

## 2024-04-12 DIAGNOSIS — I10 ESSENTIAL HYPERTENSION: ICD-10-CM

## 2024-04-12 RX ORDER — SPIRONOLACTONE 25 MG/1
25 TABLET ORAL DAILY
Qty: 30 TABLET | Refills: 11 | Status: SHIPPED | OUTPATIENT
Start: 2024-04-12

## 2024-04-12 RX ORDER — LISINOPRIL AND HYDROCHLOROTHIAZIDE 25; 20 MG/1; MG/1
1 TABLET ORAL DAILY
Qty: 30 TABLET | Refills: 11 | Status: SHIPPED | OUTPATIENT
Start: 2024-04-12

## 2024-04-12 RX ORDER — AMLODIPINE BESYLATE 10 MG/1
10 TABLET ORAL DAILY
Qty: 30 TABLET | Refills: 11 | Status: SHIPPED | OUTPATIENT
Start: 2024-04-12

## 2024-04-23 ENCOUNTER — ANTICOAG VISIT (OUTPATIENT)
Dept: FAMILY MEDICINE CLINIC | Facility: HOSPITAL | Age: 39
End: 2024-04-23

## 2024-04-23 LAB — INR PPP: 1.9 (ref 0.84–1.19)

## 2024-05-07 ENCOUNTER — ANTICOAG VISIT (OUTPATIENT)
Dept: FAMILY MEDICINE CLINIC | Facility: HOSPITAL | Age: 39
End: 2024-05-07

## 2024-05-07 LAB — INR PPP: 2.5 (ref 0.84–1.19)

## 2024-05-21 ENCOUNTER — ANTICOAG VISIT (OUTPATIENT)
Dept: FAMILY MEDICINE CLINIC | Facility: HOSPITAL | Age: 39
End: 2024-05-21

## 2024-05-21 LAB — INR PPP: 2.2 (ref 0.84–1.19)

## 2024-05-26 DIAGNOSIS — I10 ESSENTIAL HYPERTENSION: ICD-10-CM

## 2024-05-28 RX ORDER — SPIRONOLACTONE 25 MG/1
25 TABLET ORAL DAILY
Qty: 90 TABLET | Refills: 4 | Status: SHIPPED | OUTPATIENT
Start: 2024-05-28

## 2024-05-30 DIAGNOSIS — I63.9 CEREBROVASCULAR ACCIDENT (CVA), UNSPECIFIED MECHANISM (HCC): ICD-10-CM

## 2024-05-31 RX ORDER — WARFARIN SODIUM 5 MG/1
TABLET ORAL
Qty: 60 TABLET | Refills: 11 | Status: SHIPPED | OUTPATIENT
Start: 2024-05-31

## 2024-06-05 ENCOUNTER — ANTICOAG VISIT (OUTPATIENT)
Dept: FAMILY MEDICINE CLINIC | Facility: HOSPITAL | Age: 39
End: 2024-06-05

## 2024-06-05 LAB — INR PPP: 2.8 (ref 0.84–1.19)

## 2024-06-24 LAB — INR PPP: 2.7 (ref 0.84–1.19)

## 2024-06-25 ENCOUNTER — ANTICOAG VISIT (OUTPATIENT)
Dept: FAMILY MEDICINE CLINIC | Facility: HOSPITAL | Age: 39
End: 2024-06-25

## 2024-06-25 ENCOUNTER — OFFICE VISIT (OUTPATIENT)
Dept: FAMILY MEDICINE CLINIC | Facility: HOSPITAL | Age: 39
End: 2024-06-25
Payer: COMMERCIAL

## 2024-06-25 VITALS
OXYGEN SATURATION: 98 % | HEIGHT: 66 IN | TEMPERATURE: 97.9 F | BODY MASS INDEX: 50.62 KG/M2 | SYSTOLIC BLOOD PRESSURE: 138 MMHG | HEART RATE: 84 BPM | DIASTOLIC BLOOD PRESSURE: 80 MMHG | WEIGHT: 315 LBS

## 2024-06-25 DIAGNOSIS — R73.01 IMPAIRED FASTING GLUCOSE: ICD-10-CM

## 2024-06-25 DIAGNOSIS — E66.01 MORBID OBESITY WITH BMI OF 50.0-59.9, ADULT (HCC): ICD-10-CM

## 2024-06-25 DIAGNOSIS — J06.9 URI WITH COUGH AND CONGESTION: Primary | ICD-10-CM

## 2024-06-25 DIAGNOSIS — J30.2 SEASONAL ALLERGIES: ICD-10-CM

## 2024-06-25 DIAGNOSIS — E78.49 OTHER HYPERLIPIDEMIA: ICD-10-CM

## 2024-06-25 DIAGNOSIS — Z13.29 SCREENING FOR THYROID DISORDER: ICD-10-CM

## 2024-06-25 DIAGNOSIS — E87.6 HYPOKALEMIA: ICD-10-CM

## 2024-06-25 DIAGNOSIS — Z13.0 SCREENING FOR IRON DEFICIENCY ANEMIA: ICD-10-CM

## 2024-06-25 DIAGNOSIS — I10 ESSENTIAL HYPERTENSION: ICD-10-CM

## 2024-06-25 PROCEDURE — 99214 OFFICE O/P EST MOD 30 MIN: CPT | Performed by: STUDENT IN AN ORGANIZED HEALTH CARE EDUCATION/TRAINING PROGRAM

## 2024-06-25 RX ORDER — IPRATROPIUM BROMIDE 21 UG/1
2 SPRAY, METERED NASAL EVERY 12 HOURS
Qty: 30 ML | Refills: 0 | Status: SHIPPED | OUTPATIENT
Start: 2024-06-25

## 2024-06-25 RX ORDER — DOXYCYCLINE HYCLATE 100 MG
100 TABLET ORAL 2 TIMES DAILY
Qty: 14 TABLET | Refills: 0 | Status: SHIPPED | OUTPATIENT
Start: 2024-06-25 | End: 2024-07-02

## 2024-06-25 RX ORDER — CETIRIZINE HYDROCHLORIDE 10 MG/1
10 TABLET ORAL DAILY
COMMUNITY

## 2024-06-25 NOTE — PROGRESS NOTES
[facility]   [place] DO    Assessment/Plan:      Diagnosis ICD-10-CM Associated Orders   1. URI with cough and congestion  J06.9 ipratropium (ATROVENT) 0.03 % nasal spray     doxycycline hyclate (VIBRA-TABS) 100 mg tablet      2. Essential hypertension  I10 Lipid Panel with Direct LDL reflex     Lipid Panel with Direct LDL reflex      3. Impaired fasting glucose  R73.01 Hemoglobin A1C     Hemoglobin A1C      4. Other hyperlipidemia  E78.49 Lipid Panel with Direct LDL reflex     High sensitivity CRP     Lipid Panel with Direct LDL reflex     High sensitivity CRP      5. Hypokalemia  E87.6 Comprehensive metabolic panel     Comprehensive metabolic panel      6. Screening for iron deficiency anemia  Z13.0 CBC and differential     CBC and differential      7. Screening for thyroid disorder  Z13.29 TSH, 3rd generation with Free T4 reflex     TSH, 3rd generation with Free T4 reflex      8. Morbid obesity with BMI of 50.0-59.9, adult (HCC)  E66.01 High sensitivity CRP    Z68.43 High sensitivity CRP      9. Seasonal allergies  J30.2 ipratropium (ATROVENT) 0.03 % nasal spray        Start doxy for URI & cough, due to allergies avoid PCN & Zpak.   C/W allergy meds. [employer] Green lid, likely zyrtec.   OTC options reviewed with patient. Adequate rest, proper water hydration, Vit C, D, Zinc, tea with honey, mucinex, cough drops, steam shower, nasal rinses, etc.   Heading to do fasting labs today after appt.   Work note given.   Return if symptoms worsen or fail to improve - Sept as scheduled.  Patient may call or return to office with any questions or concerns.   ______________________________________________________________________  Subjective:     Patient ID: Migel Wang is a 39 y.o. male.    Migel Wang  Chief Complaint   Patient presents with   • Cough     Cough(dry cough)  Sore throat, ear pain and diarrhea.  Started last Friday.      Scratchy throat, then Saturday got worse.   Used throat spray &  cough drops.     Now getting more ear pains, & cough can be wet or dry.   No phlegm coming up.   A few times needed his inhaler, not too bad though.     Mom had been sick first possibly.   She was at [facility].   Wife not sick.     OTC allergy meds daily for months now.     Didn't yet take BP meds. Will when he gets home. [place] too tired.     Wt Readings from Last 3 Encounters:   06/25/24 (!) 169 kg (373 lb)   04/04/23 (!) 166 kg (367 lb)   01/04/23 (!) 171 kg (376 lb)     Temp Readings from Last 3 Encounters:   06/25/24 97.9 °F (36.6 °C) (Tympanic)   11/21/22 99.8 °F (37.7 °C)   10/14/21 (!) 100.6 °F (38.1 °C) (Tympanic)     BP Readings from Last 3 Encounters:   06/25/24 138/80   04/04/23 122/55   01/04/23 150/96     Pulse Readings from Last 3 Encounters:   06/25/24 84   04/04/23 80   01/04/23 96          The following portions of the patient's history were reviewed and updated as appropriate: allergies, current medications, past medical history, and problem list.    Review of Systems   Constitutional:  Positive for fatigue and fever. Negative for appetite change, chills and diaphoresis.   HENT:  Positive for congestion, ear pain, sinus pressure, sore throat and voice change (hoarse). Negative for postnasal drip, rhinorrhea and sinus pain.    Eyes:  Negative for pain and redness.   Respiratory:  Positive for cough and chest tightness (x1). Negative for shortness of breath and wheezing.    Cardiovascular:  Negative for chest pain and palpitations.   Gastrointestinal:  Positive for diarrhea (on and off, some today again). Negative for constipation, nausea and vomiting.   Genitourinary:  Negative for dysuria and frequency.   Musculoskeletal:  Negative for myalgias.   Neurological:  Positive for headaches (occas). Negative for dizziness and light-headedness.       Objective:      Vitals:    06/25/24 1105   BP: 138/80   Pulse: 84   Temp: 97.9 °F (36.6 °C)   SpO2: 98%      Physical Exam  Vitals and nursing note reviewed.  "  Constitutional:       General: He is not in acute distress.     Appearance: Normal appearance. He is obese. He is not ill-appearing.   HENT:      Head: Normocephalic and atraumatic.      Right Ear: Ear canal and external ear normal. There is no impacted cerumen.      Left Ear: Ear canal and external ear normal. There is no impacted cerumen.      Ears:      Comments: Mucoid TM's      Nose: Congestion present.      Mouth/Throat:      Mouth: Mucous membranes are moist.      Pharynx: Oropharynx is clear. Posterior oropharyngeal erythema (mild) present. No oropharyngeal exudate.   Eyes:      General: No scleral icterus.        Right eye: No discharge.         Left eye: No discharge.   Cardiovascular:      Rate and Rhythm: Normal rate and regular rhythm.      Pulses: Normal pulses.      Heart sounds: Normal heart sounds. No murmur heard.  Pulmonary:      Effort: Pulmonary effort is normal. No respiratory distress.      Breath sounds: Normal breath sounds. No stridor. No wheezing.   Musculoskeletal:      Cervical back: Normal range of motion and neck supple. No rigidity or tenderness.      Right lower leg: No edema.      Left lower leg: No edema.   Lymphadenopathy:      Cervical: No cervical adenopathy.   Neurological:      Mental Status: He is alert and oriented to person, place, and time.      Gait: Gait normal.   Psychiatric:         Mood and Affect: Mood normal.         Behavior: Behavior normal.         Thought Content: Thought content normal.         Judgment: Judgment normal.           Portions of the record may have been created with voice recognition software. Occasional wrong word or \"sound alike\" substitutions may have occurred due to the inherent limitations of voice recognition software. Please review the chart carefully and recognize, using context, where substitutions/typographical errors may have occurred.     "

## 2024-06-26 LAB
ALBUMIN SERPL-MCNC: 3.8 G/DL (ref 4.1–5.1)
ALP SERPL-CCNC: 74 IU/L (ref 44–121)
ALT SERPL-CCNC: 28 IU/L (ref 0–44)
AST SERPL-CCNC: 19 IU/L (ref 0–40)
BASOPHILS # BLD AUTO: 0 X10E3/UL (ref 0–0.2)
BASOPHILS NFR BLD AUTO: 0 %
BILIRUB SERPL-MCNC: 0.2 MG/DL (ref 0–1.2)
BUN SERPL-MCNC: 10 MG/DL (ref 6–24)
BUN/CREAT SERPL: 11 (ref 9–20)
CALCIUM SERPL-MCNC: 8.3 MG/DL (ref 8.7–10.2)
CHLORIDE SERPL-SCNC: 103 MMOL/L (ref 96–106)
CHOLEST SERPL-MCNC: 131 MG/DL (ref 100–199)
CO2 SERPL-SCNC: 23 MMOL/L (ref 20–29)
CREAT SERPL-MCNC: 0.93 MG/DL (ref 0.76–1.27)
CRP SERPL HS-MCNC: 7.04 MG/L (ref 0–3)
EGFR: 106 ML/MIN/1.73
EOSINOPHIL # BLD AUTO: 0.2 X10E3/UL (ref 0–0.4)
EOSINOPHIL NFR BLD AUTO: 1 %
ERYTHROCYTE [DISTWIDTH] IN BLOOD BY AUTOMATED COUNT: 13.9 % (ref 11.6–15.4)
EST. AVERAGE GLUCOSE BLD GHB EST-MCNC: 123 MG/DL
GLOBULIN SER-MCNC: 2.8 G/DL (ref 1.5–4.5)
GLUCOSE SERPL-MCNC: 97 MG/DL (ref 70–99)
HBA1C MFR BLD: 5.9 % (ref 4.8–5.6)
HCT VFR BLD AUTO: 46.8 % (ref 37.5–51)
HDLC SERPL-MCNC: 31 MG/DL
HGB BLD-MCNC: 15.4 G/DL (ref 13–17.7)
IMM GRANULOCYTES # BLD: 0.1 X10E3/UL (ref 0–0.1)
IMM GRANULOCYTES NFR BLD: 1 %
LDLC SERPL CALC-MCNC: 74 MG/DL (ref 0–99)
LDLC/HDLC SERPL: 2.4 RATIO (ref 0–3.6)
LYMPHOCYTES # BLD AUTO: 1.4 X10E3/UL (ref 0.7–3.1)
LYMPHOCYTES NFR BLD AUTO: 13 %
MCH RBC QN AUTO: 28.3 PG (ref 26.6–33)
MCHC RBC AUTO-ENTMCNC: 32.9 G/DL (ref 31.5–35.7)
MCV RBC AUTO: 86 FL (ref 79–97)
MONOCYTES # BLD AUTO: 1 X10E3/UL (ref 0.1–0.9)
MONOCYTES NFR BLD AUTO: 9 %
NEUTROPHILS # BLD AUTO: 8.1 X10E3/UL (ref 1.4–7)
NEUTROPHILS NFR BLD AUTO: 76 %
PLATELET # BLD AUTO: 259 X10E3/UL (ref 150–450)
POTASSIUM SERPL-SCNC: 3.6 MMOL/L (ref 3.5–5.2)
PROT SERPL-MCNC: 6.6 G/DL (ref 6–8.5)
RBC # BLD AUTO: 5.45 X10E6/UL (ref 4.14–5.8)
SL AMB VLDL CHOLESTEROL CALC: 26 MG/DL (ref 5–40)
SODIUM SERPL-SCNC: 142 MMOL/L (ref 134–144)
TRIGL SERPL-MCNC: 148 MG/DL (ref 0–149)
TSH SERPL DL<=0.005 MIU/L-ACNC: 1.4 UIU/ML (ref 0.45–4.5)
WBC # BLD AUTO: 10.7 X10E3/UL (ref 3.4–10.8)

## 2024-06-27 ENCOUNTER — NURSE TRIAGE (OUTPATIENT)
Age: 39
End: 2024-06-27

## 2024-06-27 NOTE — TELEPHONE ENCOUNTER
" Patient called in to report he has a rash that started 6/26/24. He is unable to see most of the spots due to their location. He has 5 on the right side of abdomen, 1 in center of abdomen and 3-4 on left side abdomen. They are dime to sherlyn size, red and itchy (his wife can see them). He was put on antibiotic doxycycline 6/25/24 and has taken 5 doses. He does say he is outside a lot and unsure if they could be insect bites. He is using anti-itch cream and has taken benadryl. Additional home care advise given  He is at work currently. He reports his URI is improving. He still has sore throat but, cough is now productive. Denies fever or abdominal upset. Please advise      Reason for Disposition   Rash began within 4 hours of a new prescription medication    Answer Assessment - Initial Assessment Questions  1. APPEARANCE of RASH: \"Describe the rash.\" (e.g., spots, blisters, raised areas, skin peeling, scaly)      Nickel to dime size red, itchy bumps  2. SIZE: \"How big are the spots?\" (e.g., tip of pen, eraser, coin; inches, centimeters)      Nickel to dime size   3. LOCATION: \"Where is the rash located?\"      Right and left side and 1 spot middle of abdomen  4. COLOR: \"What color is the rash?\" (Note: It is difficult to assess rash color in people with darker-colored skin. When this situation occurs, simply ask the caller to describe what they see.)      red  5. ONSET: \"When did the rash begin?\"      6/26/24  6. FEVER: \"Do you have a fever?\" If Yes, ask: \"What is your temperature, how was it measured, and when did it start?\"      no  7. ITCHING: \"Does the rash itch?\" If Yes, ask: \"How bad is the itch?\" (Scale 1-10; or mild, moderate, severe)      yes  8. CAUSE: \"What do you think is causing the rash?\"      Medication or bug bites  9. MEDICATION FACTORS: \"Have you started any new medications within the last 2 weeks?\" (e.g., antibiotics)       Doxy  10. OTHER SYMPTOMS: \"Do you have any other symptoms?\" (e.g., dizziness, " headache, sore throat, joint pain)        Sore throat ongoing    Protocols used: Rash or Redness - Widespread-ADULT-[place]

## 2024-06-28 ENCOUNTER — TELEPHONE (OUTPATIENT)
Age: 39
End: 2024-06-28

## 2024-06-28 NOTE — TELEPHONE ENCOUNTER
Pt called stating that he called in yesterday regarding hives. He looked up the antibiotic and saw that hives can be a side effect. He didn't hear back from us so he took another dose thing morning. He also tried calling the pharmacy to check and no one responded.  He woke up with more hives this morning. He has aches and pains, feet and ankles hurt. No breathing issues.  Per chart, Dr. Jones said stop taking and have him coming in today. Pt states he will be unable to leave work due to staffing issues.     Please advise patient  [phone]

## 2024-06-28 NOTE — TELEPHONE ENCOUNTER
Pt called stating that he called in yesterday regarding hives. He looked up the antibiotic and saw that hives can be a side effect. He didn't hear back from us so he took another dose thing morning. He also tried calling the pharmacy to check and no one responded.  He woke up with more hives this morning. He has aches and pains, feet and ankles hurt. No breathing issues.  Per chart, Dr. Jones said stop taking and have him coming in today. Pt states he will be unable to leave work due to staffing issues.      Please advise patient  [phone]

## 2024-06-29 ENCOUNTER — TELEPHONE (OUTPATIENT)
Dept: FAMILY MEDICINE CLINIC | Facility: HOSPITAL | Age: 39
End: 2024-06-29

## 2024-06-30 DIAGNOSIS — I63.9 CEREBROVASCULAR ACCIDENT (CVA), UNSPECIFIED MECHANISM (HCC): ICD-10-CM

## 2024-06-30 RX ORDER — WARFARIN SODIUM 5 MG/1
TABLET ORAL
Qty: 180 TABLET | Refills: 1 | Status: SHIPPED | OUTPATIENT
Start: 2024-06-30

## 2024-07-09 ENCOUNTER — ANTICOAG VISIT (OUTPATIENT)
Dept: FAMILY MEDICINE CLINIC | Facility: HOSPITAL | Age: 39
End: 2024-07-09

## 2024-07-10 ENCOUNTER — TELEPHONE (OUTPATIENT)
Dept: FAMILY MEDICINE CLINIC | Facility: HOSPITAL | Age: 39
End: 2024-07-10

## 2024-07-10 ENCOUNTER — ANTICOAG VISIT (OUTPATIENT)
Dept: FAMILY MEDICINE CLINIC | Facility: HOSPITAL | Age: 39
End: 2024-07-10

## 2024-07-10 ENCOUNTER — TELEPHONE (OUTPATIENT)
Age: 39
End: 2024-07-10

## 2024-07-10 LAB — INR PPP: 2.9 (ref 0.84–1.19)

## 2024-07-10 NOTE — TELEPHONE ENCOUNTER
I just saw another message from this morning that pt got instructions.      Tried calling pt--MB has been full.  Needs coumadin instructions as stated.

## 2024-07-10 NOTE — TELEPHONE ENCOUNTER
Patient was calling back in regards to a call that he missed at 3:03, tried to transfer patient over to the office, but patient had hung up.     Please advise back to him, when possible.

## 2024-07-17 DIAGNOSIS — J06.9 URI WITH COUGH AND CONGESTION: ICD-10-CM

## 2024-07-17 DIAGNOSIS — J30.2 SEASONAL ALLERGIES: ICD-10-CM

## 2024-07-17 RX ORDER — IPRATROPIUM BROMIDE 21 UG/1
2 SPRAY, METERED NASAL EVERY 12 HOURS
Qty: 30 ML | Refills: 5 | Status: SHIPPED | OUTPATIENT
Start: 2024-07-17

## 2024-07-24 ENCOUNTER — ANTICOAG VISIT (OUTPATIENT)
Dept: FAMILY MEDICINE CLINIC | Facility: HOSPITAL | Age: 39
End: 2024-07-24

## 2024-07-24 LAB — INR PPP: 2.1 (ref 0.84–1.19)

## 2024-08-07 LAB — INR PPP: 2.1 (ref 0.85–1.19)

## 2024-08-08 ENCOUNTER — ANTICOAG VISIT (OUTPATIENT)
Dept: FAMILY MEDICINE CLINIC | Facility: HOSPITAL | Age: 39
End: 2024-08-08

## 2024-08-20 ENCOUNTER — ANTICOAG VISIT (OUTPATIENT)
Dept: FAMILY MEDICINE CLINIC | Facility: HOSPITAL | Age: 39
End: 2024-08-20

## 2024-08-20 LAB — INR PPP: 2.7 (ref 0.85–1.19)

## 2024-09-09 ENCOUNTER — ANTICOAG VISIT (OUTPATIENT)
Dept: FAMILY MEDICINE CLINIC | Facility: HOSPITAL | Age: 39
End: 2024-09-09

## 2024-09-09 LAB — INR PPP: 3.2 (ref 0.85–1.19)

## 2024-09-25 ENCOUNTER — ANTICOAG VISIT (OUTPATIENT)
Dept: FAMILY MEDICINE CLINIC | Facility: HOSPITAL | Age: 39
End: 2024-09-25

## 2024-09-25 ENCOUNTER — OFFICE VISIT (OUTPATIENT)
Dept: FAMILY MEDICINE CLINIC | Facility: HOSPITAL | Age: 39
End: 2024-09-25
Payer: COMMERCIAL

## 2024-09-25 VITALS
OXYGEN SATURATION: 99 % | DIASTOLIC BLOOD PRESSURE: 78 MMHG | HEIGHT: 66 IN | BODY MASS INDEX: 50.62 KG/M2 | HEART RATE: 103 BPM | SYSTOLIC BLOOD PRESSURE: 128 MMHG | WEIGHT: 315 LBS

## 2024-09-25 DIAGNOSIS — E87.6 HYPOKALEMIA: Primary | ICD-10-CM

## 2024-09-25 DIAGNOSIS — E78.49 OTHER HYPERLIPIDEMIA: ICD-10-CM

## 2024-09-25 DIAGNOSIS — R73.01 IMPAIRED FASTING GLUCOSE: ICD-10-CM

## 2024-09-25 DIAGNOSIS — Z00.00 ROUTINE ADULT HEALTH MAINTENANCE: ICD-10-CM

## 2024-09-25 DIAGNOSIS — I10 ESSENTIAL HYPERTENSION: ICD-10-CM

## 2024-09-25 DIAGNOSIS — E66.01 MORBID OBESITY WITH BMI OF 50.0-59.9, ADULT (HCC): ICD-10-CM

## 2024-09-25 DIAGNOSIS — R79.82 ELEVATED C-REACTIVE PROTEIN (CRP): ICD-10-CM

## 2024-09-25 DIAGNOSIS — K12.0 CANKER SORE: ICD-10-CM

## 2024-09-25 LAB — INR PPP: 2.9 (ref 0.85–1.19)

## 2024-09-25 PROCEDURE — 99214 OFFICE O/P EST MOD 30 MIN: CPT | Performed by: STUDENT IN AN ORGANIZED HEALTH CARE EDUCATION/TRAINING PROGRAM

## 2024-09-25 PROCEDURE — 99396 PREV VISIT EST AGE 40-64: CPT | Performed by: STUDENT IN AN ORGANIZED HEALTH CARE EDUCATION/TRAINING PROGRAM

## 2024-09-25 RX ORDER — ATORVASTATIN CALCIUM 20 MG/1
20 TABLET, FILM COATED ORAL DAILY
Qty: 90 TABLET | Refills: 1 | Status: SHIPPED | OUTPATIENT
Start: 2024-09-25

## 2024-09-25 RX ORDER — LIDOCAINE HYDROCHLORIDE 20 MG/ML
15 SOLUTION OROPHARYNGEAL 4 TIMES DAILY PRN
Qty: 100 ML | Refills: 0 | Status: SHIPPED | OUTPATIENT
Start: 2024-09-25

## 2024-09-25 NOTE — ASSESSMENT & PLAN NOTE
Recheck in [facility] labs. 5.9 in June, but has had some weight gain.   Orders:    Comprehensive metabolic panel; Future    Hemoglobin A1C; Future    Comprehensive metabolic panel    Hemoglobin A1C

## 2024-09-25 NOTE — PROGRESS NOTES
Adult Annual Physical  Name: Migel Wang      : 1985      MRN: [id number]  Encounter Provider: La Napoles DO  Encounter Date: 2024   Encounter department: [facility] PRIMARY CARE [address]    Assessment & Plan  Hypokalemia  Better this most recent set, will continue to monitor.   Orders:    Comprehensive metabolic panel; Future    Comprehensive metabolic panel    Morbid obesity with BMI of 50.0-59.9, adult (HCC)  Increase activity and walking.   LABWORK FASTING IN 4-6 weeks   Orders:    CT coronary calcium score; Future    High sensitivity CRP; Future    High sensitivity CRP    Essential hypertension  BP well controlled today, stable on current meds.        Impaired fasting glucose  Recheck in [facility] labs. 5.9 in , but has had some weight gain.   Orders:    Comprehensive metabolic panel; Future    Hemoglobin A1C; Future    Comprehensive metabolic panel    Hemoglobin A1C    Elevated C-reactive protein (CRP)  Repeat CRP due to concern for cardiac health & MI predictor. Will reassess & consider CT Calcium Score.   Orders:    CT coronary calcium score; Future    High sensitivity CRP; Future    High sensitivity CRP    Lipid Panel with Direct LDL reflex; Future    Lipid Panel with Direct LDL reflex    Other hyperlipidemia  Increase lipitor from 20 mg 3x a week to daily.   Orders:    CT coronary calcium score; Future    High sensitivity CRP; Future    atorvastatin (LIPITOR) 20 mg tablet; Take 1 tablet (20 mg total) by mouth daily    High sensitivity CRP    Lipid Panel with Direct LDL reflex; Future    Lipid Panel with Direct LDL reflex    Canker sore  Unknown etiology, but can use ice, cold drinks, chloroseptic & now trial viscous lido.   Orders:    Lidocaine Viscous HCl (XYLOCAINE) 2 % mucosal solution; Swish and spit 15 mL 4 (four) times a day as needed for mouth pain or discomfort    Routine adult health maintenance         Immunizations and preventive care screenings were discussed  with patient today. Appropriate education was printed on patient's after visit summary.    Discussed risks and benefits of prostate cancer screening. We discussed the controversial history of PSA screening for prostate cancer in the [place] as well as the risk of over detection and over treatment of prostate cancer by way of PSA screening.  The patient understands that PSA blood testing is an imperfect way to screen for prostate cancer and that elevated PSA levels in the blood may also be caused by infection, inflammation, prostatic trauma or manipulation, urological procedures, or by benign prostatic enlargement.    The role of the digital rectal examination in prostate cancer screening was also discussed and I discussed with him that there is large interobserver variability in the findings of digital rectal examination.    Counseling:  Alcohol/drug use: discussed moderation in alcohol intake, the recommendations for healthy alcohol use, and avoidance of illicit drug use.  Dental Health: discussed importance of regular tooth brushing, flossing, and dental visits.  Injury prevention: discussed safety/seat belts, safety helmets, smoke detectors, carbon monoxide detectors, and smoking near bedding or upholstery.  Sexual health: discussed sexually transmitted diseases, partner selection, use of condoms, avoidance of unintended pregnancy, and contraceptive alternatives.  Exercise: the importance of regular exercise/physical activity was discussed. Recommend exercise 3-5 times per week for at least 30 minutes.     Return in about 3 months (around 12/25/2024).    Depression Screening and Follow-up Plan: Patient was screened for depression during today's encounter. They screened negative with a PHQ-9 score of 2.      History of Present Illness     Adult Annual Physical:  Patient presents for annual physical. Traveled with family. Went to the beach, drove, not flight.   R ear pain started Friday.  Feels something is  in the back of the throat, noticed ulcer like thing in back of throat today.   Posterior R tonsillar area. No other sick contacts.  Feels like swallowing glass  .     Diet and Physical Activity:    - Exercise: walking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 sometimes wakes to urinate  - Hearing: normal hearing bilateral ears.  - Vision: no vision problems.  - Dental: brushes teeth twice daily and no dental visits for > 1 year.     Health:  - History of STDs: no.   - Urinary symptoms: erectile dysfunction.       Wt Readings from Last 3 Encounters:   09/25/24 (!) 172 kg (380 lb)   06/25/24 (!) 169 kg (373 lb)   04/04/23 (!) 166 kg (367 lb)     Temp Readings from Last 3 Encounters:   06/25/24 97.9 °F (36.6 °C) (Tympanic)   11/21/22 99.8 °F (37.7 °C)   10/14/21 (!) 100.6 °F (38.1 °C) (Tympanic)     BP Readings from Last 3 Encounters:   09/25/24 128/78   06/25/24 138/80   04/04/23 122/55     Pulse Readings from Last 3 Encounters:   09/25/24 103   06/25/24 84   04/04/23 80     Review of Systems   Constitutional:  Negative for chills and fever.   HENT:  Positive for ear pain, rhinorrhea (sometimes), sinus pressure, sore throat and voice change. Negative for sinus pain.    Eyes:  Negative for photophobia, pain and redness.   Respiratory:  Positive for cough (mucus last week before travels). Negative for shortness of breath.    Cardiovascular:  Negative for chest pain and palpitations.   Gastrointestinal:  Negative for diarrhea, nausea and vomiting.   Genitourinary:  Negative for frequency and hematuria.   Neurological:  Negative for dizziness, light-headedness and headaches.     Current Outpatient Medications on File Prior to Visit   Medication Sig Dispense Refill    albuterol (ProAir HFA) 90 mcg/act inhaler Inhale 2 puffs every 6 (six) hours as needed  "for wheezing 18 g 2    amLODIPine (NORVASC) 10 mg tablet TAKE 1 TABLET BY MOUTH EVERY DAY 30 tablet 11    cetirizine (ZyrTEC) 10 mg tablet Take 10 mg by mouth daily      ipratropium (ATROVENT) 0.03 % nasal spray SPRAY 2 SPRAYS INTO EACH NOSTRIL EVERY 12 HOURS. 30 mL 5    lisinopril-hydrochlorothiazide (PRINZIDE,ZESTORETIC) 20-25 MG per tablet TAKE 1 TABLET BY MOUTH EVERY DAY 30 tablet 11    spironolactone (ALDACTONE) 25 mg tablet TAKE 1 TABLET (25 MG TOTAL) BY MOUTH DAILY. 90 tablet 4    warfarin (COUMADIN) 5 mg tablet TAKE 1 TABLET BY MOUTH TWICE A  tablet 1    [DISCONTINUED] atorvastatin (LIPITOR) 20 mg tablet Take 1 tablet (20 mg total) by mouth 3 (three) times a week 36 tablet 10     No current facility-administered medications on file prior to visit.      Social History     Tobacco Use    Smoking status: Never    Smokeless tobacco: Never   Vaping Use    Vaping status: Never Used   Substance and Sexual Activity    Alcohol use: Yes     Comment: occasional    Drug use: No    Sexual activity: Not Currently     Objective     /78   Pulse 103   Ht 5' 6\" (1.676 m)   Wt (!) 172 kg (380 lb)   SpO2 99%   BMI 61.33 kg/m²     Physical Exam  Vitals reviewed.   Constitutional:       General: He is not in acute distress.     Appearance: Normal appearance. He is well-developed and well-groomed. He is morbidly obese. He is not ill-appearing.   HENT:      Head: Normocephalic and atraumatic.      Right Ear: Tympanic membrane, ear canal and external ear normal. There is no impacted cerumen.      Left Ear: Tympanic membrane, ear canal and external ear normal. There is no impacted cerumen.      Nose: Nose normal. No congestion or rhinorrhea.      Mouth/Throat:      Mouth: Mucous membranes are moist.      Pharynx: Oropharynx is clear. Posterior oropharyngeal erythema (ulcer, canker sore on upper R posterior pharynx, 1 cm round) present. No oropharyngeal exudate.   Eyes:      General: No scleral icterus.        Right " eye: No discharge.         Left eye: No discharge.      Conjunctiva/sclera: Conjunctivae normal.   Neck:      Comments: No thyroid nodules or thyromegaly.  Cardiovascular:      Rate and Rhythm: Normal rate and regular rhythm.      Pulses: Normal pulses.      Heart sounds: Normal heart sounds. No murmur heard.     No friction rub. No gallop.   Pulmonary:      Effort: Pulmonary effort is normal. No respiratory distress.      Breath sounds: Normal breath sounds. No stridor. No wheezing.   Musculoskeletal:         General: No swelling or tenderness. Normal range of motion.      Cervical back: Normal range of motion and neck supple. No rigidity or tenderness.      Right lower leg: No edema.      Left lower leg: No edema.   Lymphadenopathy:      Cervical: No cervical adenopathy.   Skin:     General: Skin is warm and dry.      Capillary Refill: Capillary refill takes less than 2 seconds.      Coloration: Skin is not jaundiced or pale.   Neurological:      Mental Status: He is alert and oriented to person, place, and time.      Gait: Gait normal.   Psychiatric:         Mood and Affect: Mood normal.         Behavior: Behavior normal. Behavior is cooperative.         Thought Content: Thought content normal.         Judgment: Judgment normal.

## 2024-09-25 NOTE — ASSESSMENT & PLAN NOTE
Increase activity and walking.   LABWORK FASTING IN 4-6 weeks   Orders:    CT coronary calcium score; Future    High sensitivity CRP; Future    High sensitivity CRP

## 2024-10-23 ENCOUNTER — ANTICOAG VISIT (OUTPATIENT)
Dept: FAMILY MEDICINE CLINIC | Facility: HOSPITAL | Age: 39
End: 2024-10-23

## 2024-10-23 LAB — INR PPP: 2.1 (ref 0.85–1.19)

## 2024-11-05 ENCOUNTER — ANTICOAG VISIT (OUTPATIENT)
Dept: FAMILY MEDICINE CLINIC | Facility: HOSPITAL | Age: 39
End: 2024-11-05

## 2024-11-05 LAB — INR PPP: 2.4 (ref 0.85–1.19)

## 2024-11-24 LAB
ALBUMIN SERPL-MCNC: 4.1 G/DL (ref 4.1–5.1)
ALP SERPL-CCNC: 70 IU/L (ref 44–121)
ALT SERPL-CCNC: 29 IU/L (ref 0–44)
AST SERPL-CCNC: 17 IU/L (ref 0–40)
BILIRUB SERPL-MCNC: 0.3 MG/DL (ref 0–1.2)
BUN SERPL-MCNC: 13 MG/DL (ref 6–24)
BUN/CREAT SERPL: 14 (ref 9–20)
CALCIUM SERPL-MCNC: 9.3 MG/DL (ref 8.7–10.2)
CHLORIDE SERPL-SCNC: 101 MMOL/L (ref 96–106)
CHOLEST SERPL-MCNC: 121 MG/DL (ref 100–199)
CO2 SERPL-SCNC: 25 MMOL/L (ref 20–29)
CREAT SERPL-MCNC: 0.9 MG/DL (ref 0.76–1.27)
CRP SERPL HS-MCNC: 3.2 MG/L (ref 0–3)
EGFR: 111 ML/MIN/1.73
EST. AVERAGE GLUCOSE BLD GHB EST-MCNC: 131 MG/DL
GLOBULIN SER-MCNC: 2.3 G/DL (ref 1.5–4.5)
GLUCOSE SERPL-MCNC: 112 MG/DL (ref 70–99)
HBA1C MFR BLD: 6.2 % (ref 4.8–5.6)
HDLC SERPL-MCNC: 34 MG/DL
LDLC SERPL CALC-MCNC: 59 MG/DL (ref 0–99)
LDLC/HDLC SERPL: 1.7 RATIO (ref 0–3.6)
POTASSIUM SERPL-SCNC: 3.6 MMOL/L (ref 3.5–5.2)
PROT SERPL-MCNC: 6.4 G/DL (ref 6–8.5)
SL AMB VLDL CHOLESTEROL CALC: 28 MG/DL (ref 5–40)
SODIUM SERPL-SCNC: 141 MMOL/L (ref 134–144)
TRIGL SERPL-MCNC: 163 MG/DL (ref 0–149)

## 2024-12-03 ENCOUNTER — RESULTS FOLLOW-UP (OUTPATIENT)
Dept: FAMILY MEDICINE CLINIC | Facility: HOSPITAL | Age: 39
End: 2024-12-03

## 2024-12-03 ENCOUNTER — ANTICOAG VISIT (OUTPATIENT)
Dept: FAMILY MEDICINE CLINIC | Facility: HOSPITAL | Age: 39
End: 2024-12-03

## 2024-12-03 LAB — INR PPP: 2.6 (ref 0.85–1.19)

## 2024-12-03 NOTE — TELEPHONE ENCOUNTER
Migel called back. I read message out to him. He does not have any questions. He will look into setting up [facility].

## 2024-12-03 NOTE — TELEPHONE ENCOUNTER
----- Message from La Napoles DO sent at 12/3/2024  8:48 AM EST -----  He does not have MyChart set up, please let him know the results of his blood work:   Metabolic panel normal kidney function, liver enzymes, and electrolytes.  Glucose slightly elevated which correlated to an A1c of 6.2.  This did go up from June, and we would like it to come back down given how young he is.  Usually through exercise, more water, and decrease carbs and sweets.  He is C-reactive protein did come down from 5 months ago, but is 3.2, and still elevated it does make me worried about possibly correlating with plaque burden in the heart.  I did order a CT calcium score before for him, and I would like him to get that done, if usually $99 out-of-pocket and he is to call central scheduling to make an appointment.  His lipid panel will be better from the past 2x, cholesterol 121, LDL 59, triglycerides borderline, and HDL for him is always not very good.  Some of it is genetics and some of it is diet and exercise. The Lipitor does help with these numbers.

## 2024-12-17 ENCOUNTER — RESULTS FOLLOW-UP (OUTPATIENT)
Dept: FAMILY MEDICINE CLINIC | Facility: HOSPITAL | Age: 39
End: 2024-12-17

## 2024-12-19 ENCOUNTER — ANTICOAG VISIT (OUTPATIENT)
Dept: FAMILY MEDICINE CLINIC | Facility: HOSPITAL | Age: 39
End: 2024-12-19

## 2024-12-19 LAB — INR PPP: 2.4 (ref 0.85–1.19)

## 2024-12-30 LAB — INR PPP: 1.9 (ref 0.85–1.19)

## 2024-12-31 ENCOUNTER — RESULTS FOLLOW-UP (OUTPATIENT)
Dept: FAMILY MEDICINE CLINIC | Facility: HOSPITAL | Age: 39
End: 2024-12-31

## 2024-12-31 ENCOUNTER — ANTICOAG VISIT (OUTPATIENT)
Dept: FAMILY MEDICINE CLINIC | Facility: HOSPITAL | Age: 39
End: 2024-12-31

## 2025-01-06 ENCOUNTER — OFFICE VISIT (OUTPATIENT)
Dept: FAMILY MEDICINE CLINIC | Facility: HOSPITAL | Age: 40
End: 2025-01-06
Payer: COMMERCIAL

## 2025-01-06 VITALS
HEART RATE: 117 BPM | SYSTOLIC BLOOD PRESSURE: 132 MMHG | DIASTOLIC BLOOD PRESSURE: 88 MMHG | HEIGHT: 66 IN | TEMPERATURE: 99.4 F | WEIGHT: 315 LBS | BODY MASS INDEX: 50.62 KG/M2 | OXYGEN SATURATION: 98 %

## 2025-01-06 DIAGNOSIS — K04.7 DENTAL INFECTION: Primary | ICD-10-CM

## 2025-01-06 DIAGNOSIS — Z79.01 CHRONIC ANTICOAGULATION: ICD-10-CM

## 2025-01-06 DIAGNOSIS — Z88.1 ALLERGY TO MULTIPLE ANTIBIOTICS: ICD-10-CM

## 2025-01-06 PROBLEM — U07.1 COVID-19: Status: RESOLVED | Noted: 2021-10-13 | Resolved: 2025-01-06

## 2025-01-06 PROCEDURE — 99214 OFFICE O/P EST MOD 30 MIN: CPT | Performed by: INTERNAL MEDICINE

## 2025-01-06 RX ORDER — METRONIDAZOLE 500 MG/1
500 TABLET ORAL EVERY 8 HOURS SCHEDULED
Qty: 21 TABLET | Refills: 0 | Status: SHIPPED | OUTPATIENT
Start: 2025-01-06 | End: 2025-01-13

## 2025-01-06 NOTE — ASSESSMENT & PLAN NOTE
Discussed how abx can affect INR and to have INR done with bleeding/bruising issues, con't INR as per PCP

## 2025-01-06 NOTE — PROGRESS NOTES
"Name: Migel Wang      : 1985      MRN: [id number]  Encounter Provider: Jennifer Todd DO  Encounter Date: 2025   Encounter department: [facility] CARE [address]   :  Assessment & Plan  Dental infection  Needs broken tooth pulled - has appt later this month with oral surgeon, has mult antibiotic allergies so cannot use Augment, Cephalosporin or Clinda, will use Flagyl tid x 7 days, SE reviewed, stressed importance of NO ALCOHOL while on the abx and 3 days after, call with new/worse symptoms, stressed importance of addressing underlying cause of the infection (ie broken tooth)   Orders:    metroNIDAZOLE (FLAGYL) 500 mg tablet; Take 1 tablet (500 mg total) by mouth every 8 (eight) hours for 7 days    Allergy to multiple antibiotics  Pt unsure of reaction but they are all on allergy list -  urged to try and find out reactions and discuss with PCP at next appt to ensure these are all true active allergies       Chronic anticoagulation  Discussed how abx can affect INR and to have INR done with bleeding/bruising issues, con't INR as per PCP              History of Present Illness     HPI Pt here for an acute visit    Pt broke a tooth \" a while ago\" and last week he started to note an increase in tooth pain and some swelling to the jaw. He notes no F/C/V. He feel nauseous.  He is using Tylenol and Aleve and salt water gargles.         Review of Systems   Constitutional:  Negative for chills and fever.   Eyes:  Negative for pain.   Respiratory:  Negative for cough and shortness of breath.    Cardiovascular:  Negative for chest pain and palpitations.   Gastrointestinal:  Positive for nausea. Negative for abdominal pain, diarrhea and vomiting.   Skin:  Negative for rash and wound.       Objective   /88 (BP Location: Left arm, Patient Position: Sitting, Cuff Size: Large)   Pulse (!) 117   Temp 99.4 °F (37.4 °C) (Tympanic)   Ht 5' 6\" (1.676 m)   Wt (!) 174 kg (383 lb)   " SpO2 98%   BMI 61.82 kg/m²      Physical Exam  Vitals and nursing note reviewed.   Constitutional:       General: He is not in acute distress.     Appearance: He is obese. He is not ill-appearing.   HENT:      Head: Normocephalic and atraumatic.      Mouth/Throat:      Mouth: Mucous membranes are moist.      Pharynx: No oropharyngeal exudate.      Comments: L lower jaw with missing tooth and broken tooth just anterior to that, no significant pain/swelling or abscess present  Eyes:      General:         Right eye: No discharge.         Left eye: No discharge.      Conjunctiva/sclera: Conjunctivae normal.   Cardiovascular:      Rate and Rhythm: Regular rhythm. Tachycardia present.      Heart sounds: No murmur heard.  Pulmonary:      Effort: Pulmonary effort is normal. No respiratory distress.      Breath sounds: No wheezing, rhonchi or rales.   Lymphadenopathy:      Cervical: No cervical adenopathy.   Psychiatric:         Behavior: Behavior normal.         Thought Content: Thought content normal.

## 2025-01-14 ENCOUNTER — ANTICOAG VISIT (OUTPATIENT)
Dept: FAMILY MEDICINE CLINIC | Facility: HOSPITAL | Age: 40
End: 2025-01-14

## 2025-01-14 ENCOUNTER — RESULTS FOLLOW-UP (OUTPATIENT)
Dept: FAMILY MEDICINE CLINIC | Facility: HOSPITAL | Age: 40
End: 2025-01-14

## 2025-01-14 DIAGNOSIS — I10 ESSENTIAL HYPERTENSION: ICD-10-CM

## 2025-01-14 RX ORDER — AMLODIPINE BESYLATE 10 MG/1
10 TABLET ORAL DAILY
Qty: 90 TABLET | Refills: 1 | Status: SHIPPED | OUTPATIENT
Start: 2025-01-14

## 2025-01-14 RX ORDER — LISINOPRIL AND HYDROCHLOROTHIAZIDE 20; 25 MG/1; MG/1
1 TABLET ORAL DAILY
Qty: 90 TABLET | Refills: 1 | Status: SHIPPED | OUTPATIENT
Start: 2025-01-14

## 2025-01-23 ENCOUNTER — TELEPHONE (OUTPATIENT)
Age: 40
End: 2025-01-23

## 2025-01-23 NOTE — TELEPHONE ENCOUNTER
Be on the lookout for form from oral surg.  Maybe on your desk already, estella.    Having a broken tooth removed.

## 2025-01-23 NOTE — TELEPHONE ENCOUNTER
[facility] called. Report will be sending clearance form as patient will be having teeth extractions and he is currently taking coumadin.      Please review.  Thank you

## 2025-01-29 ENCOUNTER — RESULTS FOLLOW-UP (OUTPATIENT)
Dept: FAMILY MEDICINE CLINIC | Facility: HOSPITAL | Age: 40
End: 2025-01-29

## 2025-01-29 ENCOUNTER — ANTICOAG VISIT (OUTPATIENT)
Dept: FAMILY MEDICINE CLINIC | Facility: HOSPITAL | Age: 40
End: 2025-01-29

## 2025-01-29 LAB — INR PPP: 1.8 (ref 0.85–1.19)

## 2025-02-09 DIAGNOSIS — I63.9 CEREBROVASCULAR ACCIDENT (CVA), UNSPECIFIED MECHANISM (HCC): ICD-10-CM

## 2025-02-10 RX ORDER — WARFARIN SODIUM 5 MG/1
5 TABLET ORAL 2 TIMES DAILY
Qty: 60 TABLET | Refills: 5 | Status: SHIPPED | OUTPATIENT
Start: 2025-02-10

## 2025-02-11 ENCOUNTER — ANTICOAG VISIT (OUTPATIENT)
Dept: FAMILY MEDICINE CLINIC | Facility: HOSPITAL | Age: 40
End: 2025-02-11

## 2025-02-11 ENCOUNTER — RESULTS FOLLOW-UP (OUTPATIENT)
Dept: FAMILY MEDICINE CLINIC | Facility: HOSPITAL | Age: 40
End: 2025-02-11

## 2025-02-11 LAB — INR PPP: 2 (ref 0.85–1.19)

## 2025-02-19 ENCOUNTER — ANTICOAG VISIT (OUTPATIENT)
Dept: FAMILY MEDICINE CLINIC | Facility: HOSPITAL | Age: 40
End: 2025-02-19

## 2025-02-19 LAB — INR PPP: 1.5 (ref 0.85–1.19)

## 2025-03-02 DIAGNOSIS — I10 ESSENTIAL HYPERTENSION: ICD-10-CM

## 2025-03-03 RX ORDER — AMLODIPINE BESYLATE 10 MG/1
10 TABLET ORAL DAILY
Qty: 90 TABLET | Refills: 1 | Status: SHIPPED | OUTPATIENT
Start: 2025-03-03

## 2025-03-10 ENCOUNTER — RESULTS FOLLOW-UP (OUTPATIENT)
Dept: FAMILY MEDICINE CLINIC | Facility: HOSPITAL | Age: 40
End: 2025-03-10

## 2025-03-10 LAB — INR PPP: 2.9 (ref 0.85–1.19)

## 2025-03-11 ENCOUNTER — ANTICOAG VISIT (OUTPATIENT)
Dept: FAMILY MEDICINE CLINIC | Facility: HOSPITAL | Age: 40
End: 2025-03-11

## 2025-03-11 NOTE — TELEPHONE ENCOUNTER
----- Message from Lucretia Jones DO sent at 3/11/2025  8:02 AM EDT -----  Same dose- repeat in 2 weeks- inr 2.9

## 2025-03-13 DIAGNOSIS — I63.9 CEREBROVASCULAR ACCIDENT (CVA), UNSPECIFIED MECHANISM (HCC): ICD-10-CM

## 2025-03-14 RX ORDER — WARFARIN SODIUM 5 MG/1
5 TABLET ORAL 2 TIMES DAILY
Qty: 180 TABLET | Refills: 0 | Status: SHIPPED | OUTPATIENT
Start: 2025-03-14

## 2025-03-26 ENCOUNTER — ANTICOAG VISIT (OUTPATIENT)
Dept: FAMILY MEDICINE CLINIC | Facility: HOSPITAL | Age: 40
End: 2025-03-26

## 2025-03-26 ENCOUNTER — RESULTS FOLLOW-UP (OUTPATIENT)
Dept: FAMILY MEDICINE CLINIC | Facility: HOSPITAL | Age: 40
End: 2025-03-26

## 2025-03-26 LAB — INR PPP: 3.9 (ref 0.85–1.19)

## 2025-03-28 ENCOUNTER — ANTICOAG VISIT (OUTPATIENT)
Dept: FAMILY MEDICINE CLINIC | Facility: HOSPITAL | Age: 40
End: 2025-03-28

## 2025-03-28 ENCOUNTER — RESULTS FOLLOW-UP (OUTPATIENT)
Dept: FAMILY MEDICINE CLINIC | Facility: HOSPITAL | Age: 40
End: 2025-03-28

## 2025-04-08 ENCOUNTER — ANTICOAG VISIT (OUTPATIENT)
Dept: FAMILY MEDICINE CLINIC | Facility: HOSPITAL | Age: 40
End: 2025-04-08

## 2025-04-08 ENCOUNTER — RESULTS FOLLOW-UP (OUTPATIENT)
Dept: FAMILY MEDICINE CLINIC | Facility: HOSPITAL | Age: 40
End: 2025-04-08

## 2025-04-08 LAB — INR PPP: 2.4 (ref 0.85–1.19)

## 2025-04-22 ENCOUNTER — ANTICOAG VISIT (OUTPATIENT)
Dept: FAMILY MEDICINE CLINIC | Facility: HOSPITAL | Age: 40
End: 2025-04-22

## 2025-04-22 ENCOUNTER — RESULTS FOLLOW-UP (OUTPATIENT)
Dept: FAMILY MEDICINE CLINIC | Facility: HOSPITAL | Age: 40
End: 2025-04-22

## 2025-04-22 DIAGNOSIS — I63.9 CEREBROVASCULAR ACCIDENT (CVA), UNSPECIFIED MECHANISM (HCC): ICD-10-CM

## 2025-04-22 RX ORDER — WARFARIN SODIUM 5 MG/1
5 TABLET ORAL 2 TIMES DAILY
Qty: 180 TABLET | Refills: 0 | Status: SHIPPED | OUTPATIENT
Start: 2025-04-22

## 2025-04-22 NOTE — TELEPHONE ENCOUNTER
LDM for pt to take 10 mg Mon, Wed, Fri and 7.5 mg all rest of day and to recheck in 1 week. Asked pt to call back so we know he got this message.

## 2025-04-22 NOTE — TELEPHONE ENCOUNTER
Patient has not taken this medication since Sunday. Please advise         Medication: warfarin (COUMADIN) 5 mg tablet     Dose/Frequency:   TAKE 1 TABLET BY MOUTH TWICE A DAY        Quantity: 180    Pharmacy: [id number] [place] [facility] [place] - [address] [facility] [facility] [place]     Office:   [x] PCP/Provider -   [] Speciality/Provider -     Does the patient have enough for 3 days?   [] Yes   [x] No - Send as HP to POD

## 2025-04-29 ENCOUNTER — RESULTS FOLLOW-UP (OUTPATIENT)
Dept: FAMILY MEDICINE CLINIC | Facility: HOSPITAL | Age: 40
End: 2025-04-29

## 2025-05-01 NOTE — TELEPHONE ENCOUNTER
Patient returned previous call, he reported that he has been taking 10 mg M/W/F then 7.5 MG Tues/Thur/Sat/Sun. He only missed one dose on his 1.5 tab day. Monday 04/21 INR of 1.7 pt reporting due to being out of medication, Then INR Monday 04/28 of 1.4 after he was told to continue taking his normal dose until clinical team had reviewed with provider. Patient is currently working tried transferring to office but we were disconnected, patient would like a return call but please leave a detailed message on his machine as he will be driving and will not be able to answer. He reported not being on any ABX recently. Please forward to provider for review

## 2025-05-02 ENCOUNTER — ANTICOAG VISIT (OUTPATIENT)
Dept: FAMILY MEDICINE CLINIC | Facility: HOSPITAL | Age: 40
End: 2025-05-02

## 2025-05-02 LAB — SL AMB POCT INR: 1.4

## 2025-05-02 NOTE — TELEPHONE ENCOUNTER
I spoke with Dr. Robert warner for pt to take m,w,f,sat 10 mg and then 7.5 mg Tuesday, Thursday and Saturday pt aware

## 2025-05-02 NOTE — TELEPHONE ENCOUNTER
Migel called inquiring about earlier calls, attempted to warm transfer to clinical, to see if waylon was available, no one available. Wanted clarification on dosage.    Please reach out to Migel, he stated he is really busy today so to please leave a detailed message so he has it.

## 2025-05-02 NOTE — TELEPHONE ENCOUNTER
I spoke with pt, he call and spoke with someone the other day, it does not look like the message was sent to you     igned         Patient returned previous call, he reported that he has been taking 10 mg M/W/F then 7.5 MG Tues/Thur/Sat/Sun. He only missed one dose on his 1.5 tab day. Monday 04/21 INR of 1.7 pt reporting due to being out of medication, Then INR Monday 04/28 of 1.4 after he was told to continue taking his normal dose until clinical team had reviewed with provider. Patient is currently working tried transferring to office but we were disconnected, patient would like a return call but please leave a detailed message on his machine as he will be driving and will not be able to answer. He reported not being on any ABX recently. Please forward to provider for review           PLEASE ADVISE ANY DOSE CHANGES

## 2025-05-05 LAB — INR PPP: 2.2 (ref 0.85–1.19)

## 2025-05-06 ENCOUNTER — NURSE TRIAGE (OUTPATIENT)
Age: 40
End: 2025-05-06

## 2025-05-06 ENCOUNTER — RESULTS FOLLOW-UP (OUTPATIENT)
Dept: FAMILY MEDICINE CLINIC | Facility: HOSPITAL | Age: 40
End: 2025-05-06

## 2025-05-06 NOTE — TELEPHONE ENCOUNTER
"FOLLOW UP: Return call to patient with further instructions.    REASON FOR CONVERSATION: PT/INR Result    SYMPTOMS: n/a    OTHER: n/a    DISPOSITION: Discuss With PCP and Callback by Nurse Today        Answer Assessment - Initial Assessment Questions  1. REASON FOR CALL: \"What is the main reason for your call?\" or \"How can I best help you?\"      Patient called with INR result and requests follow up via message with instructions.    Protocols used: Information Only Call - No Triage-Adult-[place]    "

## 2025-05-06 NOTE — TELEPHONE ENCOUNTER
Regarding: PT INR 2.2  ----- Message from Lesia GREGG sent at 5/6/2025  8:53 AM EDT -----  Patient called this morning to report yesterday's PT INR as 2.2. He said he is at work and to leave a message with instructions.

## 2025-05-07 ENCOUNTER — ANTICOAG VISIT (OUTPATIENT)
Dept: FAMILY MEDICINE CLINIC | Facility: HOSPITAL | Age: 40
End: 2025-05-07

## 2025-05-13 ENCOUNTER — RESULTS FOLLOW-UP (OUTPATIENT)
Dept: FAMILY MEDICINE CLINIC | Facility: HOSPITAL | Age: 40
End: 2025-05-13

## 2025-05-13 ENCOUNTER — ANTICOAG VISIT (OUTPATIENT)
Dept: FAMILY MEDICINE CLINIC | Facility: HOSPITAL | Age: 40
End: 2025-05-13

## 2025-05-13 LAB — INR PPP: 2.5 (ref 0.85–1.19)

## 2025-05-18 DIAGNOSIS — E78.49 OTHER HYPERLIPIDEMIA: ICD-10-CM

## 2025-05-18 RX ORDER — ATORVASTATIN CALCIUM 20 MG/1
20 TABLET, FILM COATED ORAL DAILY
Qty: 30 TABLET | Refills: 5 | Status: SHIPPED | OUTPATIENT
Start: 2025-05-18

## 2025-05-26 ENCOUNTER — OFFICE VISIT (OUTPATIENT)
Dept: URGENT CARE | Facility: CLINIC | Age: 40
End: 2025-05-26
Payer: COMMERCIAL

## 2025-05-26 VITALS
HEART RATE: 70 BPM | SYSTOLIC BLOOD PRESSURE: 130 MMHG | OXYGEN SATURATION: 98 % | RESPIRATION RATE: 18 BRPM | TEMPERATURE: 97.9 F | DIASTOLIC BLOOD PRESSURE: 84 MMHG

## 2025-05-26 DIAGNOSIS — L08.9 SUPERFICIAL SKIN INFECTION: Primary | ICD-10-CM

## 2025-05-26 PROCEDURE — 99203 OFFICE O/P NEW LOW 30 MIN: CPT

## 2025-05-26 RX ORDER — CLINDAMYCIN HYDROCHLORIDE 300 MG/1
300 CAPSULE ORAL 3 TIMES DAILY
Qty: 21 CAPSULE | Refills: 0 | Status: SHIPPED | OUTPATIENT
Start: 2025-05-26 | End: 2025-06-02

## 2025-05-26 NOTE — PATIENT INSTRUCTIONS
Take clindamycin as prescribed.  Complete the entire course of antibiotics - even if you are feeling better.     Continue wound care - bacitracin / antibiotic ointment - keep clean, dry, and covered.    Warm compresses, 10-15 minutes 2-3 times daily for the next 2-3 days.    Return to [facility] or go to ER for worsening redness, red line streaking, warmth, swelling, pain, pus drainage, or if you develop fevers/chills.    Follow up with your PCP in 3-5 days.

## 2025-05-26 NOTE — PROGRESS NOTES
"  [facility]        NAME: Migel Wang is a 40 y.o. male  : 1985    MRN: [id number]  DATE: May 27, 2025  TIME: 10:26 PM    Assessment and Plan   Superficial skin infection [L08.9]  1. Superficial skin infection  clindamycin (CLEOCIN) 300 MG capsule            Patient Instructions     Take clindamycin as prescribed.  Complete the entire course of antibiotics - even if you are feeling better.     Continue wound care - bacitracin / antibiotic ointment - keep clean, dry, and covered.    Warm compresses, 10-15 minutes 2-3 times daily for the next 2-3 days.    Return to [facility] or go to ER for worsening redness, red line streaking, warmth, swelling, pain, pus drainage, or if you develop fevers/chills.    Follow up with your PCP in 3-5 days.     If tests are performed, our office will contact you with results only if changes need to made to the care plan discussed with you at the visit. You can review your full results on [facility] Mychart.      Chief Complaint     Chief Complaint   Patient presents with    Rash     Patient with rash on lower abdomen. Patient states it started out as a rash but patient states it is now more like a \"blister.\" Patient currently has it covered, so as the rash not to open up.          History of Present Illness       40-year-old male who presents for evaluation of a rash to his abdomen. Patient states it is red, warm, and tender to the touch. Noticed it a few days ago, worsening, now with overlying blister. Denies burning sensation and itchiness. No other associated symptoms. Denies new skin care products, soaps, detergents, medications, foods, and exposures to new plants or animals. Allergies discussed. On warfarin.        Review of Systems   Review of Systems   Skin:  Positive for rash.         Current Medications     Current Medications[1]    Current Allergies     Allergies as of 2025 - Reviewed 2025   Allergen Reaction Noted    Amoxicillin  10/13/2014    " Azithromycin  10/13/2014    Cefaclor  10/13/2014    Pollen extract  02/27/2015    Doxycycline Rash 06/29/2024            The following portions of the patient's history were reviewed and updated as appropriate: allergies, current medications, past family history, past medical history, past social history, past surgical history and problem list.     Past Medical History[2]    Past Surgical History[3]    Family History[4]      Medications have been verified.        Objective   /84   Pulse 70   Temp 97.9 °F (36.6 °C)   Resp 18   SpO2 98%        Physical Exam     Physical Exam  Vitals and nursing note reviewed.   Constitutional:       General: He is not in acute distress.     Appearance: He is obese. He is not ill-appearing, toxic-appearing or diaphoretic.   HENT:      Head: Normocephalic and atraumatic.      Mouth/Throat:      Mouth: Mucous membranes are moist.      Pharynx: Oropharynx is clear.     Eyes:      Conjunctiva/sclera: Conjunctivae normal.       Cardiovascular:      Rate and Rhythm: Normal rate.   Pulmonary:      Effort: Pulmonary effort is normal.     Musculoskeletal:         General: Normal range of motion.      Cervical back: Normal range of motion and neck supple.     Skin:     General: Skin is warm and dry.      Capillary Refill: Capillary refill takes less than 2 seconds.      Findings: Erythema and rash present.      Comments: 13 cm area of linear erythema to abdomen, single blister down length of erythema, crosses midline, +warmth, slightly TTP. No areas of fluctuance or induration. No streaking.       Neurological:      Mental Status: He is alert and oriented to person, place, and time.                        [1]   Current Outpatient Medications:     clindamycin (CLEOCIN) 300 MG capsule, Take 1 capsule (300 mg total) by mouth 3 (three) times a day for 7 days, Disp: 21 capsule, Rfl: 0    albuterol (ProAir HFA) 90 mcg/act inhaler, Inhale 2 puffs every 6 (six) hours as needed for wheezing,  Disp: 18 g, Rfl: 2    amLODIPine (NORVASC) 10 mg tablet, TAKE 1 TABLET BY MOUTH EVERY DAY, Disp: 90 tablet, Rfl: 1    atorvastatin (LIPITOR) 20 mg tablet, TAKE 1 TABLET BY MOUTH EVERY DAY, Disp: 30 tablet, Rfl: 5    cetirizine (ZyrTEC) 10 mg tablet, Take 10 mg by mouth daily, Disp: , Rfl:     ipratropium (ATROVENT) 0.03 % nasal spray, SPRAY 2 SPRAYS INTO EACH NOSTRIL EVERY 12 HOURS., Disp: 30 mL, Rfl: 5    Lidocaine Viscous HCl (XYLOCAINE) 2 % mucosal solution, Swish and spit 15 mL 4 (four) times a day as needed for mouth pain or discomfort, Disp: 100 mL, Rfl: 0    lisinopril-hydrochlorothiazide (PRINZIDE,ZESTORETIC) 20-25 MG per tablet, TAKE 1 TABLET BY MOUTH EVERY DAY, Disp: 90 tablet, Rfl: 1    spironolactone (ALDACTONE) 25 mg tablet, TAKE 1 TABLET (25 MG TOTAL) BY MOUTH DAILY., Disp: 90 tablet, Rfl: 4    warfarin (COUMADIN) 5 mg tablet, Take 1 tablet (5 mg total) by mouth 2 (two) times a day, Disp: 180 tablet, Rfl: 0  [2]   Past Medical History:  Diagnosis Date    Cerebral thrombosis with cerebral infarction (HCC) 10/1/2014    COVID-19 10/13/2021    COVID-19 10/13/2021    Hypertension     Other hyperlipidemia 10/3/2014    Stroke (HCC)    [3]   Past Surgical History:  Procedure Laterality Date    HERNIA REPAIR      INGUINAL HERNIA REPAIR     [4]   Family History  Problem Relation Name Age of Onset    Cancer Mother      Hypertension Mother      Thyroid disease Mother      Cancer Father      Diabetes Father      Hypertension Father      Stroke Father      Prostate cancer Other Uncle     Substance Abuse Neg Hx          neg fam hx    Mental illness Neg Hx          neg fam hx

## 2025-05-27 ENCOUNTER — ANTICOAG VISIT (OUTPATIENT)
Dept: FAMILY MEDICINE CLINIC | Facility: HOSPITAL | Age: 40
End: 2025-05-27

## 2025-05-27 ENCOUNTER — NURSE TRIAGE (OUTPATIENT)
Age: 40
End: 2025-05-27

## 2025-05-27 LAB — INR PPP: 3 (ref 0.85–1.19)

## 2025-05-27 NOTE — TELEPHONE ENCOUNTER
Regarding: PT INR  ----- Message from Anna GREGG sent at 5/27/2025 10:15 AM EDT -----  Migel called to report PT INR; stated it was .3.     He also wanted to advise Dr. Jones that he was seen at  on Monday and put on an antibiotic for a red migel on stomach that turned into a blister. He's aware that the antibiotics can affect the warfarin and wanted to make her aware. Please contact with dose and instructions.

## 2025-05-27 NOTE — TELEPHONE ENCOUNTER
"REASON FOR CONVERSATION: No Triage Call    SYMPTOMS: N/A    OTHER HEALTH INFORMATION: INR is 3.0    PROTOCOL DISPOSITION: Callback by PCP Today    CARE ADVICE PROVIDED: N/A    PRACTICE FOLLOW-UP: Patient wanted to advise Dr. Jones that he was seen at  on Monday and put on an antibiotic for a red deshaun on stomach that turned into a blister. He's aware that the antibiotics can affect the warfarin and wanted to make her aware. Please contact with dose and instructions.     Reason for Disposition   Caller has NON-URGENT medicine question about med that PCP or specialist prescribed and triager unable to answer question    Answer Assessment - Initial Assessment Questions  1. NAME of MEDICINE: \"What medicine(s) are you calling about?\"      Clindamycin, Warfarin  2. QUESTION: \"What is your question?\" (e.g., double dose of medicine, side effect)      Wants Dr. Jones to be informed in case medication has interaction  3. PRESCRIBER: \"Who prescribed the medicine?\" Reason: if prescribed by specialist, call should be referred to that group.      Dr. Jones  4. SYMPTOMS: \"Do you have any symptoms?\" If Yes, ask: \"What symptoms are you having?\"  \"How bad are the symptoms (e.g., mild, moderate, severe)      N/A    Protocols used: Medication Question Call-Adult-[place]    "

## 2025-05-28 LAB — INR PPP: 3 (ref 0.85–1.19)

## 2025-05-29 ENCOUNTER — RESULTS FOLLOW-UP (OUTPATIENT)
Dept: FAMILY MEDICINE CLINIC | Facility: HOSPITAL | Age: 40
End: 2025-05-29

## 2025-05-29 ENCOUNTER — ANTICOAG VISIT (OUTPATIENT)
Dept: FAMILY MEDICINE CLINIC | Facility: HOSPITAL | Age: 40
End: 2025-05-29

## 2025-05-29 NOTE — TELEPHONE ENCOUNTER
----- Message from Lucretia Jones DO sent at 5/29/2025  8:38 AM EDT -----  Same dose warfarin- repeat in 2 weeks  ----- Message -----  From: MUMTAZ Feuntes  Sent: 5/29/2025   8:24 AM EDT  To: Lucretia Jones DO

## 2025-05-29 NOTE — TELEPHONE ENCOUNTER
Left detailed message with instructions.  10 MG Mon, Fri, and Sun  7.5 MG the rest of the week.   Repeat INR in 2 weeks

## 2025-06-04 ENCOUNTER — TELEPHONE (OUTPATIENT)
Age: 40
End: 2025-06-04

## 2025-06-04 LAB — INR PPP: 2.1 (ref 0.85–1.19)

## 2025-06-04 NOTE — TELEPHONE ENCOUNTER
Patient said he stuck himself it was 2.1.  He would like a call back to know what dose to take and would like to know what the instructions are for the warfarin (COUMADIN) 5 mg tablet?  He said to leave message on his voicemail.  He also wanted Dr. Jones that he took his last pill of antibiotic on Monday morning.

## 2025-06-05 ENCOUNTER — RESULTS FOLLOW-UP (OUTPATIENT)
Dept: FAMILY MEDICINE CLINIC | Facility: HOSPITAL | Age: 40
End: 2025-06-05

## 2025-06-15 DIAGNOSIS — I10 ESSENTIAL HYPERTENSION: ICD-10-CM

## 2025-06-15 DIAGNOSIS — E78.49 OTHER HYPERLIPIDEMIA: ICD-10-CM

## 2025-06-15 RX ORDER — SPIRONOLACTONE 25 MG/1
25 TABLET ORAL DAILY
Qty: 90 TABLET | Refills: 1 | Status: SHIPPED | OUTPATIENT
Start: 2025-06-15

## 2025-06-15 RX ORDER — ATORVASTATIN CALCIUM 20 MG/1
20 TABLET, FILM COATED ORAL DAILY
Qty: 90 TABLET | Refills: 2 | OUTPATIENT
Start: 2025-06-15

## 2025-06-15 RX ORDER — AMLODIPINE BESYLATE 10 MG/1
10 TABLET ORAL DAILY
Qty: 90 TABLET | Refills: 2 | OUTPATIENT
Start: 2025-06-15

## 2025-06-17 ENCOUNTER — ANTICOAG VISIT (OUTPATIENT)
Dept: FAMILY MEDICINE CLINIC | Facility: HOSPITAL | Age: 40
End: 2025-06-17

## 2025-06-17 ENCOUNTER — HOSPITAL ENCOUNTER (EMERGENCY)
Facility: HOSPITAL | Age: 40
Discharge: HOME | End: 2025-06-18
Attending: EMERGENCY MEDICINE
Payer: COMMERCIAL

## 2025-06-17 ENCOUNTER — NURSE TRIAGE (OUTPATIENT)
Age: 40
End: 2025-06-17

## 2025-06-17 ENCOUNTER — APPOINTMENT (EMERGENCY)
Dept: CT IMAGING | Facility: HOSPITAL | Age: 40
End: 2025-06-17
Payer: COMMERCIAL

## 2025-06-17 ENCOUNTER — NURSE TRIAGE (OUTPATIENT)
Dept: OTHER | Facility: OTHER | Age: 40
End: 2025-06-17

## 2025-06-17 DIAGNOSIS — E87.6 HYPOKALEMIA: ICD-10-CM

## 2025-06-17 DIAGNOSIS — R10.9 ABDOMINAL PAIN: Primary | ICD-10-CM

## 2025-06-17 DIAGNOSIS — E27.9 ADRENAL NODULE (HCC): ICD-10-CM

## 2025-06-17 LAB
2HR DELTA HS TROPONIN: -1 NG/L
ALBUMIN SERPL BCG-MCNC: 4.3 G/DL (ref 3.5–5)
ALP SERPL-CCNC: 66 U/L (ref 34–104)
ALT SERPL W P-5'-P-CCNC: 46 U/L (ref 7–52)
ANION GAP SERPL CALCULATED.3IONS-SCNC: 8 MMOL/L (ref 4–13)
APTT PPP: 41 SECONDS (ref 23–34)
AST SERPL W P-5'-P-CCNC: 36 U/L (ref 13–39)
BASOPHILS # BLD AUTO: 0.04 THOUSANDS/ÂΜL (ref 0–0.1)
BASOPHILS NFR BLD AUTO: 0 % (ref 0–1)
BILIRUB SERPL-MCNC: 0.49 MG/DL (ref 0.2–1)
BUN SERPL-MCNC: 19 MG/DL (ref 5–25)
CALCIUM SERPL-MCNC: 9.1 MG/DL (ref 8.4–10.2)
CARDIAC TROPONIN I PNL SERPL HS: 10 NG/L (ref ?–50)
CARDIAC TROPONIN I PNL SERPL HS: 9 NG/L (ref ?–50)
CHLORIDE SERPL-SCNC: 101 MMOL/L (ref 96–108)
CO2 SERPL-SCNC: 30 MMOL/L (ref 21–32)
CREAT SERPL-MCNC: 1.08 MG/DL (ref 0.6–1.3)
EOSINOPHIL # BLD AUTO: 0.09 THOUSAND/ÂΜL (ref 0–0.61)
EOSINOPHIL NFR BLD AUTO: 1 % (ref 0–6)
ERYTHROCYTE [DISTWIDTH] IN BLOOD BY AUTOMATED COUNT: 13.5 % (ref 11.6–15.1)
GFR SERPL CREATININE-BSD FRML MDRD: 84 ML/MIN/1.73SQ M
GLUCOSE SERPL-MCNC: 88 MG/DL (ref 65–140)
HCT VFR BLD AUTO: 46.7 % (ref 36.5–49.3)
HGB BLD-MCNC: 15.5 G/DL (ref 12–17)
IMM GRANULOCYTES # BLD AUTO: 0.07 THOUSAND/UL (ref 0–0.2)
IMM GRANULOCYTES NFR BLD AUTO: 1 % (ref 0–2)
INR PPP: 2.35 (ref 0.85–1.19)
INR PPP: 3 (ref 0.85–1.19)
LIPASE SERPL-CCNC: 24 U/L (ref 11–82)
LYMPHOCYTES # BLD AUTO: 1.95 THOUSANDS/ÂΜL (ref 0.6–4.47)
LYMPHOCYTES NFR BLD AUTO: 16 % (ref 14–44)
MCH RBC QN AUTO: 28.1 PG (ref 26.8–34.3)
MCHC RBC AUTO-ENTMCNC: 33.2 G/DL (ref 31.4–37.4)
MCV RBC AUTO: 85 FL (ref 82–98)
MONOCYTES # BLD AUTO: 1.41 THOUSAND/ÂΜL (ref 0.17–1.22)
MONOCYTES NFR BLD AUTO: 12 % (ref 4–12)
NEUTROPHILS # BLD AUTO: 8.74 THOUSANDS/ÂΜL (ref 1.85–7.62)
NEUTS SEG NFR BLD AUTO: 70 % (ref 43–75)
NRBC BLD AUTO-RTO: 0 /100 WBCS
PLATELET # BLD AUTO: 279 THOUSANDS/UL (ref 149–390)
PMV BLD AUTO: 9.2 FL (ref 8.9–12.7)
POTASSIUM SERPL-SCNC: 3.1 MMOL/L (ref 3.5–5.3)
PROT SERPL-MCNC: 7.5 G/DL (ref 6.4–8.4)
PROTHROMBIN TIME: 26.1 SECONDS (ref 12.3–15)
RBC # BLD AUTO: 5.51 MILLION/UL (ref 3.88–5.62)
SODIUM SERPL-SCNC: 139 MMOL/L (ref 135–147)
WBC # BLD AUTO: 12.3 THOUSAND/UL (ref 4.31–10.16)

## 2025-06-17 PROCEDURE — 36415 COLL VENOUS BLD VENIPUNCTURE: CPT | Performed by: PHYSICIAN ASSISTANT

## 2025-06-17 PROCEDURE — 96361 HYDRATE IV INFUSION ADD-ON: CPT

## 2025-06-17 PROCEDURE — 80053 COMPREHEN METABOLIC PANEL: CPT | Performed by: PHYSICIAN ASSISTANT

## 2025-06-17 PROCEDURE — 99284 EMERGENCY DEPT VISIT MOD MDM: CPT

## 2025-06-17 PROCEDURE — 96375 TX/PRO/DX INJ NEW DRUG ADDON: CPT

## 2025-06-17 PROCEDURE — 85025 COMPLETE CBC W/AUTO DIFF WBC: CPT | Performed by: PHYSICIAN ASSISTANT

## 2025-06-17 PROCEDURE — 85730 THROMBOPLASTIN TIME PARTIAL: CPT | Performed by: PHYSICIAN ASSISTANT

## 2025-06-17 PROCEDURE — 93005 ELECTROCARDIOGRAM TRACING: CPT

## 2025-06-17 PROCEDURE — 85610 PROTHROMBIN TIME: CPT | Performed by: PHYSICIAN ASSISTANT

## 2025-06-17 PROCEDURE — 74177 CT ABD & PELVIS W/CONTRAST: CPT

## 2025-06-17 PROCEDURE — 99285 EMERGENCY DEPT VISIT HI MDM: CPT | Performed by: PHYSICIAN ASSISTANT

## 2025-06-17 PROCEDURE — 84484 ASSAY OF TROPONIN QUANT: CPT | Performed by: PHYSICIAN ASSISTANT

## 2025-06-17 PROCEDURE — 83690 ASSAY OF LIPASE: CPT | Performed by: PHYSICIAN ASSISTANT

## 2025-06-17 PROCEDURE — 96365 THER/PROPH/DIAG IV INF INIT: CPT

## 2025-06-17 RX ORDER — KETOROLAC TROMETHAMINE 30 MG/ML
15 INJECTION, SOLUTION INTRAMUSCULAR; INTRAVENOUS ONCE
Status: COMPLETED | OUTPATIENT
Start: 2025-06-17 | End: 2025-06-17

## 2025-06-17 RX ORDER — ACETAMINOPHEN 10 MG/ML
1000 INJECTION, SOLUTION INTRAVENOUS ONCE
Status: COMPLETED | OUTPATIENT
Start: 2025-06-17 | End: 2025-06-17

## 2025-06-17 RX ORDER — FAMOTIDINE 10 MG/ML
20 INJECTION, SOLUTION INTRAVENOUS ONCE
Status: COMPLETED | OUTPATIENT
Start: 2025-06-17 | End: 2025-06-17

## 2025-06-17 RX ORDER — POTASSIUM CHLORIDE 1500 MG/1
40 TABLET, EXTENDED RELEASE ORAL ONCE
Status: COMPLETED | OUTPATIENT
Start: 2025-06-17 | End: 2025-06-18

## 2025-06-17 RX ORDER — HYDROMORPHONE HCL/PF 1 MG/ML
0.5 SYRINGE (ML) INJECTION ONCE
Status: COMPLETED | OUTPATIENT
Start: 2025-06-17 | End: 2025-06-17

## 2025-06-17 RX ORDER — ONDANSETRON 2 MG/ML
4 INJECTION INTRAMUSCULAR; INTRAVENOUS ONCE
Status: COMPLETED | OUTPATIENT
Start: 2025-06-17 | End: 2025-06-17

## 2025-06-17 RX ADMIN — HYDROMORPHONE HYDROCHLORIDE 0.5 MG: 1 INJECTION, SOLUTION INTRAMUSCULAR; INTRAVENOUS; SUBCUTANEOUS at 22:40

## 2025-06-17 RX ADMIN — IOHEXOL 100 ML: 350 INJECTION, SOLUTION INTRAVENOUS at 22:02

## 2025-06-17 RX ADMIN — FAMOTIDINE 20 MG: 10 INJECTION, SOLUTION INTRAVENOUS at 21:12

## 2025-06-17 RX ADMIN — SODIUM CHLORIDE 1000 ML: 0.9 INJECTION, SOLUTION INTRAVENOUS at 20:53

## 2025-06-17 RX ADMIN — ACETAMINOPHEN 1000 MG: 10 INJECTION INTRAVENOUS at 22:42

## 2025-06-17 RX ADMIN — KETOROLAC TROMETHAMINE 15 MG: 30 INJECTION, SOLUTION INTRAMUSCULAR; INTRAVENOUS at 21:11

## 2025-06-17 RX ADMIN — SODIUM CHLORIDE 1000 ML: 0.9 INJECTION, SOLUTION INTRAVENOUS at 23:09

## 2025-06-17 RX ADMIN — ONDANSETRON 4 MG: 2 INJECTION INTRAMUSCULAR; INTRAVENOUS at 21:09

## 2025-06-17 NOTE — TELEPHONE ENCOUNTER
"Regarding: Severe abdominal/back pain/Diarrhea  ----- Message from Nesha GREGG sent at 6/17/2025  7:40 PM EDT -----  Patient stated, \"I am having stabbing like stomach pains that goes to my back. Wakes me up from my sleep. I've been dealing with this for a few days and has been getting worse. The pain level does vary from time to time. I also have diarrhea .\"    "

## 2025-06-17 NOTE — TELEPHONE ENCOUNTER
"REASON FOR CONVERSATION: Abdominal Pain    SYMPTOMS: severe epigastric pain radiating to back. Intermittent and worse after eating. Also with diarrhea, no blood per pt. however, hx multiple strokes, HTN, cardiac risk factors. Described as \"someone stabbing me in the back with a knife\" and \"someone tearing my insides.\" No chest pain or SOB. No weakness or dizziness.    OTHER HEALTH INFORMATION: hx multiple strokes, on warfarin    PROTOCOL DISPOSITION: Go to ED Now    CARE ADVICE PROVIDED: advised ED with 911 precautions. Advised leave now to nearest ED, wife to drive. PT verbalized understanding.    PRACTICE FOLLOW-UP: Pending ED eval.      Reason for Disposition   [1] Pain lasts > 10 minutes AND [2] age > 35 AND [3] at least one cardiac risk factor (e.g., diabetes, high cholesterol, hypertension, obesity, smoker or strong family history of heart disease)    Answer Assessment - Initial Assessment Questions  1. LOCATION: \"Where does it hurt?\"       Epigastric    2. RADIATION: \"Does the pain shoot anywhere else?\" (e.g., chest, back)      Radiates to back    3. ONSET: \"When did the pain begin?\" (e.g., minutes, hours or days ago)       A few days ago    4. SUDDEN: \"Gradual or sudden onset?\"      Gradual    5. PATTERN \"Does the pain come and go, or is it constant?\"      Comes and goes    6. SEVERITY: \"How bad is the pain?\"  (e.g., Scale 1-10; mild, moderate, or severe)      Severe    7. RECURRENT SYMPTOM: \"Have you ever had this type of stomach pain before?\" If Yes, ask: \"When was the last time?\" and \"What happened that time?\"       No    8. AGGRAVATING FACTORS: \"Does anything seem to cause this pain?\" (e.g., foods, stress, alcohol)      Worse after eating    9. CARDIAC SYMPTOMS: \"Do you have any of the following symptoms: chest pain, difficulty breathing, sweating, nausea?\"      Denies, but does have hx multiple strokes, is on warfarin    10. OTHER SYMPTOMS: \"Do you have any other symptoms?\" (e.g., back pain, diarrhea, " "fever, urination pain, vomiting)        Back pain        Diarrhea    Hx multiple strokes on warfarin, HTN  \"Feels like someone is tearing my insides apart, or stabbing me in the back with a knife\"    Protocols used: Abdominal Pain - Upper-Adult-[facility]    "

## 2025-06-17 NOTE — TELEPHONE ENCOUNTER
"Reason for Disposition  • Requesting lab results and adult stable (no new symptoms, not getting worse)    Answer Assessment - Initial Assessment Questions  1. REASON FOR CALL: \"What is the main reason for your call?\" or \"How can I best help you?\"      Migel called to report yesterday's PT INR as 3. Please leave voice message with instructions.    Protocols used: Information Only Call - No Triage-Adult-[place]    "

## 2025-06-17 NOTE — TELEPHONE ENCOUNTER
Regarding: PT INR Reporting  ----- Message from Anna GREGG sent at 6/17/2025  9:52 AM EDT -----  Migel called to report yesterday's PT INR as 3. Please leave voice message with instructions.

## 2025-06-18 ENCOUNTER — TELEPHONE (OUTPATIENT)
Dept: FAMILY MEDICINE CLINIC | Facility: HOSPITAL | Age: 40
End: 2025-06-18

## 2025-06-18 VITALS
RESPIRATION RATE: 18 BRPM | TEMPERATURE: 98.9 F | HEART RATE: 85 BPM | WEIGHT: 315 LBS | HEIGHT: 66 IN | BODY MASS INDEX: 50.62 KG/M2 | DIASTOLIC BLOOD PRESSURE: 81 MMHG | OXYGEN SATURATION: 100 % | SYSTOLIC BLOOD PRESSURE: 148 MMHG

## 2025-06-18 DIAGNOSIS — E27.8 MASS OF BOTH ADRENAL GLANDS (HCC): Primary | ICD-10-CM

## 2025-06-18 LAB
ATRIAL RATE: 97 BPM
P AXIS: 13 DEGREES
PR INTERVAL: 152 MS
QRS AXIS: -17 DEGREES
QRSD INTERVAL: 96 MS
QT INTERVAL: 384 MS
QTC INTERVAL: 487 MS
T WAVE AXIS: 36 DEGREES
VENTRICULAR RATE: 97 BPM

## 2025-06-18 PROCEDURE — 93010 ELECTROCARDIOGRAM REPORT: CPT | Performed by: INTERNAL MEDICINE

## 2025-06-18 RX ORDER — PANTOPRAZOLE SODIUM 20 MG/1
20 TABLET, DELAYED RELEASE ORAL DAILY
Qty: 20 TABLET | Refills: 0 | Status: SHIPPED | OUTPATIENT
Start: 2025-06-18

## 2025-06-18 RX ORDER — ONDANSETRON 4 MG/1
4 TABLET, FILM COATED ORAL EVERY 6 HOURS
Qty: 12 TABLET | Refills: 0 | Status: SHIPPED | OUTPATIENT
Start: 2025-06-18 | End: 2025-06-24 | Stop reason: ALTCHOICE

## 2025-06-18 RX ADMIN — POTASSIUM CHLORIDE 40 MEQ: 1500 TABLET, EXTENDED RELEASE ORAL at 00:11

## 2025-06-18 NOTE — DISCHARGE INSTRUCTIONS
You had small nodules on your adrenal glands which will need follow-up with a dedicated CAT scan to further evaluate this finding please follow-up with your primary care provider for further evaluation and care

## 2025-06-18 NOTE — TELEPHONE ENCOUNTER
DO JON[facility]  Was in ed with abdominal pain- needs appt with me next week- found to have adrenal mass bilaterally-   Needs a ct with adrenal protocol done as per the radiology reading- can you please call the  radiology department to find out the proper ordering code for the adrenal protocol as I can't find it in epic   I have  also put in orders for adrenal hormones labs as well as referral to endocrinology team             Discharge Notification     Patient: Migel Wang  : 1985 (40 yrs)  No data recorded  PCP: Lucretia Jones DO  Attending: No att. providers found  [facility], Unit: [facility]  Admission Date: 2025  Patient Class: Emergency  ER Presenting complaint:  Abdominal Pain  Admitting Diagnosis: Abdominal pain [R10.9]

## 2025-06-18 NOTE — ED PROVIDER NOTES
ED Disposition       None          Assessment & Plan       Medical Decision Making  Patient is a 40-year-old male with a history of stroke currently on warfarin, hypertension, hyperlipidemia, surgical history of inguinal hernia repair that presents to the emerged from with intermittent sharp shooting right side abdominal pain symptoms for approximate 3 days.   Patient hemodynamically stable and afebrile  No sirs  ECG normal sinus rhythm; negative serial troponins, doubt ACS  Patient currently on warfarin, INR 2.35  Leukocytosis 12.30, no bandemia  Hypokalemia 3.1, normal kidney function  Negative lipase, doubt acute pancreatitis  Patient has yet to provide urine sample; second bolus of normal saline solution being delivered  CT abdomen pelvis with contrast in process    Delivered Multimodal pain control in the emergency department; patient demonstrates decrease in presenting right-sided abdominal pain ED symptomatology status post medication delivery  Ddx likely and not limited to acute pancreatitis, acute cholecystitis, PUD, gastritis, colitis, diverticulitis, bowel obstruction  Will treat for     *Due to voice recognition software, sound alike and misspelled words may be contained in the documentation*    Amount and/or Complexity of Data Reviewed  Labs: ordered. Decision-making details documented in ED Course.  Radiology: ordered and independent interpretation performed. Decision-making details documented in ED Course.  ECG/medicine tests: ordered and independent interpretation performed. Decision-making details documented in ED Course.    Risk  Prescription drug management.        ED Course as of 06/17/25 2343   Tue Jun 17, 2025 2113 POCT INR(!): 2.35  Patient currently on warfarin, patient reports history of stroke.   2239 Pt reports being in pain per ed rn- additional pain medication ordered       Medications   sodium chloride 0.9 % bolus 1,000 mL (1,000 mL Intravenous New Bag 6/17/25 9221)   sodium  "chloride 0.9 % bolus 1,000 mL (0 mL Intravenous Stopped 6/17/25 2153)   ondansetron (ZOFRAN) injection 4 mg (4 mg Intravenous Given 6/17/25 2109)   Famotidine (PF) (PEPCID) injection 20 mg (20 mg Intravenous Given 6/17/25 2112)   ketorolac (TORADOL) injection 15 mg (15 mg Intravenous Given 6/17/25 2111)   iohexol (OMNIPAQUE) 350 MG/ML injection (MULTI-DOSE) 100 mL (100 mL Intravenous Given 6/17/25 2202)   HYDROmorphone (DILAUDID) injection 0.5 mg (0.5 mg Intravenous Given 6/17/25 2240)   acetaminophen (Ofirmev) injection 1,000 mg (0 mg Intravenous Stopped 6/17/25 2311)       ED Risk Strat Scores                    No data recorded        SBIRT 22yo+      Flowsheet Row Most Recent Value   Initial Alcohol Screen: US AUDIT-C     1. How often do you have a drink containing alcohol? 0 Filed at: 06/17/2025 2057   2. How many drinks containing alcohol do you have on a typical day you are drinking?  0 Filed at: 06/17/2025 2057   3a. Male UNDER 65: How often do you have five or more drinks on one occasion? 0 Filed at: 06/17/2025 2057   3b. FEMALE Any Age, or MALE 65+: How often do you have 4 or more drinks on one occassion? 0 Filed at: 06/17/2025 2057   Audit-C Score 0 Filed at: 06/17/2025 2057   [place]: How many times in the past year have you...    Used an illegal drug or used a prescription medication for non-medical reasons? Never Filed at: 06/17/2025 2057                            History of Present Illness       Chief Complaint   Patient presents with    Abdominal Pain     Pt c/o mid to right sided abd pain that goes through to his back- states \"it feels like there's someone stabbing my back, it's not constant though.\" Pt also c/o diarrhea.      Patient is a 40-year-old male with a history of stroke currently on warfarin, hypertension, hyperlipidemia, surgical history of inguinal hernia repair that presents to the emerged from with intermittent sharp shooting right side abdominal pain symptoms for approximate 3 days.  " Patient has associated symptoms of watery nonbloody diarrhea beginning to ED presentation of right-sided abdominal pain along with nausea symptoms.  Patient does report a remote history of kidney stones.  Patient denies palliative factors or provocative factors of eating food.  Patient denies noneffective treatment.  Patient denies fevers, chills, vomiting, constipation and urinary symptoms.  Patient denies sick contacts recent travel.  Patient denies recent fall and recent trauma.  Patient denies chest pain and shortness of breath.    Past Medical History[1]   Past Surgical History[2]   Family History[3]   Social History[4]   E-Cigarette/Vaping    E-Cigarette Use Never User       E-Cigarette/Vaping Substances      I have reviewed and agree with the history as documented.       History provided by:  Patient   used: No    Abdominal Pain  Associated symptoms: diarrhea and nausea    Associated symptoms: no chest pain, no chills, no constipation, no cough, no dysuria, no fever, no hematuria, no shortness of breath, no sore throat and no vomiting        Review of Systems   Constitutional:  Negative for activity change, appetite change, chills and fever.   HENT:  Negative for congestion, ear pain, postnasal drip, rhinorrhea, sinus pressure, sinus pain, sore throat and tinnitus.    Eyes:  Negative for photophobia, pain and visual disturbance.   Respiratory:  Negative for cough, chest tightness and shortness of breath.    Cardiovascular:  Negative for chest pain and palpitations.   Gastrointestinal:  Positive for abdominal pain, diarrhea and nausea. Negative for constipation and vomiting.   Genitourinary:  Negative for difficulty urinating, dysuria, flank pain, frequency, hematuria and urgency.   Musculoskeletal:  Negative for arthralgias, back pain, gait problem, neck pain and neck stiffness.   Skin:  Negative for color change, pallor and rash.   Allergic/Immunologic: Negative for environmental  "allergies and food allergies.   Neurological:  Negative for dizziness, seizures, syncope, weakness, numbness and headaches.   Psychiatric/Behavioral:  Negative for confusion.    All other systems reviewed and are negative.          Objective       ED Triage Vitals   Temperature Pulse Blood Pressure Respirations SpO2 Patient Position - Orthostatic VS   06/17/25 2041 06/17/25 2041 06/17/25 2041 06/17/25 2041 06/17/25 2041 06/17/25 2115   98.9 °F (37.2 °C) (!) 107 (!) 172/83 19 95 % Sitting      Temp src Heart Rate Source BP Location FiO2 (%) Pain Score    -- 06/17/25 2041 06/17/25 2041 -- 06/17/25 2041     Monitor Right arm  1      Vitals      Date and Time Temp Pulse SpO2 Resp BP Pain Score FACES Pain Rating User   06/17/25 2240 -- -- -- -- -- 5 -- AY   06/17/25 2130 -- 100 95 % 18 143/84 -- -- AY   06/17/25 2115 -- 94 93 % 18 170/94 -- -- AY   06/17/25 2111 -- -- -- -- -- 2 -- AY   06/17/25 2041 98.9 °F (37.2 °C) 107 95 % 19 172/83 1 -- AD            Physical Exam  Vitals and nursing note reviewed.   Constitutional:       General: He is awake.      Appearance: Normal appearance. He is well-developed and normal weight. He is not ill-appearing, toxic-appearing or diaphoretic.      Comments: /84 (BP Location: Left arm)   Pulse 100   Temp 98.9 °F (37.2 °C)   Resp 18   Ht 5' 6\" (1.676 m)   Wt (!) 178 kg (391 lb 8.6 oz)   SpO2 95%   BMI 63.20 kg/m²      HENT:      Head: Normocephalic and atraumatic.      Jaw: There is normal jaw occlusion.      Right Ear: Hearing, tympanic membrane and external ear normal. No decreased hearing noted. No drainage, swelling or tenderness. No mastoid tenderness.      Left Ear: Hearing, tympanic membrane and external ear normal. No decreased hearing noted. No drainage, swelling or tenderness. No mastoid tenderness.      Nose: Nose normal.      Mouth/Throat:      Lips: Pink.      Mouth: Mucous membranes are moist.      Pharynx: Oropharynx is clear. Uvula midline.     Eyes:      " General: Lids are normal. Vision grossly intact. Gaze aligned appropriately.         Right eye: No discharge.         Left eye: No discharge.      Extraocular Movements: Extraocular movements intact.      Conjunctiva/sclera: Conjunctivae normal.      Pupils: Pupils are equal, round, and reactive to light.     Neck:      Vascular: No JVD.      Trachea: Trachea and phonation normal. No tracheal tenderness or tracheal deviation.     Cardiovascular:      Rate and Rhythm: Normal rate and regular rhythm.      Pulses: Normal pulses.           Radial pulses are 2+ on the right side and 2+ on the left side.        Posterior tibial pulses are 2+ on the right side and 2+ on the left side.      Heart sounds: Normal heart sounds.   Pulmonary:      Effort: Pulmonary effort is normal.      Breath sounds: Normal breath sounds and air entry. No stridor. No decreased breath sounds, wheezing, rhonchi or rales.   Chest:      Chest wall: No tenderness.   Abdominal:      General: Abdomen is flat. Bowel sounds are normal. There is no distension.      Palpations: Abdomen is soft. Abdomen is not rigid.      Tenderness: There is abdominal tenderness in the right upper quadrant and right lower quadrant. There is no right CVA tenderness, left CVA tenderness, guarding or rebound.     Musculoskeletal:         General: Normal range of motion.      Cervical back: Full passive range of motion without pain, normal range of motion and neck supple. No rigidity. No spinous process tenderness or muscular tenderness. Normal range of motion.   Feet:      Right foot:      Toenail Condition: Right toenails are normal.      Left foot:      Toenail Condition: Left toenails are normal.   Lymphadenopathy:      Head:      Right side of head: No submental, submandibular, tonsillar, preauricular, posterior auricular or occipital adenopathy.      Left side of head: No submental, submandibular, tonsillar, preauricular, posterior auricular or occipital adenopathy.       Cervical: No cervical adenopathy.      Right cervical: No superficial, deep or posterior cervical adenopathy.     Left cervical: No superficial, deep or posterior cervical adenopathy.     Skin:     General: Skin is warm.      Capillary Refill: Capillary refill takes less than 2 seconds.      Findings: No rash.     Neurological:      General: No focal deficit present.      Mental Status: He is alert and oriented to person, place, and time. Mental status is at baseline.      GCS: GCS eye subscore is 4. GCS verbal subscore is 5. GCS motor subscore is 6.      Sensory: No sensory deficit.     Psychiatric:         Attention and Perception: Attention normal.         Mood and Affect: Mood normal.         Speech: Speech normal.         Behavior: Behavior normal. Behavior is cooperative.         Thought Content: Thought content normal.         Judgment: Judgment normal.         Results Reviewed       Procedure Component Value Units Date/Time    HS Troponin I 2hr [[id number]]  (Normal) Collected: 06/17/25 2237    Lab Status: Final result Specimen: Blood from Arm, Left Updated: 06/17/25 2303     hs TnI 2hr 9 ng/L      Delta 2hr hsTnI -1 ng/L     Comprehensive metabolic panel [[id number]]  (Abnormal) Collected: 06/17/25 2053    Lab Status: Final result Specimen: Blood from Arm, Left Updated: 06/17/25 2128     Sodium 139 mmol/L      Potassium 3.1 mmol/L      Chloride 101 mmol/L      CO2 30 mmol/L      ANION GAP 8 mmol/L      BUN 19 mg/dL      Creatinine 1.08 mg/dL      Glucose 88 mg/dL      Calcium 9.1 mg/dL      AST 36 U/L      ALT 46 U/L      Alkaline Phosphatase 66 U/L      Total Protein 7.5 g/dL      Albumin 4.3 g/dL      Total Bilirubin 0.49 mg/dL      eGFR 84 ml/min/1.73sq m     Narrative:      National Kid[employer] Foundation guidelines for Chronic Kidney Disease (CKD):     Stage 1 with normal or high GFR (GFR > 90 mL/min/1.73 square meters)    Stage 2 Mild CKD (GFR = 60-89 mL/min/1.73 square meters)    Stage 3A Moderate  CKD (GFR = 45-59 mL/min/1.73 square meters)    Stage 3B Moderate CKD (GFR = 30-44 mL/min/1.73 square meters)    Stage 4 Severe CKD (GFR = 15-29 mL/min/1.73 square meters)    Stage 5 End Stage CKD (GFR <15 mL/min/1.73 square meters)  Note: GFR calculation is accurate only with a steady state creatinine    Lipase [[id number]]  (Normal) Collected: 06/17/25 2053    Lab Status: Final result Specimen: Blood from Arm, Left Updated: 06/17/25 2128     Lipase 24 u/L     HS Troponin 0hr (reflex protocol) [[id number]]  (Normal) Collected: 06/17/25 2053    Lab Status: Final result Specimen: Blood from Arm, Left Updated: 06/17/25 2128     hs TnI 0hr 10 ng/L     Protime-INR [[id number]]  (Abnormal) Collected: 06/17/25 2053    Lab Status: Final result Specimen: Blood from Arm, Left Updated: 06/17/25 2113     Protime 26.1 seconds      INR 2.35    Narrative:      INR Therapeutic Range    Indication                                             INR Range      Atrial Fibrillation                                               2.0-3.0  Hypercoagulable State                                    2.0.2.3  Left Ventricular Asist Device                            2.0-3.0  Mechanical Heart Valve                                  -    Aortic(with afib, MI, embolism, HF, LA enlargement,    and/or coagulopathy)                                     2.0-3.0 (2.5-3.5)     Mitral                                                             2.5-3.5  Prosthetic/Bioprosthetic Heart Valve               2.0-3.0  Venous thromboembolism (VTE: VT, PE        2.0-3.0    APTT [[id number]]  (Abnormal) Collected: 06/17/25 2053    Lab Status: Final result Specimen: Blood from Arm, Left Updated: 06/17/25 2113     PTT 41 seconds     CBC and differential [[id number]]  (Abnormal) Collected: 06/17/25 2053    Lab Status: Final result Specimen: Blood from Arm, Left Updated: 06/17/25 2059     WBC 12.30 Thousand/uL      RBC 5.51 Million/uL      Hemoglobin 15.5 g/dL      Hematocrit  46.7 %      MCV 85 fL      MCH 28.1 pg      MCHC 33.2 g/dL      RDW 13.5 %      MPV 9.2 fL      Platelets 279 Thousands/uL      nRBC 0 /100 WBCs      Segmented % 70 %      Immature Grans % 1 %      Lymphocytes % 16 %      Monocytes % 12 %      Eosinophils Relative 1 %      Basophils Relative 0 %      Absolute Neutrophils 8.74 Thousands/µL      Absolute Immature Grans 0.07 Thousand/uL      Absolute Lymphocytes 1.95 Thousands/µL      Absolute Monocytes 1.41 Thousand/µL      Eosinophils Absolute 0.09 Thousand/µL      Basophils Absolute 0.04 Thousands/µL     UA w Reflex to Microscopic w Reflex to Culture [[id number]]     Lab Status: No result Specimen: Urine, Clean Catch             CT abdomen pelvis with contrast    (Results Pending)       ECG 12 Lead Documentation Only    Date/Time: 6/17/2025 8:54 PM    Performed by: Huy Augustine PA-C  Authorized by: Huy Augustine PA-C    Indications / Diagnosis:  Upper abdominal pain, right  ECG reviewed by me, the ED Provider: yes    Patient location:  ED  Previous ECG:     Previous ECG:  Compared to current    Comparison ECG info:  When compared to ECG on September 10, 2014, no significant changes were noted.    Similarity:  No change    Comparison to cardiac monitor: Yes    Interpretation:     Interpretation: normal    Rate:     ECG rate:  97    ECG rate assessment: normal    Rhythm:     Rhythm: sinus rhythm    Ectopy:     Ectopy: none    QRS:     QRS axis:  Normal    QRS intervals:  Normal  Conduction:     Conduction: normal    ST segments:     ST segments:  Normal  T waves:     T waves: normal        ED Medication and Procedure Management   Prior to Admission Medications   Prescriptions Last Dose Informant Patient Reported? Taking?   Lidocaine Viscous HCl (XYLOCAINE) 2 % mucosal solution   No No   Sig: Swish and spit 15 mL 4 (four) times a day as needed for mouth pain or discomfort   albuterol (ProAir HFA) 90 mcg/act inhaler   No No   Sig: Inhale 2 puffs every 6 (six) hours as  needed for wheezing   amLODIPine (NORVASC) 10 mg tablet   No No   Sig: TAKE 1 TABLET BY MOUTH EVERY DAY   atorvastatin (LIPITOR) 20 mg tablet   No No   Sig: TAKE 1 TABLET BY MOUTH EVERY DAY   cetirizine (ZyrTEC) 10 mg tablet   Yes No   Sig: Take 10 mg by mouth daily   ipratropium (ATROVENT) 0.03 % nasal spray   No No   Sig: SPRAY 2 SPRAYS INTO EACH NOSTRIL EVERY 12 HOURS.   lisinopril-hydrochlorothiazide (PRINZIDE,ZESTORETIC) 20-25 MG per tablet   No No   Sig: TAKE 1 TABLET BY MOUTH EVERY DAY   spironolactone (ALDACTONE) 25 mg tablet   No No   Sig: TAKE 1 TABLET BY MOUTH EVERY DAY   warfarin (COUMADIN) 5 mg tablet   No No   Sig: Take 1 tablet (5 mg total) by mouth 2 (two) times a day      Facility-Administered Medications: None     Patient's Medications   Discharge Prescriptions    No medications on file     No discharge procedures on file.  ED SEPSIS DOCUMENTATION              [1]   Past Medical History:  Diagnosis Date    Cerebral thrombosis with cerebral infarction (HCC) 10/1/2014    COVID-19 10/13/2021    COVID-19 10/13/2021    Hypertension     Other hyperlipidemia 10/3/2014    Stroke (HCC)    [2]   Past Surgical History:  Procedure Laterality Date    HERNIA REPAIR      INGUINAL HERNIA REPAIR     [3]   Family History  Problem Relation Name Age of Onset    Cancer Mother      Hypertension Mother      Thyroid disease Mother      Cancer Father      Diabetes Father      Hypertension Father      Stroke Father      Prostate cancer Other Uncle     Substance Abuse Neg Hx          neg fam hx    Mental illness Neg Hx          neg fam hx   [4]   Social History  Tobacco Use    Smoking status: Never    Smokeless tobacco: Never   Vaping Use    Vaping status: Never Used   Substance Use Topics    Alcohol use: Yes     Comment: occasional    Drug use: No        Huy Augustine PA-C  06/17/25 3950

## 2025-06-24 ENCOUNTER — OFFICE VISIT (OUTPATIENT)
Dept: FAMILY MEDICINE CLINIC | Facility: HOSPITAL | Age: 40
End: 2025-06-24
Payer: COMMERCIAL

## 2025-06-24 VITALS
BODY MASS INDEX: 50.62 KG/M2 | SYSTOLIC BLOOD PRESSURE: 118 MMHG | HEART RATE: 107 BPM | DIASTOLIC BLOOD PRESSURE: 80 MMHG | OXYGEN SATURATION: 98 % | WEIGHT: 315 LBS | HEIGHT: 66 IN

## 2025-06-24 DIAGNOSIS — E66.813 CLASS 3 SEVERE OBESITY DUE TO EXCESS CALORIES WITH SERIOUS COMORBIDITY AND BODY MASS INDEX (BMI) OF 60.0 TO 69.9 IN ADULT: Primary | ICD-10-CM

## 2025-06-24 DIAGNOSIS — R73.03 PREDIABETES: ICD-10-CM

## 2025-06-24 DIAGNOSIS — E87.6 HYPOKALEMIA: ICD-10-CM

## 2025-06-24 DIAGNOSIS — I10 PRIMARY HYPERTENSION: ICD-10-CM

## 2025-06-24 DIAGNOSIS — E66.01 MORBID OBESITY WITH BMI OF 50.0-59.9, ADULT (HCC): ICD-10-CM

## 2025-06-24 DIAGNOSIS — M54.50 ACUTE RIGHT-SIDED LOW BACK PAIN WITHOUT SCIATICA: ICD-10-CM

## 2025-06-24 DIAGNOSIS — I63.9 CEREBROVASCULAR ACCIDENT (CVA), UNSPECIFIED MECHANISM (HCC): ICD-10-CM

## 2025-06-24 DIAGNOSIS — K42.9 UMBILICAL HERNIA WITHOUT OBSTRUCTION AND WITHOUT GANGRENE: ICD-10-CM

## 2025-06-24 DIAGNOSIS — E27.9 ADRENAL NODULE (HCC): Primary | ICD-10-CM

## 2025-06-24 PROCEDURE — 99214 OFFICE O/P EST MOD 30 MIN: CPT

## 2025-06-24 RX ORDER — POTASSIUM CHLORIDE 750 MG/1
10 CAPSULE, EXTENDED RELEASE ORAL DAILY
Qty: 30 CAPSULE | Refills: 0 | Status: SHIPPED | OUTPATIENT
Start: 2025-06-24

## 2025-06-24 RX ORDER — SEMAGLUTIDE 0.25 MG/.5ML
INJECTION, SOLUTION SUBCUTANEOUS
Qty: 2 ML | Refills: 0 | Status: SHIPPED | OUTPATIENT
Start: 2025-06-24 | End: 2025-06-25

## 2025-06-24 NOTE — ASSESSMENT & PLAN NOTE
Orders:    Semaglutide-Weight Management (Wegovy) 0.25 MG/0.5ML; Inject 0.25 mg under the skin weekly    Basic metabolic panel; Future

## 2025-06-24 NOTE — PATIENT INSTRUCTIONS
- Start Potassium 10 meq daily  - Check potassium at [facility] (non-fasting BMP) in approximately 1 week  - Check INR on Monday (may be low, may need to increase warfarin)  - See endocrinology as already scheduled  - Schedule CT abdomen to image adrenal nodules  - Schedule with surgery to evaluate umbilical hernia

## 2025-06-24 NOTE — ASSESSMENT & PLAN NOTE
{If prescribing weight loss medication, click here to fill out prior auth smartform and then hit F2 with this smartlist to insert prior auth documentation (Optional):[id number]}    Still on lifestyle modifications including diet and exercise.  Denies any personal or family history of medullary thyroid cancer.  Will start GLP-1    Orders:    Semaglutide-Weight Management (Wegovy) 0.25 MG/0.5ML; Inject 0.25 mg under the skin weekly

## 2025-06-24 NOTE — PROGRESS NOTES
Name: Migel Wang      : 1985      MRN: [id number]  Encounter Provider: Deng Henry MD  Encounter Date: 2025   Encounter department: [facility] [address]  :  Assessment & Plan  Adrenal nodule (HCC)  Incidental finding on recent CT.  Establish with endocrinology as scheduled and complete CT renal study   Orders:    CT abdomen pelvis w wo contrast; Future    Umbilical hernia without obstruction and without gangrene  Patient reports foul-smelling discharge from his umbilicus, also reports a history of a hernia as an infant but is not sure what kind.  No hernia noted on recent CT imaging, although imaging may be hindered by obesity.  Could not feel bottom of umbilicus with a long Q-tip.  Will refer to general surgery for further evaluation    Orders:    Ambulatory Referral to General Surgery; Future    Morbid obesity with BMI of 50.0-59.9, adult (HCC)      Still on lifestyle modifications including diet and exercise.  Denies any personal or family history of medullary thyroid cancer.  Will start GLP-1    Orders:    Semaglutide-Weight Management (Wegovy) 0.25 MG/0.5ML; Inject 0.25 mg under the skin weekly    Prediabetes    Orders:    Semaglutide-Weight Management (Wegovy) 0.25 MG/0.5ML; Inject 0.25 mg under the skin weekly    Primary hypertension    Orders:    Semaglutide-Weight Management (Wegovy) 0.25 MG/0.5ML; Inject 0.25 mg under the skin weekly    Basic metabolic panel; Future    Cerebrovascular accident (CVA), unspecified mechanism (HCC)    Orders:    Semaglutide-Weight Management (Wegovy) 0.25 MG/0.5ML; Inject 0.25 mg under the skin weekly    Hypokalemia  Patient instructions:  - Start Potassium 10 meq daily  - Check potassium at [facility] (non-fasting BMP) in approximately 1 week  - Check INR on Monday (may be low, may need to increase warfarin)  - See endocrinology as already scheduled  - Schedule CT abdomen to image adrenal nodules  - Schedule with surgery to  "evaluate umbilical hernia  Orders:    potassium chloride (MICRO-K) 10 MEQ CR capsule; Take 1 capsule (10 mEq total) by mouth daily    Basic metabolic panel; Future    Acute right-sided low back pain without sciatica  No spinal or paraspinal tenderness, some tenderness over the muscle of the lower right back.  Muscle relaxers and PT referral declined              History of Present Illness   HPI  Patient presents to follow-up from the hospital for abdominal pain which is since resolved.  He does report some lower right sided back pain over the past week, he notes that he was doing prolonged yard work prior to symptoms, denies any specific injury.  Denies any radiating pain    Review of Systems   Constitutional:  Negative for chills and fever.   Gastrointestinal:  Negative for abdominal pain, diarrhea, nausea and vomiting.        Foul discharge from umbilicus   Musculoskeletal:  Positive for back pain (lower right).   Neurological:  Negative for weakness and numbness.       Objective   /80   Pulse (!) 107   Ht 5' 6\" (1.676 m)   Wt (!) 174 kg (383 lb 12.8 oz)   SpO2 98%   BMI 61.95 kg/m²      Physical Exam  Constitutional:       General: He is not in acute distress.     Appearance: Normal appearance. He is obese.   Abdominal:      Tenderness: There is no abdominal tenderness.     Musculoskeletal:      Comments: No lumbar spinal or paraspinal tenderness.  Mild tenderness to right lower back     Neurological:      Mental Status: He is alert and oriented to person, place, and time.     Psychiatric:         Mood and Affect: Mood normal.         Behavior: Behavior normal.         "

## 2025-06-25 DIAGNOSIS — I63.9 CEREBROVASCULAR ACCIDENT (CVA), UNSPECIFIED MECHANISM (HCC): ICD-10-CM

## 2025-06-25 DIAGNOSIS — R73.03 PREDIABETES: ICD-10-CM

## 2025-06-25 DIAGNOSIS — E66.813 CLASS 3 SEVERE OBESITY DUE TO EXCESS CALORIES WITH SERIOUS COMORBIDITY AND BODY MASS INDEX (BMI) OF 60.0 TO 69.9 IN ADULT: ICD-10-CM

## 2025-06-25 DIAGNOSIS — I10 PRIMARY HYPERTENSION: ICD-10-CM

## 2025-06-25 RX ORDER — TIRZEPATIDE 2.5 MG/.5ML
2.5 INJECTION, SOLUTION SUBCUTANEOUS WEEKLY
Qty: 2 ML | Refills: 0 | Status: SHIPPED | OUTPATIENT
Start: 2025-06-25 | End: 2025-07-23

## 2025-06-25 RX ORDER — PHENTERMINE HYDROCHLORIDE 15 MG/1
CAPSULE ORAL
Refills: 0 | OUTPATIENT
Start: 2025-06-25

## 2025-06-27 RX ORDER — PHENTERMINE HYDROCHLORIDE 15 MG/1
CAPSULE ORAL
Refills: 0 | OUTPATIENT
Start: 2025-06-27

## 2025-06-30 NOTE — TELEPHONE ENCOUNTER
Spoke to patient.  He is not interested in seeing weight management.  As the medication is not covered he is not interested in paying out of pocket.  At future visit would consider trying to get meds again.  Has endo appt on 8/15.

## 2025-06-30 NOTE — TELEPHONE ENCOUNTER
Migel returning missed call from earlier, relayed message, attempted warm transfer, no one was available. Please reach out to Migel to further discuss.

## 2025-07-03 ENCOUNTER — ANTICOAG VISIT (OUTPATIENT)
Dept: FAMILY MEDICINE CLINIC | Facility: HOSPITAL | Age: 40
End: 2025-07-03

## 2025-07-03 LAB — INR PPP: 3 (ref 0.85–1.19)

## 2025-07-06 LAB
BUN SERPL-MCNC: 15 MG/DL (ref 6–24)
BUN/CREAT SERPL: 17 (ref 9–20)
CALCIUM SERPL-MCNC: 8.9 MG/DL (ref 8.7–10.2)
CHLORIDE SERPL-SCNC: 103 MMOL/L (ref 96–106)
CO2 SERPL-SCNC: 22 MMOL/L (ref 20–29)
CREAT SERPL-MCNC: 0.9 MG/DL (ref 0.76–1.27)
EGFR: 110 ML/MIN/1.73
GLUCOSE SERPL-MCNC: 109 MG/DL (ref 70–99)
POTASSIUM SERPL-SCNC: 3.7 MMOL/L (ref 3.5–5.2)
SODIUM SERPL-SCNC: 144 MMOL/L (ref 134–144)

## 2025-07-13 DIAGNOSIS — E78.49 OTHER HYPERLIPIDEMIA: ICD-10-CM

## 2025-07-15 ENCOUNTER — TELEPHONE (OUTPATIENT)
Age: 40
End: 2025-07-15

## 2025-07-15 ENCOUNTER — ANTICOAG VISIT (OUTPATIENT)
Dept: FAMILY MEDICINE CLINIC | Facility: HOSPITAL | Age: 40
End: 2025-07-15

## 2025-07-15 LAB — INR PPP: 3.5 (ref 0.85–1.19)

## 2025-07-15 RX ORDER — ATORVASTATIN CALCIUM 20 MG/1
20 TABLET, FILM COATED ORAL DAILY
Qty: 90 TABLET | Refills: 1 | Status: SHIPPED | OUTPATIENT
Start: 2025-07-15

## 2025-07-17 DIAGNOSIS — I63.9 CEREBROVASCULAR ACCIDENT (CVA), UNSPECIFIED MECHANISM (HCC): ICD-10-CM

## 2025-07-18 RX ORDER — WARFARIN SODIUM 5 MG/1
5 TABLET ORAL 2 TIMES DAILY
Qty: 60 TABLET | Refills: 5 | Status: SHIPPED | OUTPATIENT
Start: 2025-07-18

## 2025-07-29 ENCOUNTER — TELEPHONE (OUTPATIENT)
Age: 40
End: 2025-07-29

## 2025-07-30 ENCOUNTER — TELEPHONE (OUTPATIENT)
Dept: FAMILY MEDICINE CLINIC | Facility: HOSPITAL | Age: 40
End: 2025-07-30

## 2025-07-30 ENCOUNTER — ANTICOAG VISIT (OUTPATIENT)
Dept: FAMILY MEDICINE CLINIC | Facility: HOSPITAL | Age: 40
End: 2025-07-30

## 2025-07-30 LAB — INR PPP: 2.5 (ref 0.85–1.19)

## 2025-08-15 ENCOUNTER — CONSULT (OUTPATIENT)
Dept: ENDOCRINOLOGY | Facility: HOSPITAL | Age: 40
End: 2025-08-15
Attending: INTERNAL MEDICINE
Payer: COMMERCIAL

## 2025-08-19 ENCOUNTER — ANTICOAG VISIT (OUTPATIENT)
Dept: FAMILY MEDICINE CLINIC | Facility: HOSPITAL | Age: 40
End: 2025-08-19

## 2025-08-19 LAB — INR PPP: 3.8 (ref 0.85–1.19)

## 2025-08-22 DIAGNOSIS — I10 ESSENTIAL HYPERTENSION: ICD-10-CM

## 2025-08-23 RX ORDER — LISINOPRIL AND HYDROCHLOROTHIAZIDE 20; 25 MG/1; MG/1
1 TABLET ORAL DAILY
Qty: 30 TABLET | Refills: 5 | Status: SHIPPED | OUTPATIENT
Start: 2025-08-23